# Patient Record
Sex: FEMALE | Race: WHITE | NOT HISPANIC OR LATINO | Employment: PART TIME | URBAN - NONMETROPOLITAN AREA
[De-identification: names, ages, dates, MRNs, and addresses within clinical notes are randomized per-mention and may not be internally consistent; named-entity substitution may affect disease eponyms.]

---

## 2017-02-07 ENCOUNTER — APPOINTMENT (OUTPATIENT)
Dept: SPEECH THERAPY | Facility: HOSPITAL | Age: 37
End: 2017-02-07

## 2017-02-08 ENCOUNTER — HOSPITAL ENCOUNTER (OUTPATIENT)
Dept: SPEECH THERAPY | Facility: HOSPITAL | Age: 37
Setting detail: THERAPIES SERIES
Discharge: HOME | End: 2017-02-08

## 2017-02-08 DIAGNOSIS — R49.9 VOICE DISTURBANCE: ICD-10-CM

## 2017-02-08 DIAGNOSIS — R49.0 HOARSENESS OF VOICE: Primary | ICD-10-CM

## 2017-02-08 PROCEDURE — 92524 BEHAVRAL QUALIT ANALYS VOICE: CPT

## 2017-02-08 NOTE — PROGRESS NOTES
Outpatient Speech Language Pathology   Adult/Peds Voice Initial Evaluation  [facility]     Patient Name: Yahir Garsia  : 1980  MRN: [id number]  Today's Date: 2017         Visit Date: 2017   Patient Active Problem List   Diagnosis   • Leiomyoma        Past Medical History   Diagnosis Date   • Anxiety    • Chronic laryngitis    • Endometriosis    • Fibroids      Uterine   • Hypertension         Past Surgical History   Procedure Laterality Date   •  section primary     •  section with tubal     • Endometrial fulguration     • Diagnostic laparoscopy     • Endoscopy     • Dilatation and curettage N/A 10/4/2016     Procedure: DILATATION AND CURETTAGE WITH FROZEN SECTION;  Surgeon: Rajendra Rico MD;  Location: [facility];  Service:    • Total laparoscopic hysterectomy N/A 10/4/2016     Procedure: TOTAL LAPAROSCOPIC HYSTERECTOMY BILATERAL SALPINGO OOPHERECTOMY WITH DAVINCI SI ROBOT;  Surgeon: Rajendra Rico MD;  Location: [facility];  Service:          Visit Dx:    ICD-10-CM ICD-9-CM   1. Hoarseness of voice R49.0 784.42   2. Voice disturbance R49.9 784.40                 Voice - 17 0900     General Voice History    Reflux History Patient takes reflux medications  -    Alcohol Servings Pt reports rarely consuming alcohol  -    Daily Water Intake 5-6 bottles per day  -    Daily Caffeine Intake none reported  -    Tobacco History smokes 1 pack per 3 days  -    Environmental Factors None reported  -    Typical Voice Use Uses voice for ADL's;Vocation depends on voice use (comment)  -    Vocal Abuse/Misuse Smoking;Unmanaged reflux  -    Voice Assessment    S/Z Ratio and Interpretation 1.4 which is suggest of vocal fold pathology  -    Maximum Phonation Time and Interpretation 10.9 seconds  -    Voice Features Observed Hoarseness;Breathiness;Unstable Loudness;Fast Fatigability  -    Trach History    Reason for Trach no trach hx  -    Intubation History pt  intubated during sx  -WH    PVFM History    Medical History Related to Referral GERD  -WH    Other Reported Symptoms Chronic coughing  -WH    Measures and Scales for Voice    Measures and Scales for Voice Voice Handicap Index;CAPE-V  -WH    Voice Handicap Index    Functional Subtest Score 31  -WH    Physical Subtest Score 29  -WH    Emotional Subtest Score 30  -WH    Total Score 59  -WH    CAPE-V    Overall Severity Moderately deviant  -WH    Roughness Moderately deviant  -WH    Breathiness Moderately deviant  -WH    Strain Moderately deviant  -WH    Pitch Moderately deviant  -WH    Loudness Moderately deviant  -WH    Instability Moderately deviant  -WH    Resonance Moderately deviant  -WH    Findings/Education/Recommendations    Clinical Impression- Voice Moderate voice disorder  -WH    Activity Limits and Participation Restrictions Employment  -    Impact on Function- Voice Difficulty communicating;Difficulty communicating in an emergency;Restrictions in personal and social life;Unable to complete specified job requirements;Difficulty participating in avocational activities  -    Education Described results of evaluation;Patient/Family expressed understanding or results;Patient/Family participated in establishing goals and plan of treatment;Patient/Family demonstrated recommended strategies;Patient/family requires further education on strategies, risks  -    Barriers to Learning No barriers identified  -    Learning Motivation Strong  -    Learning Assessment Learning needs;Learning motivation;Learning preference;Learning readiness  -    Prognosis Good (comment)  -    Recommendations Treatment for voice disorder;Home program established for patient to follow;Voice therapy  -WH    Frequency 1-2wk  -WH    Duration 12wks  -    Length of Session 60 minutes  -    Screening indicates further evaluation needed in the area(s) of: Voice  -WH      User Key  (r) = Recorded By, (t) = Taken By, (c) = Cosigned  By    Initials Name Provider Type    RONALD Tipton Speech Therapist                              OP SLP Education       02/08/17 1000          Education    Barriers to Learning No barriers identified  -      Education Provided Patient expressed understanding of evaluation;Patient participated in establishing goals and treatment plan;Patient demonstrated recommended strategies;Patient requires further education on strategies, risks  -      Assessed Learning needs;Learning motivation;Learning preferences;Learning readiness  -      Learning Motivation Strong  -      Learning Method Explanation;Demonstration;Written materials  -      Teaching Response Verbalized understanding;Demonstrated understanding  -      Education Comments Educated regarding results and POC  -        User Key  (r) = Recorded By, (t) = Taken By, (c) = Cosigned By    Initials Name Effective Dates     Ju Tipton 05/25/16 -                 SLP OP Goals       02/08/17 1000          Goal Type Needed    Goal Type Needed Voice  -      Voice Goals    Voice LTG's Patient will maintain a program of good vocal hygiene in all settings by developing an awareness of behaviors and lifestyle  -      Patient will maintain a program of good vocal hygiene in all settings by developing an awareness of behaviors and lifestyle. 90%:;without cues  -WH      Status: Patient will maintain a program of good vocal hygiene in all settings by developing an awareness of behaviors and lifestyle. New  -WH      Patient will be able to use voice in vocational and avocational activities without functional limitations due to hoarseness. 90%:;without cues  -WH      Status: Patient will be able to use voice in vocational and avocational activities without functional limitations due to hoarseness. New  -WH      Voice STG's Patient will eliminate vocally abusive behaviors such as coughing or throat clearing  -      Patient will eliminate vocally abusive  "behaviors such as coughing or throat clearing with cues  -WH      Status: Patient will eliminate vocally abusive behaviors such as coughing or throat clearing New  -      Patient will institute the vocal hygiene technique of taking short vocal \"naps\" during the day when voice feels tired 90%:;without cues  -WH      Status: Patient will institute the vocal hygiene technique of taking short vocal \"naps\" during the day when voice feels tired New  -      SLP Time Calculation    SLP Goal Re-Cert Due Date 03/08/17  -        User Key  (r) = Recorded By, (t) = Taken By, (c) = Cosigned By    Initials Name Provider Type     Ju Tipton Speech Therapist                OP SLP Assessment/Plan - 02/08/17 1000     SLP Assessment    Functional Problems --  -      User Key  (r) = Recorded By, (t) = Taken By, (c) = Cosigned By    Initials Name Provider Type     Ju Tipton Speech Therapist                 Time Calculation:   SLP Start Time: 0845  SLP Stop Time: 0930  SLP Time Calculation (min): 45 min  SLP Non-Billable Time (min): 45 min    Therapy Charges for Today     Code Description Service Date Service Provider Modifiers Qty    [id number]  ST CARE PLAN 15 MIN 2/8/2017 Ju IBARRA 3    [id number] [facility] BEHAV QUALT VOICE AND RESONC 3 2/8/2017 Ju IBARRA 1                     Ju Tipton  2/8/2017     "

## 2017-02-13 ENCOUNTER — HOSPITAL ENCOUNTER (OUTPATIENT)
Dept: SPEECH THERAPY | Facility: HOSPITAL | Age: 37
Setting detail: THERAPIES SERIES
Discharge: HOME | End: 2017-02-13

## 2017-02-13 DIAGNOSIS — R49.9 VOICE DISTURBANCE: Primary | ICD-10-CM

## 2017-02-13 PROCEDURE — 92507 TX SP LANG VOICE COMM INDIV: CPT

## 2017-02-13 NOTE — PROGRESS NOTES
Outpatient Speech Language Pathology   Adult/Peds Voice Treatment Note   [facility]     Patient Name: Yahir Garsia  : 1980  MRN: [id number]  Today's Date: 2017           Visit Date: 2017      Patient Active Problem List   Diagnosis   • Leiomyoma       Visit Dx:    ICD-10-CM ICD-9-CM   1. Voice disturbance R49.9 784.40             Voice - 17 0900     General Voice History    Reflux History Patient takes reflux medications  -    Alcohol Servings Pt reports rarely consuming alcohol  -    Daily Water Intake 5-6 bottles per day  -    Daily Caffeine Intake none reported  -    Tobacco History smokes 1 pack per 3 days  -    Environmental Factors None reported  -    Typical Voice Use Uses voice for ADL's;Vocation depends on voice use (comment)  -    Vocal Abuse/Misuse Smoking;Unmanaged reflux  -    Voice Assessment    S/Z Ratio and Interpretation 1.4 which is suggest of vocal fold pathology  -    Maximum Phonation Time and Interpretation 10.9 seconds  -    Voice Features Observed Hoarseness;Breathiness;Unstable Loudness;Fast Fatigability  -    Trach History    Reason for Trach no trach hx  -WH    Intubation History pt intubated during sx  -WH    PVFM History    Medical History Related to Referral GERD  -WH    Other Reported Symptoms Chronic coughing  -    Measures and Scales for Voice    Measures and Scales for Voice Voice Handicap Index;CAPE-V  -WH    Voice Handicap Index    Functional Subtest Score 31  -WH    Physical Subtest Score 29  -WH    Emotional Subtest Score 30  -WH    Total Score 59  -WH    CAPE-V    Overall Severity Moderately deviant  -WH    Roughness Moderately deviant  -WH    Breathiness Moderately deviant  -WH    Strain Moderately deviant  -WH    Pitch Moderately deviant  -WH    Loudness Moderately deviant  -WH    Instability Moderately deviant  -WH    Resonance Moderately deviant  -WH    Findings/Education/Recommendations    Clinical Impression- Voice  Moderate voice disorder  -WH    Activity Limits and Participation Restrictions Employment  -    Impact on Function- Voice Difficulty communicating;Difficulty communicating in an emergency;Restrictions in personal and social life;Unable to complete specified job requirements;Difficulty participating in avocational activities  -    Education Described results of evaluation;Patient/Family expressed understanding or results;Patient/Family participated in establishing goals and plan of treatment;Patient/Family demonstrated recommended strategies;Patient/family requires further education on strategies, risks  -    Barriers to Learning No barriers identified  -    Learning Motivation Strong  -    Learning Assessment Learning needs;Learning motivation;Learning preference;Learning readiness  -    Prognosis Good (comment)  -    Recommendations Treatment for voice disorder;Home program established for patient to follow;Voice therapy  -WH    Frequency 1-2wk  -WH    Duration 12wks  -    Length of Session 60 minutes  -    Screening indicates further evaluation needed in the area(s) of: Voice  -WH      User Key  (r) = Recorded By, (t) = Taken By, (c) = Cosigned By    Initials Name Provider Type    RONALD Tipton Speech Therapist                              OP SLP Assessment/Plan - 02/08/17 1000     SLP Assessment    Functional Problems --  -WH      User Key  (r) = Recorded By, (t) = Taken By, (c) = Cosigned By    Initials Name Provider Type    RONALD Tipton Speech Therapist                SLP OP Goals       02/13/17 1500 02/08/17 1000       Goal Type Needed    Goal Type Needed  Voice  -WH     Voice Goals    Voice LTG's  Patient will maintain a program of good vocal hygiene in all settings by developing an awareness of behaviors and lifestyle  -WH     Patient will maintain a program of good vocal hygiene in all settings by developing an awareness of behaviors and lifestyle. 90%:;without cues  -WH  "90%:;without cues  -WH     Status: Patient will maintain a program of good vocal hygiene in all settings by developing an awareness of behaviors and lifestyle. Progressing as expected  -WH New  -WH     Patient will be able to use voice in vocational and avocational activities without functional limitations due to hoarseness. 90%:;without cues  -WH 90%:;without cues  -WH     Status: Patient will be able to use voice in vocational and avocational activities without functional limitations due to hoarseness. Progressing as expected  -WH New  -WH     Voice STG's  Patient will eliminate vocally abusive behaviors such as coughing or throat clearing  -WH     Patient will eliminate vocally abusive behaviors such as coughing or throat clearing with cues  -WH with cues  -WH     Status: Patient will eliminate vocally abusive behaviors such as coughing or throat clearing Progressing as expected  -WH New  -WH     Patient will institute the vocal hygiene technique of taking short vocal \"naps\" during the day when voice feels tired 90%:;without cues  -WH 90%:;without cues  -WH     Status: Patient will institute the vocal hygiene technique of taking short vocal \"naps\" during the day when voice feels tired Progressing as expected  -WH New  -WH     SLP Time Calculation    SLP Goal Re-Cert Due Date  03/08/17  -       User Key  (r) = Recorded By, (t) = Taken By, (c) = Cosigned By    Initials Name Provider Type    RONALD Tipton Speech Therapist                OP SLP Education       02/13/17 1600          Education    Education Comments Educated pt regarding home program to complete  -        User Key  (r) = Recorded By, (t) = Taken By, (c) = Cosigned By    Initials Name Effective Dates    RONALD Tipton 05/25/16 -                 Time Calculation:   SLP Start Time: 1500  SLP Stop Time: 1600  SLP Time Calculation (min): 60 min  SLP Non-Billable Time (min): 30 min    Therapy Charges for Today     Code Description Service Date " Service Provider Modifiers Qty    [id number] HC ST CARE PLAN 15 MIN 2/13/2017 Ju IBARRA 2    [id number]  ST TREATMENT SPEECH 4 2/13/2017 Ju IBARRA 1                     Ju Tipton  2/13/2017

## 2017-02-14 ENCOUNTER — TRANSCRIBE ORDERS (OUTPATIENT)
Dept: PHYSICAL THERAPY | Facility: HOSPITAL | Age: 37
End: 2017-02-14

## 2017-02-14 DIAGNOSIS — R49.8 NEUROLOGIC VOICE DISORDER: Primary | ICD-10-CM

## 2017-02-20 ENCOUNTER — HOSPITAL ENCOUNTER (OUTPATIENT)
Dept: SPEECH THERAPY | Facility: HOSPITAL | Age: 37
Setting detail: THERAPIES SERIES
Discharge: HOME | End: 2017-02-20

## 2017-02-20 DIAGNOSIS — R49.9 VOICE DISTURBANCE: Primary | ICD-10-CM

## 2017-02-20 DIAGNOSIS — R49.0 HOARSENESS OF VOICE: ICD-10-CM

## 2017-02-20 PROCEDURE — 92507 TX SP LANG VOICE COMM INDIV: CPT

## 2017-02-20 NOTE — PROGRESS NOTES
Outpatient Speech Language Pathology   Adult/Peds Voice Treatment Note   [facility]     Patient Name: Yahir Garsia  : 1980  MRN: [id number]  Today's Date: 2017           Visit Date: 2017      Patient Active Problem List   Diagnosis   • Leiomyoma       Visit Dx:    ICD-10-CM ICD-9-CM   1. Voice disturbance R49.9 784.40   2. Hoarseness of voice R49.0 784.42                                   SLP OP Goals       17 1100 17 1500 17 1000    Goal Type Needed    Goal Type Needed   Voice  -WH    Voice Goals    Voice LTG's   Patient will maintain a program of good vocal hygiene in all settings by developing an awareness of behaviors and lifestyle  -WH    Patient will maintain a program of good vocal hygiene in all settings by developing an awareness of behaviors and lifestyle. 90%:;without cues  -WH 90%:;without cues  -WH 90%:;without cues  -WH    Status: Patient will maintain a program of good vocal hygiene in all settings by developing an awareness of behaviors and lifestyle. Progressing as expected  -WH Progressing as expected  -WH New  -WH    Comments: Patient will maintain a program of good vocal hygiene in all settings by developing an awareness of behaviors and lifestyle. Pt maintains hygiene program w/ 70% acc and min cues  -WH      Patient will be able to use voice in vocational and avocational activities without functional limitations due to hoarseness. 90%:;without cues  -WH 90%:;without cues  -WH 90%:;without cues  -WH    Status: Patient will be able to use voice in vocational and avocational activities without functional limitations due to hoarseness. Progressing as expected  -WH Progressing as expected  -WH New  -WH    Voice STG's   Patient will eliminate vocally abusive behaviors such as coughing or throat clearing  -WH    Patient will eliminate vocally abusive behaviors such as coughing or throat clearing with cues  -WH with cues  -WH with cues  -WH    Status: Patient will  "eliminate vocally abusive behaviors such as coughing or throat clearing Progressing as expected  -WH Progressing as expected  -WH New  -WH    Patient will institute the vocal hygiene technique of taking short vocal \"naps\" during the day when voice feels tired 90%:;without cues  -WH 90%:;without cues  -WH 90%:;without cues  -WH    Status: Patient will institute the vocal hygiene technique of taking short vocal \"naps\" during the day when voice feels tired Progressing as expected  -WH Progressing as expected  -WH New  -WH    SLP Time Calculation    SLP Goal Re-Cert Due Date   03/08/17  -      User Key  (r) = Recorded By, (t) = Taken By, (c) = Cosigned By    Initials Name Provider Type     Ju Tipton Speech Therapist                OP SLP Education       02/20/17 1300          Education    Education Comments Educated regarding progress and home program to utilize  -WH        User Key  (r) = Recorded By, (t) = Taken By, (c) = Cosigned By    Initials Name Effective Dates     Ju Tipton 05/25/16 -                 Time Calculation:   SLP Start Time: 1100  SLP Stop Time: 1200  SLP Time Calculation (min): 60 min  SLP Non-Billable Time (min): 30 min    Therapy Charges for Today     Code Description Service Date Service Provider Modifiers Qty    [id number]  ST CARE PLAN 15 MIN 2/20/2017 Ju IBARRA 2    [id number]  ST TREATMENT SPEECH 4 2/20/2017 Ju Tipton GN 1                     Ju Tipton  2/20/2017     "

## 2017-03-01 ENCOUNTER — HOSPITAL ENCOUNTER (OUTPATIENT)
Dept: SPEECH THERAPY | Facility: HOSPITAL | Age: 37
Setting detail: THERAPIES SERIES
Discharge: HOME | End: 2017-03-01

## 2017-03-01 DIAGNOSIS — R49.9 VOICE DISTURBANCE: Primary | ICD-10-CM

## 2017-03-01 DIAGNOSIS — R49.0 HOARSENESS OF VOICE: ICD-10-CM

## 2017-03-01 PROCEDURE — 92507 TX SP LANG VOICE COMM INDIV: CPT

## 2017-03-01 NOTE — PROGRESS NOTES
"Outpatient Speech Language Pathology   Adult/Peds Voice Treatment Note   [facility]     Patient Name: Yahir Garsia  : 1980  MRN: [id number]  Today's Date: 3/1/2017           Visit Date: 2017      Patient Active Problem List   Diagnosis   • Leiomyoma       Visit Dx:    ICD-10-CM ICD-9-CM   1. Voice disturbance R49.9 784.40   2. Hoarseness of voice R49.0 784.42                                   SLP OP Goals       17 1300       Voice Goals    Patient will maintain a program of good vocal hygiene in all settings by developing an awareness of behaviors and lifestyle. 90%:;without cues  -WH     Status: Patient will maintain a program of good vocal hygiene in all settings by developing an awareness of behaviors and lifestyle. Progressing as expected  -WH     Comments: Patient will maintain a program of good vocal hygiene in all settings by developing an awareness of behaviors and lifestyle. Pt maintains hygiene program w/ 70% acc and min cues  -WH     Patient will be able to use voice in vocational and avocational activities without functional limitations due to hoarseness. 90%:;without cues  -WH     Status: Patient will be able to use voice in vocational and avocational activities without functional limitations due to hoarseness. Progressing as expected  -WH     Patient will eliminate vocally abusive behaviors such as coughing or throat clearing with cues  -WH     Status: Patient will eliminate vocally abusive behaviors such as coughing or throat clearing Progressing as expected  -WH     Patient will institute the vocal hygiene technique of taking short vocal \"naps\" during the day when voice feels tired 90%:;without cues  -WH     Status: Patient will institute the vocal hygiene technique of taking short vocal \"naps\" during the day when voice feels tired Progressing as expected  -WH       User Key  (r) = Recorded By, (t) = Taken By, (c) = Cosigned By    Initials Name Provider Type    RONALD Tipton" Speech Therapist                OP SLP Education       03/01/17 1300    Education    Education Comments Educated pt regarding progress and continuing vocal rest and hydration program  -      User Key  (r) = Recorded By, (t) = Taken By, (c) = Cosigned By    Initials Name Effective Dates     Ju Tipton 05/25/16 -                 Time Calculation:   SLP Start Time: 1300  SLP Stop Time: 1330  SLP Time Calculation (min): 30 min  SLP Non-Billable Time (min): 30 min    Therapy Charges for Today     Code Description Service Date Service Provider Modifiers Qty    [id number]  ST CARE PLAN 15 MIN 3/1/2017 Ju Tipton  2    [id number] [facility] TREATMENT SPEECH 2 3/1/2017 Ju Tipton  1                     Ju Tipton  3/1/2017

## 2017-03-03 ENCOUNTER — HOSPITAL ENCOUNTER (OUTPATIENT)
Dept: GENERAL RADIOLOGY | Facility: HOSPITAL | Age: 37
Discharge: HOME | End: 2017-03-03
Attending: PSYCHIATRY & NEUROLOGY | Admitting: PSYCHIATRY & NEUROLOGY

## 2017-03-03 ENCOUNTER — TRANSCRIBE ORDERS (OUTPATIENT)
Dept: GENERAL RADIOLOGY | Facility: HOSPITAL | Age: 37
End: 2017-03-03

## 2017-03-03 DIAGNOSIS — M54.2 NECK PAIN: ICD-10-CM

## 2017-03-03 DIAGNOSIS — M54.2 NECK PAIN: Primary | ICD-10-CM

## 2017-03-03 PROCEDURE — 72050 X-RAY EXAM NECK SPINE 4/5VWS: CPT | Performed by: RADIOLOGY

## 2017-03-03 PROCEDURE — 72050 X-RAY EXAM NECK SPINE 4/5VWS: CPT

## 2017-03-06 ENCOUNTER — HOSPITAL ENCOUNTER (OUTPATIENT)
Dept: SPEECH THERAPY | Facility: HOSPITAL | Age: 37
Setting detail: THERAPIES SERIES
Discharge: HOME | End: 2017-03-06

## 2017-03-06 DIAGNOSIS — R49.9 VOICE DISTURBANCE: Primary | ICD-10-CM

## 2017-03-06 PROCEDURE — 92507 TX SP LANG VOICE COMM INDIV: CPT

## 2017-03-13 ENCOUNTER — HOSPITAL ENCOUNTER (OUTPATIENT)
Dept: SPEECH THERAPY | Facility: HOSPITAL | Age: 37
Setting detail: THERAPIES SERIES
Discharge: HOME | End: 2017-03-13

## 2017-03-13 DIAGNOSIS — R49.9 VOICE DISTURBANCE: Primary | ICD-10-CM

## 2017-03-13 PROCEDURE — 92507 TX SP LANG VOICE COMM INDIV: CPT

## 2017-03-13 NOTE — PROGRESS NOTES
"Outpatient Speech Language Pathology   Adult/Peds Voice Treatment Note   [place]     Patient Name: Yahir Garsia  : 1980  MRN: [id number]  Today's Date: 3/13/2017           Visit Date: 2017      Patient Active Problem List   Diagnosis   • Leiomyoma       Visit Dx:    ICD-10-CM ICD-9-CM   1. Voice disturbance R49.9 784.40                                   SLP OP Goals       17 1100       Voice Goals    Patient will maintain a program of good vocal hygiene in all settings by developing an awareness of behaviors and lifestyle. 90%:;without cues  -WH     Status: Patient will maintain a program of good vocal hygiene in all settings by developing an awareness of behaviors and lifestyle. Progressing as expected  -WH     Comments: Patient will maintain a program of good vocal hygiene in all settings by developing an awareness of behaviors and lifestyle. Pt maintains hygiene program w/ 70% acc and min cues  -WH     Patient will be able to use voice in vocational and avocational activities without functional limitations due to hoarseness. 90%:;without cues  -WH     Status: Patient will be able to use voice in vocational and avocational activities without functional limitations due to hoarseness. Progressing as expected  -WH     Comments: Patient will be able to use voice in vocational and avocational activities without functional limitations due to hoarseness. Pt w/ decreased ability to perform vocalization tasks today due to hoarsness  -WH     Patient will eliminate vocally abusive behaviors such as coughing or throat clearing with cues  -WH     Status: Patient will eliminate vocally abusive behaviors such as coughing or throat clearing Progressing as expected  -WH     Patient will institute the vocal hygiene technique of taking short vocal \"naps\" during the day when voice feels tired 90%:;without cues  -WH     Status: Patient will institute the vocal hygiene technique of taking short vocal \"naps\" " during the day when voice feels tired Progressing as expected  -       User Key  (r) = Recorded By, (t) = Taken By, (c) = Cosigned By    Initials Name Provider Type    RONALD Tipton Speech Therapist                OP SLP Education       03/13/17 1200    Education    Education Comments Educated pt regarding continuing vocal hygiene program and vocal rest  -      User Key  (r) = Recorded By, (t) = Taken By, (c) = Cosigned By    Initials Name Effective Dates    RONALD Tipton 05/25/16 -                 Time Calculation:   SLP Start Time: 1100  SLP Stop Time: 1200  SLP Time Calculation (min): 60 min  SLP Non-Billable Time (min): 30 min    Therapy Charges for Today     Code Description Service Date Service Provider Modifiers Qty    [id number]  ST CARE PLAN 15 MIN 3/13/2017 Ju IBARRA 2    [id number]  ST TREATMENT SPEECH 4 3/13/2017 Ju IBARRA 1                     uJ Tipton  3/13/2017

## 2017-03-20 ENCOUNTER — HOSPITAL ENCOUNTER (OUTPATIENT)
Dept: SPEECH THERAPY | Facility: HOSPITAL | Age: 37
Setting detail: THERAPIES SERIES
Discharge: HOME | End: 2017-03-20

## 2017-03-20 DIAGNOSIS — R49.9 VOICE DISTURBANCE: Primary | ICD-10-CM

## 2017-03-20 DIAGNOSIS — R49.0 HOARSENESS OF VOICE: ICD-10-CM

## 2017-03-20 PROCEDURE — 92507 TX SP LANG VOICE COMM INDIV: CPT

## 2017-03-20 NOTE — PROGRESS NOTES
"Outpatient Speech Language Pathology   Adult/Peds Voice Treatment Note   [place]     Patient Name: Yahir Garsia  : 1980  MRN: [id number]  Today's Date: 3/20/2017           Visit Date: 2017      Patient Active Problem List   Diagnosis   • Leiomyoma       Visit Dx:    ICD-10-CM ICD-9-CM   1. Voice disturbance R49.9 784.40   2. Hoarseness of voice R49.0 784.42                                   SLP OP Goals       17 1100       Voice Goals    Patient will maintain a program of good vocal hygiene in all settings by developing an awareness of behaviors and lifestyle. 90%:;without cues  -WH     Status: Patient will maintain a program of good vocal hygiene in all settings by developing an awareness of behaviors and lifestyle. Progressing as expected  -WH     Comments: Patient will maintain a program of good vocal hygiene in all settings by developing an awareness of behaviors and lifestyle. Pt maintains hygiene program w/ 80% acc and min cues  -WH     Patient will be able to use voice in vocational and avocational activities without functional limitations due to hoarseness. 90%:;without cues  -WH     Status: Patient will be able to use voice in vocational and avocational activities without functional limitations due to hoarseness. Progressing as expected  -WH     Comments: Patient will be able to use voice in vocational and avocational activities without functional limitations due to hoarseness. Pt w/ decreased ability to perform vocalization tasks today due to hoarsness  -WH     Patient will eliminate vocally abusive behaviors such as coughing or throat clearing with cues  -WH     Status: Patient will eliminate vocally abusive behaviors such as coughing or throat clearing Progressing as expected  -WH     Patient will institute the vocal hygiene technique of taking short vocal \"naps\" during the day when voice feels tired 90%:;without cues  -WH     Status: Patient will institute the vocal hygiene " "technique of taking short vocal \"naps\" during the day when voice feels tired Progressing as expected  -       User Key  (r) = Recorded By, (t) = Taken By, (c) = Cosigned By    Initials Name Provider Type    RONALD Tipton Speech Therapist                OP SLP Education       03/20/17 1300    Education    Education Comments Educated pt regarding continuing vocal hygiene and rest  -      User Key  (r) = Recorded By, (t) = Taken By, (c) = Cosigned By    Initials Name Effective Dates    RONALD Tipton 05/25/16 -                 Time Calculation:   SLP Start Time: 1100  SLP Stop Time: 1200  SLP Time Calculation (min): 60 min  SLP Non-Billable Time (min): 30 min    Therapy Charges for Today     Code Description Service Date Service Provider Modifiers Qty    [id number]  ST CARE PLAN 15 MIN 3/20/2017 Ju IBARRA 2    [id number] [facility] TREATMENT SPEECH 4 3/20/2017 Ju IBARRA 1                     Ju Tipton  3/20/2017     "

## 2017-04-10 ENCOUNTER — TRANSCRIBE ORDERS (OUTPATIENT)
Dept: ADMINISTRATIVE | Facility: HOSPITAL | Age: 37
End: 2017-04-10

## 2017-04-10 DIAGNOSIS — J37.0 CHRONIC LARYNGITIS: Primary | ICD-10-CM

## 2017-04-11 ENCOUNTER — HOSPITAL ENCOUNTER (OUTPATIENT)
Dept: MRI IMAGING | Facility: HOSPITAL | Age: 37
Discharge: HOME | End: 2017-04-11
Attending: PSYCHIATRY & NEUROLOGY

## 2017-04-11 DIAGNOSIS — J37.0 CHRONIC LARYNGITIS: ICD-10-CM

## 2017-04-25 ENCOUNTER — OFFICE VISIT (OUTPATIENT)
Dept: SURGERY | Facility: CLINIC | Age: 37
End: 2017-04-25

## 2017-04-25 VITALS
RESPIRATION RATE: 17 BRPM | DIASTOLIC BLOOD PRESSURE: 97 MMHG | BODY MASS INDEX: 44.93 KG/M2 | SYSTOLIC BLOOD PRESSURE: 161 MMHG | HEART RATE: 111 BPM | WEIGHT: 238 LBS | TEMPERATURE: 98.4 F | HEIGHT: 61 IN

## 2017-04-25 DIAGNOSIS — K21.9 GASTROESOPHAGEAL REFLUX DISEASE, ESOPHAGITIS PRESENCE NOT SPECIFIED: Primary | ICD-10-CM

## 2017-04-25 DIAGNOSIS — R49.0 HOARSENESS, CHRONIC: ICD-10-CM

## 2017-04-25 PROBLEM — Z71.9 ENCOUNTER FOR CONSULTATION: Status: ACTIVE | Noted: 2017-04-25

## 2017-04-25 PROCEDURE — 99204 OFFICE O/P NEW MOD 45 MIN: CPT | Performed by: SURGERY

## 2017-04-25 RX ORDER — RANITIDINE 300 MG/1
CAPSULE ORAL
COMMUNITY
Start: 2017-04-17 | End: 2018-01-25

## 2017-04-25 RX ORDER — ESTRADIOL 2 MG/1
TABLET ORAL
COMMUNITY
Start: 2017-04-17 | End: 2018-01-25

## 2017-04-25 NOTE — PROGRESS NOTES
2017    Patient Information  Yahir Garsia  [address] [address]  1980  [phone] (home)     Chief Complaint   Patient presents with   • Consult     Referred for Laryngopharyngeal Reflux         HPI  Patient is a 36-year-old white female referred by Dr. Lora for laryngeal pharyngeal reflux.  This patient is having severe hoarseness which is been going on for 3 years.  It got much worse the last 7 months.  She is seen 3 different ENT doctors.  Dr. Lora performed laryngoscopy and saw evidence of laryngeal pharyngeal reflux.  Patient has some tightness in her chest but denies heartburn.  She is morbidly obese        Review of Systems:    General: weight gain 20lbs  Integumentary: negative  Eyes: glasses  ENT: earache, recurrent sore throat and hoarseness  Respiratory: negative  Gastrointestinal: diarrhea  Cardiovascular: negative  Neurological: headaches  Psychiatric: anxiety  Hematologic/Lymphatic: easy bruising  Genitourinary: negative  Musculoskeletal: negative  Endocrine: hot flashes  Breasts: negative      Patient Active Problem List   Diagnosis   • Leiomyoma   • Encounter for consultation         Past Medical History:   Diagnosis Date   • Anxiety    • Chronic laryngitis    • Endometriosis    • Fibroids     Uterine   • Hypertension    • Leiomyoma    • Migraine    • Sleep apnea          Past Surgical History:   Procedure Laterality Date   •  SECTION PRIMARY     •  SECTION WITH TUBAL     • DIAGNOSTIC LAPAROSCOPY     • DILATATION AND CURETTAGE N/A 10/4/2016    Procedure: DILATATION AND CURETTAGE WITH FROZEN SECTION;  Surgeon: Rajendra Rico MD;  Location: [facility];  Service:    • ENDOMETRIAL FULGURATION     • ENDOSCOPY     • TOTAL LAPAROSCOPIC HYSTERECTOMY N/A 10/4/2016    Procedure: TOTAL LAPAROSCOPIC HYSTERECTOMY BILATERAL SALPINGO OOPHERECTOMY WITH DAVINCI SI ROBOT;  Surgeon: Rajendra Rico MD;  Location: [facility];  Service:          Family History    Problem Relation Age of Onset   • Family history unknown: Yes         Social History   Substance Use Topics   • Smoking status: Current Some Day Smoker     Packs/day: 0.50     Years: 10.00     Types: Cigarettes   • Smokeless tobacco: Not on file   • Alcohol use Yes      Comment: occasional       Current Outpatient Prescriptions   Medication Sig Dispense Refill   • estradiol (ESTRACE) 2 MG tablet      • lisinopril (PRINIVIL,ZESTRIL) 20 MG tablet Take 20 mg by mouth Daily.     • ranitidine (ZANTAC) 300 MG capsule        No current facility-administered medications for this visit.          Allergies  Review of patient's allergies indicates no known allergies.      Physical Exam   Constitutional: She is oriented to person, place, and time. She appears well-developed and well-nourished. No distress.   Morbidly obese   HENT:   Head: Normocephalic.   Right Ear: External ear normal.   Left Ear: External ear normal.   Nose: Nose normal.   Mouth/Throat: Oropharynx is clear and moist.   Eyes: Conjunctivae and EOM are normal. Right eye exhibits no discharge. Left eye exhibits no discharge.   Neck: Normal range of motion. No JVD present. No tracheal deviation present. No thyromegaly present.   Cardiovascular: Normal rate, regular rhythm, normal heart sounds and intact distal pulses.  Exam reveals no gallop and no friction rub.    No murmur heard.  Pulmonary/Chest: Effort normal and breath sounds normal. No stridor. No respiratory distress. She has no wheezes. She has no rales. She exhibits no tenderness.   Abdominal: Soft. Bowel sounds are normal. She exhibits no distension and no mass. There is no tenderness. There is no rebound and no guarding. No hernia.   Genitourinary: Rectal exam shows guaiac negative stool.   Musculoskeletal: Normal range of motion. She exhibits no edema, tenderness or deformity.   Lymphadenopathy:     She has no cervical adenopathy.   Neurological: She is alert and oriented to person, place, and time.  "She has normal reflexes. She displays normal reflexes. No cranial nerve deficit. She exhibits normal muscle tone. Coordination normal.   Skin: Skin is warm and dry. No rash noted. She is not diaphoretic. No erythema. No pallor.   Psychiatric: She has a normal mood and affect. Her behavior is normal. Judgment and thought content normal.   Nursing note and vitals reviewed.        /97  Pulse 111  Temp 98.4 °F (36.9 °C)  Resp 17  Ht 61\" (154.9 cm)  Wt 238 lb (108 kg)  BMI 44.97 kg/m2      ASSESSMENT  Laryngeal pharyngeal reflux and probable gastroesophageal reflux.        PLAN    EGD and pH study.        Morris Solares MD    "

## 2017-05-05 RX ORDER — IBUPROFEN 800 MG/1
800 TABLET ORAL DAILY PRN
COMMUNITY
Start: 2017-03-26 | End: 2018-07-26

## 2017-05-08 ENCOUNTER — ANESTHESIA EVENT (OUTPATIENT)
Dept: PERIOP | Facility: HOSPITAL | Age: 37
End: 2017-05-08

## 2017-05-08 ENCOUNTER — HOSPITAL ENCOUNTER (OUTPATIENT)
Facility: HOSPITAL | Age: 37
Setting detail: HOSPITAL OUTPATIENT SURGERY
Discharge: HOME | End: 2017-05-08
Attending: SURGERY | Admitting: SURGERY

## 2017-05-08 ENCOUNTER — ANESTHESIA (OUTPATIENT)
Dept: PERIOP | Facility: HOSPITAL | Age: 37
End: 2017-05-08

## 2017-05-08 VITALS
BODY MASS INDEX: 44.93 KG/M2 | OXYGEN SATURATION: 98 % | SYSTOLIC BLOOD PRESSURE: 144 MMHG | DIASTOLIC BLOOD PRESSURE: 96 MMHG | WEIGHT: 238 LBS | TEMPERATURE: 98.7 F | RESPIRATION RATE: 20 BRPM | HEART RATE: 76 BPM | HEIGHT: 61 IN

## 2017-05-08 DIAGNOSIS — R49.0 HOARSENESS, CHRONIC: ICD-10-CM

## 2017-05-08 PROCEDURE — 91035 G-ESOPH REFLX TST W/ELECTROD: CPT | Performed by: SURGERY

## 2017-05-08 PROCEDURE — 25010000002 MIDAZOLAM PER 1 MG: Performed by: NURSE ANESTHETIST, CERTIFIED REGISTERED

## 2017-05-08 PROCEDURE — 43239 EGD BIOPSY SINGLE/MULTIPLE: CPT | Performed by: SURGERY

## 2017-05-08 PROCEDURE — 25010000002 PROPOFOL 1000 MG/ML EMULSION: Performed by: NURSE ANESTHETIST, CERTIFIED REGISTERED

## 2017-05-08 PROCEDURE — 25010000002 FENTANYL CITRATE (PF) 100 MCG/2ML SOLUTION: Performed by: NURSE ANESTHETIST, CERTIFIED REGISTERED

## 2017-05-08 PROCEDURE — 25010000002 PROPOFOL 10 MG/ML EMULSION: Performed by: NURSE ANESTHETIST, CERTIFIED REGISTERED

## 2017-05-08 RX ORDER — OXYCODONE HYDROCHLORIDE AND ACETAMINOPHEN 5; 325 MG/1; MG/1
1 TABLET ORAL ONCE AS NEEDED
Status: DISCONTINUED | OUTPATIENT
Start: 2017-05-08 | End: 2017-05-08 | Stop reason: HOSPADM

## 2017-05-08 RX ORDER — MEPERIDINE HYDROCHLORIDE 25 MG/ML
12.5 INJECTION INTRAMUSCULAR; INTRAVENOUS; SUBCUTANEOUS
Status: DISCONTINUED | OUTPATIENT
Start: 2017-05-08 | End: 2017-05-08 | Stop reason: HOSPADM

## 2017-05-08 RX ORDER — MIDAZOLAM HYDROCHLORIDE 1 MG/ML
INJECTION INTRAMUSCULAR; INTRAVENOUS AS NEEDED
Status: DISCONTINUED | OUTPATIENT
Start: 2017-05-08 | End: 2017-05-08 | Stop reason: SURG

## 2017-05-08 RX ORDER — ONDANSETRON 2 MG/ML
4 INJECTION INTRAMUSCULAR; INTRAVENOUS ONCE AS NEEDED
Status: DISCONTINUED | OUTPATIENT
Start: 2017-05-08 | End: 2017-05-08 | Stop reason: HOSPADM

## 2017-05-08 RX ORDER — PROPOFOL 10 MG/ML
VIAL (ML) INTRAVENOUS AS NEEDED
Status: DISCONTINUED | OUTPATIENT
Start: 2017-05-08 | End: 2017-05-08 | Stop reason: SURG

## 2017-05-08 RX ORDER — FENTANYL CITRATE 50 UG/ML
50 INJECTION, SOLUTION INTRAMUSCULAR; INTRAVENOUS
Status: DISCONTINUED | OUTPATIENT
Start: 2017-05-08 | End: 2017-05-08 | Stop reason: HOSPADM

## 2017-05-08 RX ORDER — SODIUM CHLORIDE 0.9 % (FLUSH) 0.9 %
1-10 SYRINGE (ML) INJECTION AS NEEDED
Status: DISCONTINUED | OUTPATIENT
Start: 2017-05-08 | End: 2017-05-08 | Stop reason: HOSPADM

## 2017-05-08 RX ORDER — LIDOCAINE HYDROCHLORIDE 20 MG/ML
INJECTION, SOLUTION INFILTRATION; PERINEURAL AS NEEDED
Status: DISCONTINUED | OUTPATIENT
Start: 2017-05-08 | End: 2017-05-08 | Stop reason: SURG

## 2017-05-08 RX ORDER — FENTANYL CITRATE 50 UG/ML
INJECTION, SOLUTION INTRAMUSCULAR; INTRAVENOUS AS NEEDED
Status: DISCONTINUED | OUTPATIENT
Start: 2017-05-08 | End: 2017-05-08 | Stop reason: SURG

## 2017-05-08 RX ORDER — SODIUM CHLORIDE, SODIUM LACTATE, POTASSIUM CHLORIDE, CALCIUM CHLORIDE 600; 310; 30; 20 MG/100ML; MG/100ML; MG/100ML; MG/100ML
125 INJECTION, SOLUTION INTRAVENOUS CONTINUOUS
Status: DISCONTINUED | OUTPATIENT
Start: 2017-05-08 | End: 2017-05-08 | Stop reason: HOSPADM

## 2017-05-08 RX ORDER — IPRATROPIUM BROMIDE AND ALBUTEROL SULFATE 2.5; .5 MG/3ML; MG/3ML
3 SOLUTION RESPIRATORY (INHALATION) ONCE AS NEEDED
Status: DISCONTINUED | OUTPATIENT
Start: 2017-05-08 | End: 2017-05-08 | Stop reason: HOSPADM

## 2017-05-08 RX ADMIN — MIDAZOLAM HYDROCHLORIDE 2 MG: 1 INJECTION, SOLUTION INTRAMUSCULAR; INTRAVENOUS at 08:26

## 2017-05-08 RX ADMIN — LIDOCAINE HYDROCHLORIDE 40 MG: 20 INJECTION, SOLUTION INFILTRATION; PERINEURAL at 08:26

## 2017-05-08 RX ADMIN — PROPOFOL 150 MCG/KG/MIN: 10 INJECTION, EMULSION INTRAVENOUS at 08:26

## 2017-05-08 RX ADMIN — FENTANYL CITRATE 100 MCG: 50 INJECTION INTRAMUSCULAR; INTRAVENOUS at 08:26

## 2017-05-08 RX ADMIN — PROPOFOL 30 MG: 10 INJECTION, EMULSION INTRAVENOUS at 08:26

## 2017-05-08 RX ADMIN — SODIUM CHLORIDE, POTASSIUM CHLORIDE, SODIUM LACTATE AND CALCIUM CHLORIDE: 600; 310; 30; 20 INJECTION, SOLUTION INTRAVENOUS at 08:25

## 2017-05-09 LAB
LAB AP CASE REPORT: NORMAL
Lab: NORMAL
PATH REPORT.FINAL DX SPEC: NORMAL

## 2017-05-19 ENCOUNTER — OFFICE VISIT (OUTPATIENT)
Dept: SURGERY | Facility: CLINIC | Age: 37
End: 2017-05-19

## 2017-05-19 VITALS
SYSTOLIC BLOOD PRESSURE: 160 MMHG | BODY MASS INDEX: 44.93 KG/M2 | TEMPERATURE: 98.4 F | HEART RATE: 100 BPM | WEIGHT: 238 LBS | HEIGHT: 61 IN | RESPIRATION RATE: 17 BRPM | DIASTOLIC BLOOD PRESSURE: 97 MMHG

## 2017-05-19 DIAGNOSIS — R49.0 HOARSENESS, CHRONIC: Primary | ICD-10-CM

## 2017-05-19 DIAGNOSIS — K29.00 ACUTE SUPERFICIAL GASTRITIS WITHOUT HEMORRHAGE: ICD-10-CM

## 2017-05-19 DIAGNOSIS — K21.9 GASTROESOPHAGEAL REFLUX DISEASE, ESOPHAGITIS PRESENCE NOT SPECIFIED: ICD-10-CM

## 2017-05-19 DIAGNOSIS — E66.09 OBESITY DUE TO EXCESS CALORIES, UNSPECIFIED OBESITY SEVERITY: ICD-10-CM

## 2017-05-19 DIAGNOSIS — K44.9 HIATAL HERNIA: ICD-10-CM

## 2017-05-19 PROBLEM — Z09 FOLLOW UP: Status: ACTIVE | Noted: 2017-05-19

## 2017-05-19 PROCEDURE — 99214 OFFICE O/P EST MOD 30 MIN: CPT | Performed by: SURGERY

## 2017-05-25 ENCOUNTER — TELEPHONE (OUTPATIENT)
Dept: SURGERY | Facility: CLINIC | Age: 37
End: 2017-05-25

## 2017-06-08 ENCOUNTER — DOCUMENTATION (OUTPATIENT)
Dept: BARIATRICS/WEIGHT MGMT | Facility: CLINIC | Age: 37
End: 2017-06-08

## 2017-06-08 DIAGNOSIS — R10.13 DYSPEPSIA: ICD-10-CM

## 2017-06-08 DIAGNOSIS — R06.00 DYSPNEA, UNSPECIFIED TYPE: Primary | ICD-10-CM

## 2017-06-08 DIAGNOSIS — R53.83 FATIGUE, UNSPECIFIED TYPE: ICD-10-CM

## 2017-06-08 RX ORDER — BUSPIRONE HYDROCHLORIDE 10 MG/1
10 TABLET ORAL 2 TIMES DAILY
COMMUNITY
End: 2017-08-29

## 2017-06-27 ENCOUNTER — OFFICE VISIT (OUTPATIENT)
Dept: SURGERY | Facility: CLINIC | Age: 37
End: 2017-06-27

## 2017-06-27 VITALS
HEART RATE: 98 BPM | BODY MASS INDEX: 44.93 KG/M2 | SYSTOLIC BLOOD PRESSURE: 158 MMHG | DIASTOLIC BLOOD PRESSURE: 96 MMHG | WEIGHT: 238 LBS | TEMPERATURE: 98.2 F | RESPIRATION RATE: 17 BRPM | HEIGHT: 61 IN

## 2017-06-27 DIAGNOSIS — K29.00 ACUTE SUPERFICIAL GASTRITIS WITHOUT HEMORRHAGE: ICD-10-CM

## 2017-06-27 DIAGNOSIS — K44.9 HIATAL HERNIA: ICD-10-CM

## 2017-06-27 DIAGNOSIS — K21.9 GASTROESOPHAGEAL REFLUX DISEASE, ESOPHAGITIS PRESENCE NOT SPECIFIED: ICD-10-CM

## 2017-06-27 DIAGNOSIS — R49.0 HOARSENESS, CHRONIC: Primary | ICD-10-CM

## 2017-06-27 DIAGNOSIS — E66.09 OBESITY DUE TO EXCESS CALORIES, UNSPECIFIED OBESITY SEVERITY: ICD-10-CM

## 2017-06-27 PROCEDURE — 99214 OFFICE O/P EST MOD 30 MIN: CPT | Performed by: SURGERY

## 2017-06-27 RX ORDER — LISINOPRIL AND HYDROCHLOROTHIAZIDE 20; 12.5 MG/1; MG/1
1 TABLET ORAL DAILY
Status: ON HOLD | COMMUNITY
Start: 2017-06-20 | End: 2018-12-14

## 2017-06-27 RX ORDER — SIMVASTATIN 20 MG
TABLET ORAL
COMMUNITY
Start: 2017-06-22 | End: 2018-01-25

## 2017-06-27 NOTE — PROGRESS NOTES
2017    Patient Information  Yahir Garsia  [address]  [place] [address]  1980  [phone] (home)     Chief Complaint   Patient presents with   • Follow-up     FU LPR         HPI  36-year-old white female who has severe reflux symptomatology and hoarseness.  [place]ter scores in the 20s.  Last time I saw her I arranged for her to be seen by a bariatric surgeon.  She said his office called and it was 6 months before she was able see him.  Her hoarseness is so symptomatic she cannot get a job.  And has asked me to go ahead and proceed with Nissen fundoplication.   Review of Systems:    General: weight gain 20lbs  Integumentary: negative  Eyes: glasses  ENT: earache, recurrent sore throat and hoarseness  Respiratory: negative  Gastrointestinal: diarrhea  Cardiovascular: negative  Neurological: headaches  Psychiatric: anxiety  Hematologic/Lymphatic: easy bruising  Genitourinary: negative  Musculoskeletal: negative  Endocrine: hot flashes  Breasts: negative    Patient Active Problem List   Diagnosis   • Leiomyoma   • Encounter for consultation   • Follow up         Past Medical History:   Diagnosis Date   • Anxiety    • Chronic laryngitis    • Endometriosis    • Fibroids     Uterine   • Hypertension    • Leiomyoma    • Migraine    • Sleep apnea          Past Surgical History:   Procedure Laterality Date   • BRAVO PROCEDURE N/A 2017    Procedure: ESOPHAGOGASTRODUODENOSCOPY AND NAILS;  Surgeon: Morirs Solares MD;  Location: [facility];  Service:    •  SECTION PRIMARY     •  SECTION WITH TUBAL     • DIAGNOSTIC LAPAROSCOPY     • DILATATION AND CURETTAGE N/A 10/4/2016    Procedure: DILATATION AND CURETTAGE WITH FROZEN SECTION;  Surgeon: Rajendra Rico MD;  Location: [facility];  Service:    • ENDOMETRIAL FULGURATION     • ENDOSCOPY     • TOTAL LAPAROSCOPIC HYSTERECTOMY N/A 10/4/2016    Procedure: TOTAL LAPAROSCOPIC HYSTERECTOMY BILATERAL SALPINGO OOPHERECTOMY WITH DAVINCI SI ROBOT;   Surgeon: Rajendra Rico MD;  Location: [facility];  Service:    • TUBAL ABDOMINAL LIGATION  2006         Family History   Problem Relation Age of Onset   • No Known Problems Mother    • No Known Problems Father    • Obesity Sister    • Hypertension Sister          Social History   Substance Use Topics   • Smoking status: Current Some Day Smoker     Packs/day: 0.50     Years: 10.00     Types: Cigarettes   • Smokeless tobacco: Never Used   • Alcohol use Yes      Comment: occasional       Current Outpatient Prescriptions   Medication Sig Dispense Refill   • busPIRone (BUSPAR) 10 MG tablet Take 10 mg by mouth 2 (Two) Times a Day.     • estradiol (ESTRACE) 2 MG tablet      • ibuprofen (ADVIL,MOTRIN) 800 MG tablet 800 mg As Needed.     • lisinopril (PRINIVIL,ZESTRIL) 20 MG tablet Take 20 mg by mouth Daily.     • ranitidine (ZANTAC) 300 MG capsule        No current facility-administered medications for this visit.          Allergies  Review of patient's allergies indicates no known allergies.      Physical Exam   Constitutional: She is oriented to person, place, and time. She appears well-developed and well-nourished. No distress.   HENT:   Head: Normocephalic.   Right Ear: External ear normal.   Left Ear: External ear normal.   Nose: Nose normal.   Mouth/Throat: Oropharynx is clear and moist.   Eyes: Conjunctivae and EOM are normal. Right eye exhibits no discharge. Left eye exhibits no discharge.   Neck: Normal range of motion. No JVD present. No tracheal deviation present. No thyromegaly present.   Cardiovascular: Normal rate, regular rhythm, normal heart sounds and intact distal pulses.  Exam reveals no gallop and no friction rub.    No murmur heard.  Pulmonary/Chest: Effort normal and breath sounds normal. No stridor. No respiratory distress. She has no wheezes. She has no rales. She exhibits no tenderness.   Abdominal: Soft. Bowel sounds are normal. She exhibits no distension and no mass. There is no tenderness.  "There is no rebound and no guarding. No hernia.   Genitourinary: Rectal exam shows guaiac negative stool.   Musculoskeletal: Normal range of motion. She exhibits no edema, tenderness or deformity.   Lymphadenopathy:     She has no cervical adenopathy.   Neurological: She is alert and oriented to person, place, and time. She has normal reflexes. She displays normal reflexes. No cranial nerve deficit. She exhibits normal muscle tone. Coordination normal.   Skin: Skin is warm and dry. No rash noted. She is not diaphoretic. No erythema. No pallor.   Psychiatric: She has a normal mood and affect. Her behavior is normal. Judgment and thought content normal.     No change     /96  Pulse 98  Temp 98.2 °F (36.8 °C)  Resp 17  Ht 61\" (154.9 cm)  Wt 238 lb (108 kg)  LMP 09/30/2016 (Exact Date)  BMI 44.97 kg/m2      ASSSevere reflux disease        PLAEMS, barium swallow, loose 38 pounds and then we will do surgery        Morris Solares MD    "

## 2017-06-28 ENCOUNTER — TELEPHONE (OUTPATIENT)
Dept: SURGERY | Facility: CLINIC | Age: 37
End: 2017-06-28

## 2017-06-30 ENCOUNTER — HOSPITAL ENCOUNTER (OUTPATIENT)
Dept: PREOP | Facility: HOSPITAL | Age: 37
Setting detail: HOSPITAL OUTPATIENT SURGERY
Discharge: HOME | End: 2017-06-30
Attending: SURGERY | Admitting: SURGERY

## 2017-06-30 VITALS
SYSTOLIC BLOOD PRESSURE: 146 MMHG | TEMPERATURE: 98.1 F | RESPIRATION RATE: 20 BRPM | OXYGEN SATURATION: 98 % | DIASTOLIC BLOOD PRESSURE: 93 MMHG | WEIGHT: 233 LBS | BODY MASS INDEX: 43.99 KG/M2 | HEART RATE: 98 BPM | HEIGHT: 61 IN

## 2017-06-30 DIAGNOSIS — K44.9 HIATAL HERNIA: ICD-10-CM

## 2017-06-30 DIAGNOSIS — K21.9 GASTROESOPHAGEAL REFLUX DISEASE, ESOPHAGITIS PRESENCE NOT SPECIFIED: ICD-10-CM

## 2017-06-30 DIAGNOSIS — E66.09 OBESITY DUE TO EXCESS CALORIES, UNSPECIFIED OBESITY SEVERITY: ICD-10-CM

## 2017-06-30 DIAGNOSIS — R49.0 HOARSENESS, CHRONIC: ICD-10-CM

## 2017-06-30 DIAGNOSIS — K29.00 ACUTE SUPERFICIAL GASTRITIS WITHOUT HEMORRHAGE: ICD-10-CM

## 2017-06-30 PROCEDURE — 91010 ESOPHAGUS MOTILITY STUDY: CPT

## 2017-07-10 ENCOUNTER — HOSPITAL ENCOUNTER (OUTPATIENT)
Dept: GENERAL RADIOLOGY | Facility: HOSPITAL | Age: 37
Discharge: HOME | End: 2017-07-10
Attending: SURGERY | Admitting: SURGERY

## 2017-07-10 PROCEDURE — 74220 X-RAY XM ESOPHAGUS 1CNTRST: CPT

## 2017-07-10 PROCEDURE — 74220 X-RAY XM ESOPHAGUS 1CNTRST: CPT | Performed by: RADIOLOGY

## 2017-07-18 ENCOUNTER — OFFICE VISIT (OUTPATIENT)
Dept: SURGERY | Facility: CLINIC | Age: 37
End: 2017-07-18

## 2017-07-18 VITALS
HEART RATE: 94 BPM | SYSTOLIC BLOOD PRESSURE: 147 MMHG | TEMPERATURE: 98.3 F | RESPIRATION RATE: 18 BRPM | HEIGHT: 61 IN | BODY MASS INDEX: 44.18 KG/M2 | DIASTOLIC BLOOD PRESSURE: 110 MMHG | WEIGHT: 234 LBS

## 2017-07-18 DIAGNOSIS — K21.9 GASTROESOPHAGEAL REFLUX DISEASE, ESOPHAGITIS PRESENCE NOT SPECIFIED: ICD-10-CM

## 2017-07-18 DIAGNOSIS — R49.0 HOARSENESS, CHRONIC: Primary | ICD-10-CM

## 2017-07-18 DIAGNOSIS — K29.00 ACUTE SUPERFICIAL GASTRITIS WITHOUT HEMORRHAGE: ICD-10-CM

## 2017-07-18 DIAGNOSIS — K44.9 HIATAL HERNIA: ICD-10-CM

## 2017-07-18 DIAGNOSIS — E66.09 OBESITY DUE TO EXCESS CALORIES, UNSPECIFIED OBESITY SEVERITY: ICD-10-CM

## 2017-07-18 PROCEDURE — 99213 OFFICE O/P EST LOW 20 MIN: CPT | Performed by: SURGERY

## 2017-07-18 NOTE — PROGRESS NOTES
2017    Patient Information  Yahir Garsia  [address] [facility] [address] [facility] [place] [address]  1980  [phone] (home)     Chief Complaint   Patient presents with   • Follow-up     FU EMS/Barium Swallow         HPI  Patient is a 36-year-old white male with reflux and severe hoarseness.  She had a barium swallow which confirmed reflux.  She was not able to be a mass.      ROS reviewed and confirmed.  Review of Systems:    General: weight gain 20lbs  Integumentary: negative  Eyes: glasses  ENT: earache, recurrent sore throat and hoarseness  Respiratory: negative  Gastrointestinal: diarrhea  Cardiovascular: negative  Neurological: headaches  Psychiatric: anxiety  Hematologic/Lymphatic: easy bruising  Genitourinary: negative  Musculoskeletal: negative  Endocrine: hot flashes  Breasts: negative      Patient Active Problem List   Diagnosis   • Leiomyoma   • Encounter for consultation   • Follow up         Past Medical History:   Diagnosis Date   • Anxiety    • Chronic laryngitis    • Endometriosis    • Fibroids     Uterine   • Hypertension    • Leiomyoma    • Migraine    • Sleep apnea          Past Surgical History:   Procedure Laterality Date   • BRAVO PROCEDURE N/A 2017    Procedure: ESOPHAGOGASTRODUODENOSCOPY AND NAILS;  Surgeon: Morris Solares MD;  Location: [facility];  Service:    •  SECTION PRIMARY     •  SECTION WITH TUBAL     • DIAGNOSTIC LAPAROSCOPY     • DILATATION AND CURETTAGE N/A 10/4/2016    Procedure: DILATATION AND CURETTAGE WITH FROZEN SECTION;  Surgeon: Rajendra Rico MD;  Location: [facility];  Service:    • ENDOMETRIAL FULGURATION     • ENDOSCOPY     • TOTAL LAPAROSCOPIC HYSTERECTOMY N/A 10/4/2016    Procedure: TOTAL LAPAROSCOPIC HYSTERECTOMY BILATERAL SALPINGO OOPHERECTOMY WITH DAVINCI SI ROBOT;  Surgeon: Rajendra Rico MD;  Location: [facility];  Service:    • TUBAL ABDOMINAL LIGATION           Family History   Problem Relation Age of Onset   • No Known Problems Mother    •  "No Known Problems Father    • Obesity Sister    • Hypertension Sister          Social History   Substance Use Topics   • Smoking status: Current Some Day Smoker     Packs/day: 0.50     Years: 10.00     Types: Cigarettes   • Smokeless tobacco: Never Used   • Alcohol use Yes      Comment: occasional       Current Outpatient Prescriptions   Medication Sig Dispense Refill   • busPIRone (BUSPAR) 10 MG tablet Take 10 mg by mouth 2 (Two) Times a Day.     • estradiol (ESTRACE) 2 MG tablet      • ibuprofen (ADVIL,MOTRIN) 800 MG tablet 800 mg As Needed.     • lisinopril (PRINIVIL,ZESTRIL) 20 MG tablet Take 20 mg by mouth Daily.     • lisinopril-hydrochlorothiazide (PRINZIDE,ZESTORETIC) 20-12.5 MG per tablet      • metFORMIN (GLUCOPHAGE) 500 MG tablet      • ranitidine (ZANTAC) 300 MG capsule      • simvastatin (ZOCOR) 20 MG tablet        No current facility-administered medications for this visit.          Allergies  Review of patient's allergies indicates no known allergies.      Physical Exam  Change    BP (!) 147/110  Pulse 94  Temp 98.3 °F (36.8 °C)  Resp 18  Ht 61\" (154.9 cm)  Wt 234 lb (106 kg)  LMP 09/30/2016 (Exact Date)  BMI 44.21 kg/m2      ASSESSMENT  Hoarseness secondary to her severe reflux with her obesity.        PLAN    I have instructed her that when she gets down to 200 pounds we can schedule her for surgery.  She weighs 238 this time.  She is going to return with her weight is down to 200.        Morris Solares MD    "

## 2017-08-28 DIAGNOSIS — R10.13 DYSPEPSIA: ICD-10-CM

## 2017-08-28 DIAGNOSIS — R06.00 DYSPNEA, UNSPECIFIED TYPE: ICD-10-CM

## 2017-08-28 DIAGNOSIS — R53.83 FATIGUE, UNSPECIFIED TYPE: ICD-10-CM

## 2017-08-29 ENCOUNTER — DOCUMENTATION (OUTPATIENT)
Dept: BARIATRICS/WEIGHT MGMT | Facility: HOSPITAL | Age: 37
End: 2017-08-29

## 2017-08-29 ENCOUNTER — OFFICE VISIT (OUTPATIENT)
Dept: BARIATRICS/WEIGHT MGMT | Facility: CLINIC | Age: 37
End: 2017-08-29

## 2017-08-29 VITALS
HEART RATE: 85 BPM | SYSTOLIC BLOOD PRESSURE: 118 MMHG | TEMPERATURE: 97 F | WEIGHT: 221.5 LBS | DIASTOLIC BLOOD PRESSURE: 80 MMHG | OXYGEN SATURATION: 99 % | RESPIRATION RATE: 22 BRPM | BODY MASS INDEX: 41.82 KG/M2 | HEIGHT: 61 IN

## 2017-08-29 DIAGNOSIS — E66.01 OBESITY, CLASS III, BMI 40-49.9 (MORBID OBESITY) (HCC): Primary | ICD-10-CM

## 2017-08-29 DIAGNOSIS — G47.33 OBSTRUCTIVE SLEEP APNEA SYNDROME: ICD-10-CM

## 2017-08-29 DIAGNOSIS — R53.83 FATIGUE, UNSPECIFIED TYPE: ICD-10-CM

## 2017-08-29 DIAGNOSIS — K21.9 GASTROESOPHAGEAL REFLUX DISEASE, ESOPHAGITIS PRESENCE NOT SPECIFIED: ICD-10-CM

## 2017-08-29 DIAGNOSIS — E78.5 HYPERLIPIDEMIA, UNSPECIFIED HYPERLIPIDEMIA TYPE: ICD-10-CM

## 2017-08-29 DIAGNOSIS — I10 ESSENTIAL HYPERTENSION: ICD-10-CM

## 2017-08-29 PROCEDURE — 99215 OFFICE O/P EST HI 40 MIN: CPT | Performed by: SURGERY

## 2017-08-29 PROCEDURE — 99406 BEHAV CHNG SMOKING 3-10 MIN: CPT | Performed by: SURGERY

## 2017-08-29 RX ORDER — OMEPRAZOLE 40 MG/1
40 CAPSULE, DELAYED RELEASE ORAL 2 TIMES DAILY
Qty: 60 CAPSULE | Refills: 11 | Status: SHIPPED | OUTPATIENT
Start: 2017-08-29 | End: 2018-01-25

## 2017-08-29 NOTE — PROGRESS NOTES
[facility] BARIATRIC SURGERY  [address]  Formerly [facility] [address]  [phone]      Patient  Name:  Yahir Garsia.  :  1980      Date of Visit: 2017      Chief Complaint:  weight gain; unable to maintain weight loss    History of Present Illness:  Yahir Garsia is a 37 y.o. female who presents today for evaluation, education and consultation regarding weight loss surgery. The patient is interested in sleeve gastrectomy     Yahir has been overweight for at least 10 years, has been 35 pounds or more overweight for at least 10 years, has been 100 pounds or more overweight for unsure or more years and started dieting at age unsure.  Yahir describes her eating habits as snacker. She has cut back on snacking the past month and has done less snacking.      Previous diet attempts include: Slim Fast; None; None.  The most weight Yahir lost was 20 pounds on diet and exercise but was only able to maintain that weight loss for unsure.  Her maximum lifetime weight is 250 pounds.    Immediately I noticed a hoarse voice.  She states that this is due to severe reflux.  She was recommended to have a Nissen and was sent to Dr. Solares in [place].  He thought BMI was too high for Nissen, and told her to either have bariatric surgery or lose weight and consider Nissen when BMI <35.  She lost her voice 1 year ago for the whole year.  Previous to that, she had intermittent voice loss for 4 years.  Previous UGI in [place] showed severe reflux.  Bravo testing was also done, + for reflux per patient.  EGD by Dr. Solares showed small hiatal hernia per patient.  Did not have manometry due to intolerance.  Is on Zantac 300 mg daily.  She had a prescription for nexium from her PCP but insurance wouldn't cover.  She has tried other acid pills in the past that did not work.  The zantac does not help.  Also has some financial barrier to getting Prilosec OTC.  This problem has been so bad that she  has been unable to work.      She is a little conflicted about whether she should lose down to 200 pounds and go for the Nissen or if she wants a sleeve, likely HHR.      Past Medical History:   Diagnosis Date   • Anxiety    • Chronic laryngitis    • Fatigue    • GERD (gastroesophageal reflux disease)     full history in [facility].  SEVERE, on H2 blocker only due ot insurance not covering PPI.  Loses employment opportunities due to hoarseness from reflux.    • Hyperlipidemia    • Hypertension    • Migraine    • Prediabetes    • Sleep apnea     does not use CPAP due to intolerance     Past Surgical History:   Procedure Laterality Date   • BRAVO PROCEDURE N/A 2017    Procedure: ESOPHAGOGASTRODUODENOSCOPY AND NAILS;  Surgeon: Morris Solares MD;  Location: [facility];  Service:    •  SECTION PRIMARY      done under general anesthesia b/c couldn't get epidural   •  SECTION WITH TUBAL     • DIAGNOSTIC LAPAROSCOPY      looking for endometriosis   • DILATATION AND CURETTAGE N/A 10/4/2016    Procedure: DILATATION AND CURETTAGE WITH FROZEN SECTION;  Surgeon: Rajendra Rico MD;  Location: [facility] OR;  for abnormal uterine bleeding   • ENDOMETRIAL FULGURATION     • ENDOSCOPY  2017    in Dr. Beck Gongora   • TOTAL LAPAROSCOPIC HYSTERECTOMY N/A 10/4/2016    Procedure: TOTAL LAPAROSCOPIC HYSTERECTOMY BILATERAL SALPINGO OOPHERECTOMY WITH DAVINCI SI ROBOT;  Surgeon: Rajendra Rico MD;  Location: [facility] OR;  endometriosis/fibroids   • TUBAL ABDOMINAL LIGATION         No Known Allergies      Current Outpatient Prescriptions:   •  estradiol (ESTRACE) 2 MG tablet, , Disp: , Rfl:   •  ibuprofen (ADVIL,MOTRIN) 800 MG tablet, 800 mg As Needed., Disp: , Rfl:   •  lisinopril-hydrochlorothiazide (PRINZIDE,ZESTORETIC) 20-12.5 MG per tablet, , Disp: , Rfl:   •  ranitidine (ZANTAC) 300 MG capsule, , Disp: , Rfl:   •  simvastatin (ZOCOR) 20 MG tablet, , Disp: , Rfl:     Social History     Social History   • Marital status:       Spouse name: Heath Garsia   • Number of children: 2   • Years of education: high School [employer]      Occupational History   • Unemployed       Social History Main Topics   • Smoking status: Current Some Day Smoker     Packs/day: 0.50     Years: 10.00     Types: Cigarettes   • Smokeless tobacco: Never Used      Comment: I not around secondhand smoke in house or car unless visits certain family members   • Alcohol use Yes      Comment: occasional   • Drug use: No   • Sexual activity: Defer      Comment:       Other Topics Concern   • Not on file     Social History Narrative    Lives with  and children.  Unemployed due to not being able to find work due to hoarse voice.  Denied disability due to hoarseness not that severe.       Family History   Problem Relation Age of Onset   • No Known Problems Mother    • No Known Problems Father    • Obesity Sister    • Hypertension Sister          Review of Systems:  Constitutional:  The patient reports fatigue, weight gain and denies fevers and chills.  Cardiovascular:  The patient reports HTN, HLD, edema and denies CP, MI, heart disease and DVT.  Respiratory:  The patient reports apnea and denies asthma and PE.  Gastrointestinal:  The patient reports heartburn and denies pancreatitis, liver disease and IBS.  Genitourinary:  The patient denies renal insufficiency.    Musculoskeletal:  The patient reports none and denies joint pain, fibromyalgia, arthritis and autoimmune disease.  Neurological:  The patient reports none and denies seizure and stroke.  Psychiatric:  The patient reports anxiety and denies depression and bipolar disorder.  Endocrine:  The patient reports none and denies diabetes, thyroid disease and gout.  Hematologic:  The patient reports easy bruising (attributes to restless legs, moving around at night) and denies anemia and bleeding disorder.  Skin:  The patient denies MRSA.    Physical Exam:  Vital Signs:  Weight: 221 lb 8 oz (100 kg)    Body mass index is 41.85 kg/(m^2).  Temp: 97 °F (36.1 °C)   Heart Rate: 85   BP: 118/80     Physical Exam   Constitutional: She is oriented to person, place, and time. She appears well-developed and well-nourished.   HENT:   Head: Normocephalic and atraumatic.   Mouth/Throat: No oropharyngeal exudate.   Red throat   Eyes: EOM are normal. Pupils are equal, round, and reactive to light. No scleral icterus.   Neck: Normal range of motion. Neck supple. No tracheal deviation present. No thyromegaly present.   Cardiovascular: Normal rate, regular rhythm and normal heart sounds.    Pulmonary/Chest: Effort normal and breath sounds normal. No respiratory distress. She has no wheezes.   Abdominal: Soft. She exhibits no distension and no mass. There is no tenderness. No hernia.       Musculoskeletal: Normal range of motion. She exhibits no edema or deformity.   Neurological: She is alert and oriented to person, place, and time. No cranial nerve deficit.   Skin: Skin is warm. No rash noted. She is not diaphoretic. No erythema.   Psychiatric: She has a normal mood and affect. Her behavior is normal. Judgment and thought content normal.          Patient Active Problem List   Diagnosis   • Leiomyoma   • Encounter for consultation   • Follow up       Assessment:    Yahir Garsia is a 37 y.o. year old female with medically complicated obesity pursuing sleeve gastrectomy.    Weight loss surgery is deemed medically necessary given the following obesity related comorbidities including sleep apnea, hypertension, dyslipidemia, GERD, edema and tobacco use with current Weight: 221 lb 8 oz (100 kg) and Body mass index is 41.85 kg/(m^2)..    Plan:  The consultation plan and program requirements were reviewed with the patient.  The patient has been advised that a letter of medical support must be obtained from her primary care physician or referring provider. A psychological evaluation will be arranged.  A nutritional evaluation will be  performed.  The patient was advised to start a high protein and low carbohydrate diet.  Necessary lifestyle modifications were discussed.  Instructions on how to access ALISON was given to the patient.  ALISON is an internet based educational video that explains the surgical procedure chosen and answers basic questions regarding that procedure.     Preoperative testing will include: CBC, CMP, Fasting Lipids, TSH, H.Pylori, Pulmonary Function Testing, Urine Anabasine, CXR and EKG.  EGD and UGI already done, showed small HH and reflux.  I'm going to attempt to put her on a PPI; we will see if her insurance will cover this time.      Additional preop clearances required prior to surgery: Cardiac. --will refer to Rolan.    Patient understands that bariatric surgery is not cosmetic surgery but rather a tool to help make a lifelong commitment to lifestyle changes including diet, exercise, behavior modifications, and healthy habits.    I also discussed the options for surgery with her.  Her main complaint is reflux.  Her surgical options are Nissen (after 20 more pounds of weight loss per Dr. Solares), RYGB (with understanding that this surgery is not routinely performed at this practice unless there is a contraindication to sleeve), or sleeve gastrectomy with hiatal hernia repair.  She is carefully considering her options, but for now is planning to pursue sleeve.      I spent 60 minutes with the patient and 45 minutes was spent counseling.      I spent >3 minutes discussion smoking cessation and/or avoidance of second-hand smoke.        Belgica Huston MD

## 2017-08-30 NOTE — PROGRESS NOTES
Weight Loss Surgery  Presurgical Nutrition Assessment     Yahir Garsia  2017  [id number]  [id number]  1980  female    Surgery desired: Sleeve Gastrectomy  Weight: 221 lb 8 oz (100 kg)   Body mass index is 41.85 kg/(m^2).    Past Medical History:   Diagnosis Date   • Anxiety    • Chronic laryngitis    • Fatigue    • GERD (gastroesophageal reflux disease)     full history in hospitals.  SEVERE, on H2 blocker only due ot insurance not covering PPI.  Loses employment opportunities due to hoarseness from reflux.    • Hyperlipidemia    • Hypertension    • Migraine    • Prediabetes    • Sleep apnea     does not use CPAP due to intolerance     Past Surgical History:   Procedure Laterality Date   • BRAVO PROCEDURE N/A 2017    Procedure: ESOPHAGOGASTRODUODENOSCOPY AND NAILS;  Surgeon: Morris Solares MD;  Location: [facility];  Service:    •  SECTION PRIMARY      done under general anesthesia b/c couldn't get epidural   •  SECTION WITH TUBAL     • DIAGNOSTIC LAPAROSCOPY      looking for endometriosis   • DILATATION AND CURETTAGE N/A 10/4/2016    Procedure: DILATATION AND CURETTAGE WITH FROZEN SECTION;  Surgeon: Rajendra Rico MD;  Location: [facility] OR;  for abnormal uterine bleeding   • ENDOMETRIAL FULGURATION     • ENDOSCOPY  2017    in Dr. Beck Gongora   • TOTAL LAPAROSCOPIC HYSTERECTOMY N/A 10/4/2016    Procedure: TOTAL LAPAROSCOPIC HYSTERECTOMY BILATERAL SALPINGO OOPHERECTOMY WITH DAVINCI SI ROBOT;  Surgeon: Rajendra Rico MD;  Location: [facility];  endometriosis/fibroids   • TUBAL ABDOMINAL LIGATION       No Known Allergies    Current Outpatient Prescriptions:   •  estradiol (ESTRACE) 2 MG tablet, , Disp: , Rfl:   •  ibuprofen (ADVIL,MOTRIN) 800 MG tablet, 800 mg As Needed., Disp: , Rfl:   •  lisinopril-hydrochlorothiazide (PRINZIDE,ZESTORETIC) 20-12.5 MG per tablet, , Disp: , Rfl:   •  omeprazole (PRILOSEC) 40 MG capsule, Take 1 capsule by mouth 2 (Two) Times a Day., Disp: 60  capsule, Rfl: 11  •  ranitidine (ZANTAC) 300 MG capsule, , Disp: , Rfl:   •  simvastatin (ZOCOR) 20 MG tablet, , Disp: , Rfl:       Nutrition Assessment    Estimated energy needs: 1800    Estimated calories for weight loss: 1400    IBW (Pounds):  140      Excess body weight (Pounds): 81       Nutrition Recall  24 Hour recall: (B) (L) (D) -  Reviewed and discussed with patient       Exercise  three times a week      Education    Provided handouts including dietary guidelines for Sleeve Gastrectomy    Recommend that team proceed with surgery and follow per protocol.      Nutrition Goals   Dietary Guidelines per manual  Protein goal:  grams per day     Exercise Goals  Continue current exercise routine   Add 15-30 minutes of activity per day as tolerated        Fanny Vasquez RD  08/29/2017  3:55 PM

## 2017-08-31 ENCOUNTER — OFFICE VISIT (OUTPATIENT)
Dept: CARDIOLOGY | Facility: CLINIC | Age: 37
End: 2017-08-31

## 2017-08-31 VITALS
BODY MASS INDEX: 42.14 KG/M2 | SYSTOLIC BLOOD PRESSURE: 108 MMHG | DIASTOLIC BLOOD PRESSURE: 78 MMHG | OXYGEN SATURATION: 97 % | HEART RATE: 79 BPM | HEIGHT: 61 IN | WEIGHT: 223.2 LBS

## 2017-08-31 DIAGNOSIS — Z72.0 TOBACCO USE: ICD-10-CM

## 2017-08-31 DIAGNOSIS — R06.02 SOB (SHORTNESS OF BREATH): ICD-10-CM

## 2017-08-31 DIAGNOSIS — E66.01 OBESITY, CLASS III, BMI 40-49.9 (MORBID OBESITY) (HCC): ICD-10-CM

## 2017-08-31 DIAGNOSIS — E78.2 MIXED HYPERLIPIDEMIA: ICD-10-CM

## 2017-08-31 DIAGNOSIS — Z01.818 PREOPERATIVE CLEARANCE: Primary | ICD-10-CM

## 2017-08-31 DIAGNOSIS — I10 ESSENTIAL HYPERTENSION: ICD-10-CM

## 2017-08-31 DIAGNOSIS — R94.31 ABNORMAL EKG: ICD-10-CM

## 2017-08-31 PROBLEM — R49.0 HOARSENESS: Status: ACTIVE | Noted: 2017-08-31

## 2017-08-31 LAB
ALBUMIN SERPL-MCNC: 4.5 G/DL (ref 3.2–4.8)
ALBUMIN/GLOB SERPL: 1.7 G/DL (ref 1.5–2.5)
ALP SERPL-CCNC: 100 U/L (ref 25–100)
ALT SERPL-CCNC: 24 U/L (ref 7–40)
AST SERPL-CCNC: 19 U/L (ref 0–33)
BILIRUB SERPL-MCNC: 0.4 MG/DL (ref 0.3–1.2)
BUN SERPL-MCNC: 11 MG/DL (ref 9–23)
BUN/CREAT SERPL: 13.8 (ref 7–25)
CALCIUM SERPL-MCNC: 9.6 MG/DL (ref 8.7–10.4)
CHLORIDE SERPL-SCNC: 108 MMOL/L (ref 99–109)
CHOLEST SERPL-MCNC: 198 MG/DL (ref 0–200)
CO2 SERPL-SCNC: 27 MMOL/L (ref 20–31)
CREAT SERPL-MCNC: 0.8 MG/DL (ref 0.6–1.3)
ERYTHROCYTE [DISTWIDTH] IN BLOOD BY AUTOMATED COUNT: 14.9 % (ref 11.3–14.5)
GLOBULIN SER CALC-MCNC: 2.6 GM/DL
GLUCOSE SERPL-MCNC: 89 MG/DL (ref 70–100)
H PYLORI IGA SER-ACNC: <9 UNITS (ref 0–8.9)
H PYLORI IGG SER IA-ACNC: <0.9 U/ML (ref 0–0.8)
H PYLORI IGM SER-ACNC: <9 UNITS (ref 0–8.9)
HCT VFR BLD AUTO: 43.3 % (ref 34.5–44)
HDLC SERPL-MCNC: 45 MG/DL (ref 40–60)
HGB BLD-MCNC: 13.8 G/DL (ref 11.5–15.5)
LDLC SERPL CALC-MCNC: 126 MG/DL (ref 0–100)
MCH RBC QN AUTO: 30.2 PG (ref 27–31)
MCHC RBC AUTO-ENTMCNC: 31.9 G/DL (ref 32–36)
MCV RBC AUTO: 94.7 FL (ref 80–99)
PLATELET # BLD AUTO: 268 10*3/MM3 (ref 150–450)
POTASSIUM SERPL-SCNC: 4.1 MMOL/L (ref 3.5–5.5)
PROT SERPL-MCNC: 7.1 G/DL (ref 5.7–8.2)
RBC # BLD AUTO: 4.57 10*6/MM3 (ref 3.89–5.14)
SODIUM SERPL-SCNC: 142 MMOL/L (ref 132–146)
TRIGL SERPL-MCNC: 135 MG/DL (ref 0–150)
TSH SERPL DL<=0.005 MIU/L-ACNC: 2.15 MIU/ML (ref 0.35–5.35)
VLDLC SERPL CALC-MCNC: 27 MG/DL
WBC # BLD AUTO: 7.39 10*3/MM3 (ref 3.5–10.8)

## 2017-08-31 PROCEDURE — 99204 OFFICE O/P NEW MOD 45 MIN: CPT | Performed by: INTERNAL MEDICINE

## 2017-08-31 PROCEDURE — 93000 ELECTROCARDIOGRAM COMPLETE: CPT | Performed by: INTERNAL MEDICINE

## 2017-09-08 ENCOUNTER — HOSPITAL ENCOUNTER (OUTPATIENT)
Dept: CARDIOLOGY | Facility: HOSPITAL | Age: 37
Discharge: HOME | End: 2017-09-08
Attending: INTERNAL MEDICINE | Admitting: INTERNAL MEDICINE

## 2017-09-08 ENCOUNTER — HOSPITAL ENCOUNTER (OUTPATIENT)
Dept: CARDIOLOGY | Facility: HOSPITAL | Age: 37
Discharge: HOME | End: 2017-09-08
Attending: INTERNAL MEDICINE

## 2017-09-08 DIAGNOSIS — R94.31 ABNORMAL EKG: ICD-10-CM

## 2017-09-08 DIAGNOSIS — R06.02 SOB (SHORTNESS OF BREATH): ICD-10-CM

## 2017-09-08 DIAGNOSIS — Z01.818 PREOPERATIVE CLEARANCE: ICD-10-CM

## 2017-09-08 LAB
BH CV ECHO MEAS - % IVS THICK: 56.9 %
BH CV ECHO MEAS - % LVPW THICK: 66.5 %
BH CV ECHO MEAS - ACS: 1.8 CM
BH CV ECHO MEAS - AO ROOT AREA (BSA CORRECTED): 1.4
BH CV ECHO MEAS - AO ROOT AREA: 5.9 CM^2
BH CV ECHO MEAS - AO ROOT DIAM: 2.7 CM
BH CV ECHO MEAS - BSA(HAYCOCK): 2.1 M^2
BH CV ECHO MEAS - BSA: 2 M^2
BH CV ECHO MEAS - BZI_BMI: 42.1 KILOGRAMS/M^2
BH CV ECHO MEAS - BZI_METRIC_HEIGHT: 154.9 CM
BH CV ECHO MEAS - BZI_METRIC_WEIGHT: 101.2 KG
BH CV ECHO MEAS - CONTRAST EF 4CH: 63.3 ML/M^2
BH CV ECHO MEAS - EDV(CUBED): 82.7 ML
BH CV ECHO MEAS - EDV(MOD-SP4): 49 ML
BH CV ECHO MEAS - EDV(TEICH): 85.7 ML
BH CV ECHO MEAS - EF(CUBED): 69.9 %
BH CV ECHO MEAS - EF(MOD-SP4): 63.3 %
BH CV ECHO MEAS - EF(TEICH): 61.8 %
BH CV ECHO MEAS - ESV(CUBED): 24.9 ML
BH CV ECHO MEAS - ESV(MOD-SP4): 18 ML
BH CV ECHO MEAS - ESV(TEICH): 32.8 ML
BH CV ECHO MEAS - FS: 33 %
BH CV ECHO MEAS - IVS/LVPW: 1.1
BH CV ECHO MEAS - IVSD: 0.98 CM
BH CV ECHO MEAS - IVSS: 1.5 CM
BH CV ECHO MEAS - LA DIMENSION: 3.2 CM
BH CV ECHO MEAS - LA/AO: 1.1
BH CV ECHO MEAS - LV DIASTOLIC VOL/BSA (35-75): 24.8 ML/M^2
BH CV ECHO MEAS - LV MASS(C)D: 136.2 GRAMS
BH CV ECHO MEAS - LV MASS(C)DI: 68.9 GRAMS/M^2
BH CV ECHO MEAS - LV MASS(C)S: 159.6 GRAMS
BH CV ECHO MEAS - LV MASS(C)SI: 80.6 GRAMS/M^2
BH CV ECHO MEAS - LV SYSTOLIC VOL/BSA (12-30): 9.1 ML/M^2
BH CV ECHO MEAS - LVIDD: 4.4 CM
BH CV ECHO MEAS - LVIDS: 2.9 CM
BH CV ECHO MEAS - LVLD AP4: 6.8 CM
BH CV ECHO MEAS - LVLS AP4: 6.4 CM
BH CV ECHO MEAS - LVOT AREA (M): 2.3 CM^2
BH CV ECHO MEAS - LVOT AREA: 2.3 CM^2
BH CV ECHO MEAS - LVOT DIAM: 1.7 CM
BH CV ECHO MEAS - LVPWD: 0.93 CM
BH CV ECHO MEAS - LVPWS: 1.5 CM
BH CV ECHO MEAS - MV A MAX VEL: 71.6 CM/SEC
BH CV ECHO MEAS - MV E MAX VEL: 93.3 CM/SEC
BH CV ECHO MEAS - MV E/A: 1.3
BH CV ECHO MEAS - RVDD: 1.9 CM
BH CV ECHO MEAS - SI(CUBED): 29.2 ML/M^2
BH CV ECHO MEAS - SI(MOD-SP4): 15.7 ML/M^2
BH CV ECHO MEAS - SI(TEICH): 26.8 ML/M^2
BH CV ECHO MEAS - SV(CUBED): 57.8 ML
BH CV ECHO MEAS - SV(MOD-SP4): 31 ML
BH CV ECHO MEAS - SV(TEICH): 52.9 ML
BH CV STRESS BP STAGE 1: NORMAL
BH CV STRESS DURATION MIN STAGE 1: 3
BH CV STRESS DURATION SEC STAGE 1: 0
BH CV STRESS GRADE STAGE 1: 10
BH CV STRESS HR STAGE 1: 164
BH CV STRESS METS STAGE 1: 5
BH CV STRESS PROTOCOL 1: NORMAL
BH CV STRESS RECOVERY BP: NORMAL MMHG
BH CV STRESS RECOVERY HR: 77 BPM
BH CV STRESS SPEED STAGE 1: 1.7
BH CV STRESS STAGE 1: 1
MAXIMAL PREDICTED HEART RATE: 183 BPM
PERCENT MAX PREDICTED HR: 65.03 %
STRESS BASELINE BP: NORMAL MMHG
STRESS BASELINE HR: 105 BPM
STRESS PERCENT HR: 77 %
STRESS POST ESTIMATED WORKLOAD: 4.6 METS
STRESS POST EXERCISE DUR MIN: 3 MIN
STRESS POST EXERCISE DUR SEC: 0 SEC
STRESS POST PEAK BP: NORMAL MMHG
STRESS POST PEAK HR: 119 BPM
STRESS TARGET HR: 156 BPM

## 2017-09-08 PROCEDURE — 93306 TTE W/DOPPLER COMPLETE: CPT | Performed by: INTERNAL MEDICINE

## 2017-09-08 PROCEDURE — 93018 CV STRESS TEST I&R ONLY: CPT | Performed by: INTERNAL MEDICINE

## 2017-09-08 PROCEDURE — 93017 CV STRESS TEST TRACING ONLY: CPT

## 2017-09-08 PROCEDURE — 93306 TTE W/DOPPLER COMPLETE: CPT

## 2017-09-13 ENCOUNTER — OFFICE VISIT (OUTPATIENT)
Dept: CARDIOLOGY | Facility: CLINIC | Age: 37
End: 2017-09-13

## 2017-09-13 VITALS
HEIGHT: 61 IN | WEIGHT: 221.4 LBS | SYSTOLIC BLOOD PRESSURE: 138 MMHG | DIASTOLIC BLOOD PRESSURE: 80 MMHG | BODY MASS INDEX: 41.8 KG/M2 | HEART RATE: 86 BPM | OXYGEN SATURATION: 98 %

## 2017-09-13 DIAGNOSIS — E78.2 MIXED HYPERLIPIDEMIA: ICD-10-CM

## 2017-09-13 DIAGNOSIS — E66.01 OBESITY, CLASS III, BMI 40-49.9 (MORBID OBESITY) (HCC): ICD-10-CM

## 2017-09-13 DIAGNOSIS — I10 ESSENTIAL HYPERTENSION: ICD-10-CM

## 2017-09-13 DIAGNOSIS — Z01.818 PREOPERATIVE CLEARANCE: Primary | ICD-10-CM

## 2017-09-13 PROCEDURE — 99213 OFFICE O/P EST LOW 20 MIN: CPT | Performed by: INTERNAL MEDICINE

## 2017-09-13 NOTE — PROGRESS NOTES
subjective     Chief Complaint   Patient presents with   • Follow-up     TEST RESULT   • Hypertension   • Hyperlipidemia     History of Present Illness  Patient is 37 years old white female who was seen by me for preop clearance patient is planning to have bariatric surgery.  Patient underwent echocardiogram and stress test.  Stress test was negative for significant exercise-induced myocardial ischemia and echocardiogram did not show any significant valvular heart disease.  LV ejection fraction was normal.    Patient is here for follow-up and to discuss the procedure reports.  She is doing very well and denies any cardiac symptoms.    Patient has hypertension which is very well controlled  She also has hyperlipidemia which is controlled with medications.      Past Surgical History:   Procedure Laterality Date   • BRAVO PROCEDURE N/A 2017    Procedure: ESOPHAGOGASTRODUODENOSCOPY AND NAILS;  Surgeon: Morris Solares MD;  Location: [facility];  Service:    • CARDIOVASCULAR STRESS TEST  2017   •  SECTION PRIMARY      done under general anesthesia b/c couldn't get epidural   •  SECTION WITH TUBAL     • DIAGNOSTIC LAPAROSCOPY      looking for endometriosis   • DILATATION AND CURETTAGE N/A 10/4/2016    Procedure: DILATATION AND CURETTAGE WITH FROZEN SECTION;  Surgeon: Rajendra Rico MD;  Location: [facility] OR;  for abnormal uterine bleeding   • ECHO - CONVERTED  2017   • ENDOMETRIAL FULGURATION     • ENDOSCOPY  2017    in Dr. Beck Gongora   • TOTAL LAPAROSCOPIC HYSTERECTOMY N/A 10/4/2016    Procedure: TOTAL LAPAROSCOPIC HYSTERECTOMY BILATERAL SALPINGO OOPHERECTOMY WITH DAVINCI SI ROBOT;  Surgeon: Rajendra Rico MD;  Location: [facility] OR;  endometriosis/fibroids   • TUBAL ABDOMINAL LIGATION       Family History   Problem Relation Age of Onset   • No Known Problems Mother    • No Known Problems Father    • Obesity Sister    • Hypertension Sister      Past Medical History:   Diagnosis  Date   • Anxiety    • Chronic laryngitis    • Fatigue    • GERD (gastroesophageal reflux disease)     full history in [place].  SEVERE, on H2 blocker only due ot insurance not covering PPI.  Loses employment opportunities due to hoarseness from reflux.    • Hyperlipidemia    • Hypertension    • Migraine    • Prediabetes    • Sleep apnea     does not use CPAP due to intolerance     Patient Active Problem List   Diagnosis   • Leiomyoma   • Encounter for consultation   • Tobacco use   • Hoarseness   • Preoperative clearance   • Obesity, Class III, BMI 40-49.9 (morbid obesity)   • Essential hypertension   • Mixed hyperlipidemia   • Abnormal EKG   • SOB (shortness of breath)       Social History   Substance Use Topics   • Smoking status: Current Some Day Smoker     Packs/day: 0.50     Years: 10.00     Types: Cigarettes   • Smokeless tobacco: Never Used      Comment: I not around secondhand smoke in house or car unless visits certain family members   • Alcohol use Yes      Comment: occasional       No Known Allergies    Current Outpatient Prescriptions on File Prior to Visit   Medication Sig   • estradiol (ESTRACE) 2 MG tablet    • ibuprofen (ADVIL,MOTRIN) 800 MG tablet 800 mg As Needed.   • lisinopril-hydrochlorothiazide (PRINZIDE,ZESTORETIC) 20-12.5 MG per tablet    • omeprazole (PRILOSEC) 40 MG capsule Take 1 capsule by mouth 2 (Two) Times a Day.   • ranitidine (ZANTAC) 300 MG capsule    • simvastatin (ZOCOR) 20 MG tablet      No current facility-administered medications on file prior to visit.          The following portions of the patient's history were reviewed and updated as appropriate: allergies, current medications, past family history, past medical history, past social history, past surgical history and problem list.    Review of Systems   Constitution: Negative.   HENT: Negative.  Negative for congestion and headaches.    Eyes: Negative.    Cardiovascular: Negative.  Negative for chest pain, cyanosis, dyspnea on  "exertion, irregular heartbeat, leg swelling, near-syncope, orthopnea, palpitations, paroxysmal nocturnal dyspnea and syncope.   Respiratory: Negative.  Negative for shortness of breath.    Hematologic/Lymphatic: Negative.    Musculoskeletal: Negative.    Gastrointestinal: Negative.    Neurological: Negative.           Objective:     /80 (BP Location: Left arm, Patient Position: Sitting)  Pulse 86  Ht 61\" (154.9 cm)  Wt 221 lb 6.4 oz (100 kg)  LMP 09/30/2016 (Exact Date)  SpO2 98%  BMI 41.83 kg/m2  Physical Exam   Constitutional: She appears well-developed and well-nourished. No distress.   HENT:   Head: Normocephalic and atraumatic.   Mouth/Throat: Oropharynx is clear and moist. No oropharyngeal exudate.   Eyes: Conjunctivae and EOM are normal. Pupils are equal, round, and reactive to light. No scleral icterus.   Neck: Normal range of motion. Neck supple. No JVD present. No tracheal deviation present. No thyromegaly present.   Cardiovascular: Normal rate, regular rhythm, normal heart sounds and intact distal pulses.  PMI is not displaced.  Exam reveals no gallop, no friction rub and no decreased pulses.    No murmur heard.  Pulses:       Carotid pulses are 3+ on the right side, and 3+ on the left side.       Radial pulses are 3+ on the right side, and 3+ on the left side.   Pulmonary/Chest: Effort normal and breath sounds normal. No respiratory distress. She has no wheezes. She has no rales. She exhibits no tenderness.   Abdominal: Soft. Bowel sounds are normal. She exhibits no distension, no abdominal bruit and no mass. There is no splenomegaly or hepatomegaly. There is no tenderness. There is no rebound and no guarding.   Musculoskeletal: Normal range of motion. She exhibits no edema, tenderness or deformity.   Lymphadenopathy:     She has no cervical adenopathy.   Neurological: She is alert. She has normal reflexes. No cranial nerve deficit. She exhibits normal muscle tone. Coordination normal. "   Skin: Skin is warm and dry. No rash noted. She is not diaphoretic. No erythema.   Psychiatric: She has a normal mood and affect. Her behavior is normal. Judgment and thought content normal.         Lab Review  Lab Results   Component Value Date     08/29/2017    K 4.1 08/29/2017     08/29/2017    BUN 11 08/29/2017    CREATININE 0.80 08/29/2017    GLUCOSE 87 10/03/2016    GLU 89 08/29/2017    CALCIUM 9.6 08/29/2017    ALT 24 08/29/2017    ALKPHOS 100 08/29/2017    LABIL2 1.7 08/29/2017     No results found for: CKTOTAL  Lab Results   Component Value Date    WBC 7.39 08/29/2017    HGB 13.8 08/29/2017    HCT 43.3 08/29/2017     08/29/2017     No results found for: INR  No results found for: MG  Lab Results   Component Value Date    TSH 2.150 08/29/2017     No results found for: BNP  Lab Results   Component Value Date    CHLPL 198 08/29/2017    TRIG 135 08/29/2017    HDL 45 08/29/2017    VLDL 27 08/29/2017         Procedures  Interpretation Summary stress test 9/8/17  · Normal baseline ECG  · No ECG evidence of exercise-induced myocardial ischemia   Interpretation Summary Echo 9/8/17  · No significant valvular heart disease  · Normal left ventricular cavity size and wall thickness noted.  · Estimated EF appears to be in the range of 61 - 65%  · There is no evidence of pericardial effusion            I personally viewed and interpreted the patient's LAB data         Assessment:     1. Preoperative clearance    2. Essential hypertension    3. Mixed hyperlipidemia    4. Obesity, Class III, BMI 40-49.9 (morbid obesity)          Plan:      Patient is cleared for surgery from cardiac standpoint.  She does not have any significant cardiac disease at this time.  LV ejection fraction is normal and there is no evidence of exercise-induced myocardial ischemia.  She does not present high risk from cardiac standpoint.  Her biggest risk is obesity.        Return if symptoms worsen or fail to improve.

## 2017-12-28 ENCOUNTER — OFFICE VISIT (OUTPATIENT)
Dept: SURGERY | Facility: CLINIC | Age: 37
End: 2017-12-28

## 2017-12-28 VITALS
HEIGHT: 61 IN | RESPIRATION RATE: 17 BRPM | WEIGHT: 198 LBS | HEART RATE: 87 BPM | DIASTOLIC BLOOD PRESSURE: 69 MMHG | BODY MASS INDEX: 37.38 KG/M2 | TEMPERATURE: 98 F | SYSTOLIC BLOOD PRESSURE: 119 MMHG

## 2017-12-28 DIAGNOSIS — R49.0 HOARSENESS, CHRONIC: Primary | ICD-10-CM

## 2017-12-28 DIAGNOSIS — K44.9 HIATAL HERNIA: ICD-10-CM

## 2017-12-28 DIAGNOSIS — K21.9 GASTROESOPHAGEAL REFLUX DISEASE, ESOPHAGITIS PRESENCE NOT SPECIFIED: ICD-10-CM

## 2017-12-28 DIAGNOSIS — E66.09 OBESITY DUE TO EXCESS CALORIES, UNSPECIFIED OBESITY SEVERITY: ICD-10-CM

## 2017-12-28 DIAGNOSIS — K29.00 ACUTE SUPERFICIAL GASTRITIS WITHOUT HEMORRHAGE: ICD-10-CM

## 2017-12-28 PROCEDURE — 99214 OFFICE O/P EST MOD 30 MIN: CPT | Performed by: SURGERY

## 2018-01-25 ENCOUNTER — OFFICE VISIT (OUTPATIENT)
Dept: SURGERY | Facility: CLINIC | Age: 38
End: 2018-01-25

## 2018-01-25 ENCOUNTER — APPOINTMENT (OUTPATIENT)
Dept: PREADMISSION TESTING | Facility: HOSPITAL | Age: 38
End: 2018-01-25

## 2018-01-25 VITALS
BODY MASS INDEX: 36.25 KG/M2 | TEMPERATURE: 98.2 F | HEART RATE: 93 BPM | HEIGHT: 61 IN | RESPIRATION RATE: 16 BRPM | WEIGHT: 192 LBS | SYSTOLIC BLOOD PRESSURE: 134 MMHG | DIASTOLIC BLOOD PRESSURE: 80 MMHG

## 2018-01-25 DIAGNOSIS — K21.00 GASTROESOPHAGEAL REFLUX DISEASE WITH ESOPHAGITIS: Primary | ICD-10-CM

## 2018-01-25 LAB
ALBUMIN SERPL-MCNC: 4.4 G/DL (ref 3.5–5)
ALBUMIN/GLOB SERPL: 1.7 G/DL (ref 1.5–2.5)
ALP SERPL-CCNC: 81 U/L (ref 35–104)
ALT SERPL W P-5'-P-CCNC: 27 U/L (ref 10–36)
ANION GAP SERPL CALCULATED.3IONS-SCNC: 4.7 MMOL/L (ref 3.6–11.2)
AST SERPL-CCNC: 22 U/L (ref 10–30)
BASOPHILS # BLD AUTO: 0.03 10*3/MM3 (ref 0–0.3)
BASOPHILS NFR BLD AUTO: 0.3 % (ref 0–2)
BILIRUB SERPL-MCNC: 0.7 MG/DL (ref 0.2–1.8)
BUN BLD-MCNC: 10 MG/DL (ref 7–21)
BUN/CREAT SERPL: 11.5 (ref 7–25)
CALCIUM SPEC-SCNC: 9.3 MG/DL (ref 7.7–10)
CHLORIDE SERPL-SCNC: 108 MMOL/L (ref 99–112)
CO2 SERPL-SCNC: 28.3 MMOL/L (ref 24.3–31.9)
CREAT BLD-MCNC: 0.87 MG/DL (ref 0.43–1.29)
DEPRECATED RDW RBC AUTO: 45.8 FL (ref 37–54)
EOSINOPHIL # BLD AUTO: 0.11 10*3/MM3 (ref 0–0.7)
EOSINOPHIL NFR BLD AUTO: 1.2 % (ref 0–5)
ERYTHROCYTE [DISTWIDTH] IN BLOOD BY AUTOMATED COUNT: 13.9 % (ref 11.5–14.5)
GFR SERPL CREATININE-BSD FRML MDRD: 73 ML/MIN/1.73
GLOBULIN UR ELPH-MCNC: 2.6 GM/DL
GLUCOSE BLD-MCNC: 134 MG/DL (ref 70–110)
HCT VFR BLD AUTO: 41.2 % (ref 37–47)
HGB BLD-MCNC: 13.3 G/DL (ref 12–16)
IMM GRANULOCYTES # BLD: 0.01 10*3/MM3 (ref 0–0.03)
IMM GRANULOCYTES NFR BLD: 0.1 % (ref 0–0.5)
LYMPHOCYTES # BLD AUTO: 2.52 10*3/MM3 (ref 1–3)
LYMPHOCYTES NFR BLD AUTO: 27.5 % (ref 21–51)
MCH RBC QN AUTO: 30.6 PG (ref 27–33)
MCHC RBC AUTO-ENTMCNC: 32.3 G/DL (ref 33–37)
MCV RBC AUTO: 94.7 FL (ref 80–94)
MONOCYTES # BLD AUTO: 0.39 10*3/MM3 (ref 0.1–0.9)
MONOCYTES NFR BLD AUTO: 4.3 % (ref 0–10)
NEUTROPHILS # BLD AUTO: 6.09 10*3/MM3 (ref 1.4–6.5)
NEUTROPHILS NFR BLD AUTO: 66.6 % (ref 30–70)
OSMOLALITY SERPL CALC.SUM OF ELEC: 282.3 MOSM/KG (ref 273–305)
PLATELET # BLD AUTO: 288 10*3/MM3 (ref 130–400)
PMV BLD AUTO: 10.5 FL (ref 6–10)
POTASSIUM BLD-SCNC: 3.6 MMOL/L (ref 3.5–5.3)
PROT SERPL-MCNC: 7 G/DL (ref 6–8)
RBC # BLD AUTO: 4.35 10*6/MM3 (ref 4.2–5.4)
SODIUM BLD-SCNC: 141 MMOL/L (ref 135–153)
WBC NRBC COR # BLD: 9.15 10*3/MM3 (ref 4.5–12.5)

## 2018-01-25 PROCEDURE — 99214 OFFICE O/P EST MOD 30 MIN: CPT | Performed by: SURGERY

## 2018-01-25 PROCEDURE — 85025 COMPLETE CBC W/AUTO DIFF WBC: CPT | Performed by: SURGERY

## 2018-01-25 PROCEDURE — 80053 COMPREHEN METABOLIC PANEL: CPT | Performed by: SURGERY

## 2018-01-25 NOTE — PROGRESS NOTES
1/25/2018    Patient Information  Yahir Garsia  [address] [place] [address]  1980  [phone] (home)     Chief Complaint   Patient presents with   • Follow-up     FU for Severe Reflux/Weight Loss       HPI  Patient is a 37-year-old white female with severe reflux disease.  She has chronic hoarseness.  She she has undergone all objective measurements confirming severe reflux..    Past history, social history, family history, review of systems, medications, and allergies have all been reviewed and confirmed    Review of Systems:  The Review of Systems has been reviewed and confirmed.    General: weight gain 20lbs  Integumentary: negative  Eyes: glasses  ENT: earache, recurrent sore throat and hoarseness  Respiratory: negative  Gastrointestinal: diarrhea  Cardiovascular: negative  Neurological: headaches  Psychiatric: anxiety  Hematologic/Lymphatic: easy bruising  Genitourinary: negative  Musculoskeletal: negative  Endocrine: hot flashes  Breasts: negative      Patient Active Problem List   Diagnosis   • Leiomyoma   • Encounter for consultation   • Tobacco use   • Hoarseness   • Preoperative clearance   • Obesity, Class III, BMI 40-49.9 (morbid obesity)   • Essential hypertension   • Mixed hyperlipidemia   • Abnormal EKG   • SOB (shortness of breath)         Past Medical History:   Diagnosis Date   • Anxiety    • Chronic laryngitis    • Fatigue    • GERD (gastroesophageal reflux disease)     full history in [facility].  SEVERE, on H2 blocker only due ot insurance not covering PPI.  Loses employment opportunities due to hoarseness from reflux.    • Hyperlipidemia    • Hypertension    • Migraine    • Prediabetes    • Sleep apnea     does not use CPAP due to intolerance         Past Surgical History:   Procedure Laterality Date   • BRAVO PROCEDURE N/A 5/8/2017    Procedure: ESOPHAGOGASTRODUODENOSCOPY AND NAILS;  Surgeon: Morris Solares MD;  Location: [facility];  Service:    • CARDIOVASCULAR STRESS TEST  09/08/2017  "  •  SECTION PRIMARY      done under general anesthesia b/c couldn't get epidural   •  SECTION WITH TUBAL     • DIAGNOSTIC LAPAROSCOPY      looking for endometriosis   • DILATATION AND CURETTAGE N/A 10/4/2016    Procedure: DILATATION AND CURETTAGE WITH FROZEN SECTION;  Surgeon: Rajendra Rico MD;  Location:  [facility] OR;  for abnormal uterine bleeding   • ECHO - CONVERTED  2017   • ENDOMETRIAL FULGURATION     • ENDOSCOPY  2017    in Dr. Beck Gongora   • TOTAL LAPAROSCOPIC HYSTERECTOMY N/A 10/4/2016    Procedure: TOTAL LAPAROSCOPIC HYSTERECTOMY BILATERAL SALPINGO OOPHERECTOMY WITH DAVINCI SI ROBOT;  Surgeon: Rajendra Rico MD;  Location:  [facility] OR;  endometriosis/fibroids   • TUBAL ABDOMINAL LIGATION           Family History   Problem Relation Age of Onset   • No Known Problems Mother    • No Known Problems Father    • Obesity Sister    • Hypertension Sister          Social History   Substance Use Topics   • Smoking status: Current Some Day Smoker     Packs/day: 0.50     Years: 10.00     Types: Cigarettes   • Smokeless tobacco: Never Used      Comment: I not around secondhand smoke in house or car unless visits certain family members   • Alcohol use Yes      Comment: occasional       Current Outpatient Prescriptions   Medication Sig Dispense Refill   • estradiol (ESTRACE) 2 MG tablet      • ibuprofen (ADVIL,MOTRIN) 800 MG tablet 800 mg As Needed.     • lisinopril-hydrochlorothiazide (PRINZIDE,ZESTORETIC) 20-12.5 MG per tablet      • omeprazole (PRILOSEC) 40 MG capsule Take 1 capsule by mouth 2 (Two) Times a Day. 60 capsule 11   • ranitidine (ZANTAC) 300 MG capsule      • simvastatin (ZOCOR) 20 MG tablet        No current facility-administered medications for this visit.          Allergies  Review of patient's allergies indicates no known allergies.    /80  Pulse 93  Temp 98.2 °F (36.8 °C)  Resp 16  Ht 154.9 cm (60.98\")  Wt 87.1 kg (192 lb)  LMP 2016 (Exact Date)  BMI " 36.3 kg/m2     Physical Exam   Constitutional: She is oriented to person, place, and time. She appears well-developed and well-nourished. No distress.   HENT:   Head: Normocephalic.   Right Ear: External ear normal.   Left Ear: External ear normal.   Nose: Nose normal.   Mouth/Throat: Oropharynx is clear and moist.   Eyes: Conjunctivae and EOM are normal. Right eye exhibits no discharge. Left eye exhibits no discharge.   Neck: Normal range of motion. No JVD present. No tracheal deviation present. No thyromegaly present.   Cardiovascular: Normal rate, regular rhythm, normal heart sounds and intact distal pulses.  Exam reveals no gallop and no friction rub.    No murmur heard.  Pulmonary/Chest: Effort normal and breath sounds normal. No stridor. No respiratory distress. She has no wheezes. She has no rales. She exhibits no tenderness.   Abdominal: Soft. Bowel sounds are normal. She exhibits no distension and no mass. There is no tenderness. There is no rebound and no guarding. No hernia.   Genitourinary: Rectal exam shows guaiac negative stool.   Musculoskeletal: Normal range of motion. She exhibits no edema, tenderness or deformity.   Lymphadenopathy:     She has no cervical adenopathy.   Neurological: She is alert and oriented to person, place, and time. She has normal reflexes. She displays normal reflexes. No cranial nerve deficit. She exhibits normal muscle tone. Coordination normal.   Skin: Skin is warm and dry. No rash noted. She is not diaphoretic. No erythema. No pallor.   Psychiatric: She has a normal mood and affect. Her behavior is normal. Judgment and thought content normal.   Nursing note and vitals reviewed.                ASSESSMENT  Severe gastroesophageal reflux disease        PLAN    Laparoscopic Nissen fundoplication and repair of hiatal hernia    Risks, benefits, and possible complications have been discussed in great detail with patient and .   has previously undergone the procedure  by myself.  They understand and agreed to that plan.        Morris Solares MD

## 2018-01-25 NOTE — DISCHARGE INSTRUCTIONS
TAKE the following medications the morning of surgery:  All heart or blood pressure medications    Please discontinue all blood thinners and anticoagulants (except aspirin) prior to surgery as per your surgeon and cardiologist instructions.  Aspirin may be continued up to the day prior to surgery.    HOLD all diabetic medications the morning of surgery as order by physician.    Please follow instructions on use of prep cloths provided by nurse. Return instruction sheet with stickers attached to pre-op nurse on day of surgery.    Arrival time for surgery on 1/29/2018 is 0630 am.    General Instructions:  • Do NOT eat or drink after midnight 1/28/2018 which includes water, mints, or gum.  • You may brush your teeth. Dental appliances that are removable must be taken out day of surgery.  • Do NOT smoke, chew tobacco, or drink alcohol within 24 hours prior to surgery.  • Bring medications in original bottles, any inhalers and if applicable your C-PAP/BI-PAP machine  • Bring any papers given to you in the doctor’s office  • Wear clean, comfortable clothes and socks  • Do NOT wear contact lenses or make-up or dark nail polish.  Bring a case for your glasses if applicable.  • Bring crutches or walker if applicable  • Leave all other valuables and jewelry at home  • If you were given a blood bank armband, continue to wear it until discharged.    Preventing a Surgical Site Infection:  • Shower on the morning of surgery using a fresh bar of anti-bacterial soap (such as Dial) and clean washcloth.  Dry with a clean towel and dress in clean clothing.  • For 2 to 3 days before surgery, avoid shaving with a razor near where you will have surgery because the razor can irritate skin and make it easier to develop an infection.  Ask your surgeon if you will be receiving antibiotics prior to surgery.  • Make sure you, your family, and all healthcare providers clean their hands with soap and water or an alcohol-based hand   before caring for you or your wound.  • If at all possible, quit smoking as many days before surgery as you can.    Day of Surgery:  Upon arrival, a pre-op nurse and anesthesiologist will review your health history, obtain vital signs, and answer questions you may have.  The only belongings needed at this time will be your home medications and if applicable you C-PAP/BI-PAP machine.  If you are staying overnight, your family can leave the rest of your belongings in the car and bring them to your room later.  A pre-op nurse will start an IV and you may receive medication in preparation for surgery.  Due to patient privacy and limited space, only one member of your family will be able to accompany you in the pre-op area.  While you are in surgery your family should notify the waiting room  if they leave the waiting room area and provide a contact number.  Please be aware that surgery does come with discomfort.  We want to make every effort to control your discomfort so please discuss any uncontrolled symptoms with your nurse.  Your doctor will most likely have prescribed pain medications.  If you are going home after surgery you will receive individualized written care instructions before being discharged.  A responsible adult must drive you to and from the hospital on the day of surgery and stay with you for 24 hours.  If you are staying overnight following surgery, you will be transported to your hospital room following the recovery period.

## 2018-01-29 ENCOUNTER — ANESTHESIA (OUTPATIENT)
Dept: PERIOP | Facility: HOSPITAL | Age: 38
End: 2018-01-29

## 2018-01-29 ENCOUNTER — APPOINTMENT (OUTPATIENT)
Dept: GENERAL RADIOLOGY | Facility: HOSPITAL | Age: 38
End: 2018-01-29
Attending: SURGERY

## 2018-01-29 ENCOUNTER — HOSPITAL ENCOUNTER (INPATIENT)
Facility: HOSPITAL | Age: 38
LOS: 1 days | Discharge: HOME | End: 2018-01-30
Attending: SURGERY | Admitting: SURGERY

## 2018-01-29 ENCOUNTER — ANESTHESIA EVENT (OUTPATIENT)
Dept: PERIOP | Facility: HOSPITAL | Age: 38
End: 2018-01-29

## 2018-01-29 DIAGNOSIS — K21.00 GASTROESOPHAGEAL REFLUX DISEASE WITH ESOPHAGITIS: ICD-10-CM

## 2018-01-29 PROBLEM — K21.9 GASTROESOPHAGEAL REFLUX DISEASE: Status: ACTIVE | Noted: 2018-01-29

## 2018-01-29 PROCEDURE — 25010000002 ONDANSETRON PER 1 MG: Performed by: SURGERY

## 2018-01-29 PROCEDURE — 25010000002 MORPHINE SULFATE (PF) 2 MG/ML SOLUTION: Performed by: NURSE ANESTHETIST, CERTIFIED REGISTERED

## 2018-01-29 PROCEDURE — 25010000002 PROPOFOL 10 MG/ML EMULSION: Performed by: NURSE ANESTHETIST, CERTIFIED REGISTERED

## 2018-01-29 PROCEDURE — 25010000002 ONDANSETRON PER 1 MG: Performed by: NURSE ANESTHETIST, CERTIFIED REGISTERED

## 2018-01-29 PROCEDURE — 25010000002 FENTANYL CITRATE (PF) 100 MCG/2ML SOLUTION: Performed by: NURSE ANESTHETIST, CERTIFIED REGISTERED

## 2018-01-29 PROCEDURE — 25010000002 MORPHINE PER 10 MG: Performed by: SURGERY

## 2018-01-29 PROCEDURE — 0DV44ZZ RESTRICTION OF ESOPHAGOGASTRIC JUNCTION, PERCUTANEOUS ENDOSCOPIC APPROACH: ICD-10-PCS | Performed by: SURGERY

## 2018-01-29 PROCEDURE — 25010000002 MIDAZOLAM PER 1 MG: Performed by: NURSE ANESTHETIST, CERTIFIED REGISTERED

## 2018-01-29 PROCEDURE — 0BQT4ZZ REPAIR DIAPHRAGM, PERCUTANEOUS ENDOSCOPIC APPROACH: ICD-10-PCS | Performed by: SURGERY

## 2018-01-29 PROCEDURE — 25010000002 DEXAMETHASONE PER 1 MG: Performed by: NURSE ANESTHETIST, CERTIFIED REGISTERED

## 2018-01-29 PROCEDURE — 43281 LAP PARAESOPHAG HERN REPAIR: CPT | Performed by: SURGERY

## 2018-01-29 PROCEDURE — 25010000002 NEOSTIGMINE 10 MG/10ML SOLUTION: Performed by: NURSE ANESTHETIST, CERTIFIED REGISTERED

## 2018-01-29 PROCEDURE — 25810000003 SODIUM CHLORIDE 0.9 % WITH KCL 20 MEQ 20-0.9 MEQ/L-% SOLUTION: Performed by: SURGERY

## 2018-01-29 RX ORDER — LIDOCAINE HYDROCHLORIDE 20 MG/ML
INJECTION, SOLUTION EPIDURAL; INFILTRATION; INTRACAUDAL; PERINEURAL AS NEEDED
Status: DISCONTINUED | OUTPATIENT
Start: 2018-01-29 | End: 2018-01-29 | Stop reason: SURG

## 2018-01-29 RX ORDER — ONDANSETRON 4 MG/1
4 TABLET, FILM COATED ORAL EVERY 6 HOURS PRN
Status: DISCONTINUED | OUTPATIENT
Start: 2018-01-29 | End: 2018-01-30 | Stop reason: HOSPADM

## 2018-01-29 RX ORDER — PROPOFOL 10 MG/ML
VIAL (ML) INTRAVENOUS AS NEEDED
Status: DISCONTINUED | OUTPATIENT
Start: 2018-01-29 | End: 2018-01-29 | Stop reason: SURG

## 2018-01-29 RX ORDER — PROMETHAZINE HYDROCHLORIDE 25 MG/1
25 TABLET ORAL ONCE AS NEEDED
Status: DISCONTINUED | OUTPATIENT
Start: 2018-01-29 | End: 2018-01-29 | Stop reason: HOSPADM

## 2018-01-29 RX ORDER — SODIUM CHLORIDE 9 MG/ML
INJECTION, SOLUTION INTRAVENOUS AS NEEDED
Status: DISCONTINUED | OUTPATIENT
Start: 2018-01-29 | End: 2018-01-29 | Stop reason: HOSPADM

## 2018-01-29 RX ORDER — DEXAMETHASONE SODIUM PHOSPHATE 10 MG/ML
INJECTION INTRAMUSCULAR; INTRAVENOUS AS NEEDED
Status: DISCONTINUED | OUTPATIENT
Start: 2018-01-29 | End: 2018-01-29 | Stop reason: SURG

## 2018-01-29 RX ORDER — NALOXONE HCL 0.4 MG/ML
0.4 VIAL (ML) INJECTION
Status: DISCONTINUED | OUTPATIENT
Start: 2018-01-29 | End: 2018-01-30 | Stop reason: HOSPADM

## 2018-01-29 RX ORDER — ONDANSETRON 2 MG/ML
4 INJECTION INTRAMUSCULAR; INTRAVENOUS ONCE AS NEEDED
Status: DISCONTINUED | OUTPATIENT
Start: 2018-01-29 | End: 2018-01-29 | Stop reason: HOSPADM

## 2018-01-29 RX ORDER — SODIUM CHLORIDE, SODIUM LACTATE, POTASSIUM CHLORIDE, CALCIUM CHLORIDE 600; 310; 30; 20 MG/100ML; MG/100ML; MG/100ML; MG/100ML
125 INJECTION, SOLUTION INTRAVENOUS CONTINUOUS
Status: DISCONTINUED | OUTPATIENT
Start: 2018-01-29 | End: 2018-01-30 | Stop reason: HOSPADM

## 2018-01-29 RX ORDER — MORPHINE SULFATE 2 MG/ML
2 INJECTION, SOLUTION INTRAMUSCULAR; INTRAVENOUS
Status: DISCONTINUED | OUTPATIENT
Start: 2018-01-29 | End: 2018-01-29 | Stop reason: HOSPADM

## 2018-01-29 RX ORDER — VECURONIUM BROMIDE 1 MG/ML
INJECTION, POWDER, LYOPHILIZED, FOR SOLUTION INTRAVENOUS AS NEEDED
Status: DISCONTINUED | OUTPATIENT
Start: 2018-01-29 | End: 2018-01-29 | Stop reason: SURG

## 2018-01-29 RX ORDER — MIDAZOLAM HYDROCHLORIDE 1 MG/ML
INJECTION INTRAMUSCULAR; INTRAVENOUS AS NEEDED
Status: DISCONTINUED | OUTPATIENT
Start: 2018-01-29 | End: 2018-01-29 | Stop reason: SURG

## 2018-01-29 RX ORDER — IPRATROPIUM BROMIDE AND ALBUTEROL SULFATE 2.5; .5 MG/3ML; MG/3ML
3 SOLUTION RESPIRATORY (INHALATION) ONCE AS NEEDED
Status: DISCONTINUED | OUTPATIENT
Start: 2018-01-29 | End: 2018-01-29 | Stop reason: HOSPADM

## 2018-01-29 RX ORDER — OXYCODONE HYDROCHLORIDE AND ACETAMINOPHEN 5; 325 MG/1; MG/1
1 TABLET ORAL ONCE AS NEEDED
Status: DISCONTINUED | OUTPATIENT
Start: 2018-01-29 | End: 2018-01-29 | Stop reason: HOSPADM

## 2018-01-29 RX ORDER — PHENTERMINE HYDROCHLORIDE 37.5 MG/1
37.5 TABLET ORAL
COMMUNITY
End: 2018-07-26

## 2018-01-29 RX ORDER — BUPIVACAINE HYDROCHLORIDE 5 MG/ML
INJECTION, SOLUTION EPIDURAL; INTRACAUDAL AS NEEDED
Status: DISCONTINUED | OUTPATIENT
Start: 2018-01-29 | End: 2018-01-29 | Stop reason: HOSPADM

## 2018-01-29 RX ORDER — OXYCODONE AND ACETAMINOPHEN 10; 325 MG/1; MG/1
1 TABLET ORAL EVERY 4 HOURS PRN
Status: DISCONTINUED | OUTPATIENT
Start: 2018-01-29 | End: 2018-01-30 | Stop reason: HOSPADM

## 2018-01-29 RX ORDER — ONDANSETRON 2 MG/ML
4 INJECTION INTRAMUSCULAR; INTRAVENOUS EVERY 6 HOURS PRN
Status: DISCONTINUED | OUTPATIENT
Start: 2018-01-29 | End: 2018-01-30 | Stop reason: HOSPADM

## 2018-01-29 RX ORDER — IBUPROFEN 200 MG
200 TABLET ORAL DAILY PRN
Status: DISCONTINUED | OUTPATIENT
Start: 2018-01-29 | End: 2018-01-30 | Stop reason: HOSPADM

## 2018-01-29 RX ORDER — ONDANSETRON 2 MG/ML
INJECTION INTRAMUSCULAR; INTRAVENOUS AS NEEDED
Status: DISCONTINUED | OUTPATIENT
Start: 2018-01-29 | End: 2018-01-29 | Stop reason: SURG

## 2018-01-29 RX ORDER — LISINOPRIL AND HYDROCHLOROTHIAZIDE 20; 12.5 MG/1; MG/1
1 TABLET ORAL DAILY
Status: CANCELLED | OUTPATIENT
Start: 2018-01-29

## 2018-01-29 RX ORDER — CHLORHEXIDINE GLUCONATE 500 MG/1
1 CLOTH TOPICAL 2 TIMES DAILY
Status: DISCONTINUED | OUTPATIENT
Start: 2018-01-29 | End: 2018-01-29

## 2018-01-29 RX ORDER — ONDANSETRON 4 MG/1
4 TABLET, ORALLY DISINTEGRATING ORAL EVERY 6 HOURS PRN
Status: DISCONTINUED | OUTPATIENT
Start: 2018-01-29 | End: 2018-01-30 | Stop reason: HOSPADM

## 2018-01-29 RX ORDER — MAGNESIUM HYDROXIDE 1200 MG/15ML
LIQUID ORAL AS NEEDED
Status: DISCONTINUED | OUTPATIENT
Start: 2018-01-29 | End: 2018-01-29 | Stop reason: HOSPADM

## 2018-01-29 RX ORDER — SODIUM CHLORIDE 0.9 % (FLUSH) 0.9 %
1-10 SYRINGE (ML) INJECTION AS NEEDED
Status: DISCONTINUED | OUTPATIENT
Start: 2018-01-29 | End: 2018-01-29 | Stop reason: HOSPADM

## 2018-01-29 RX ORDER — NEOSTIGMINE METHYLSULFATE 1 MG/ML
INJECTION, SOLUTION INTRAVENOUS AS NEEDED
Status: DISCONTINUED | OUTPATIENT
Start: 2018-01-29 | End: 2018-01-29 | Stop reason: SURG

## 2018-01-29 RX ORDER — FENTANYL CITRATE 50 UG/ML
INJECTION, SOLUTION INTRAMUSCULAR; INTRAVENOUS AS NEEDED
Status: DISCONTINUED | OUTPATIENT
Start: 2018-01-29 | End: 2018-01-29 | Stop reason: SURG

## 2018-01-29 RX ORDER — PROMETHAZINE HYDROCHLORIDE 25 MG/ML
12.5 INJECTION, SOLUTION INTRAMUSCULAR; INTRAVENOUS ONCE AS NEEDED
Status: DISCONTINUED | OUTPATIENT
Start: 2018-01-29 | End: 2018-01-29 | Stop reason: HOSPADM

## 2018-01-29 RX ORDER — ACETAMINOPHEN, ASPIRIN AND CAFFEINE 250; 250; 65 MG/1; MG/1; MG/1
1 TABLET, FILM COATED ORAL DAILY PRN
COMMUNITY
End: 2018-08-08

## 2018-01-29 RX ORDER — MEPERIDINE HYDROCHLORIDE 25 MG/ML
12.5 INJECTION INTRAMUSCULAR; INTRAVENOUS; SUBCUTANEOUS
Status: DISCONTINUED | OUTPATIENT
Start: 2018-01-29 | End: 2018-01-29 | Stop reason: HOSPADM

## 2018-01-29 RX ORDER — MORPHINE SULFATE 10 MG/ML
6 INJECTION INTRAMUSCULAR; INTRAVENOUS; SUBCUTANEOUS
Status: DISCONTINUED | OUTPATIENT
Start: 2018-01-29 | End: 2018-01-30 | Stop reason: CLARIF

## 2018-01-29 RX ORDER — FAMOTIDINE 10 MG/ML
INJECTION, SOLUTION INTRAVENOUS AS NEEDED
Status: DISCONTINUED | OUTPATIENT
Start: 2018-01-29 | End: 2018-01-29 | Stop reason: SURG

## 2018-01-29 RX ORDER — SODIUM CHLORIDE AND POTASSIUM CHLORIDE 150; 900 MG/100ML; MG/100ML
100 INJECTION, SOLUTION INTRAVENOUS CONTINUOUS
Status: DISCONTINUED | OUTPATIENT
Start: 2018-01-29 | End: 2018-01-30 | Stop reason: HOSPADM

## 2018-01-29 RX ORDER — GLYCOPYRROLATE 0.2 MG/ML
INJECTION INTRAMUSCULAR; INTRAVENOUS AS NEEDED
Status: DISCONTINUED | OUTPATIENT
Start: 2018-01-29 | End: 2018-01-29 | Stop reason: SURG

## 2018-01-29 RX ORDER — CHLORHEXIDINE GLUCONATE 500 MG/1
1 CLOTH TOPICAL
COMMUNITY
End: 2018-07-26

## 2018-01-29 RX ORDER — PROMETHAZINE HYDROCHLORIDE 25 MG/1
25 SUPPOSITORY RECTAL ONCE AS NEEDED
Status: DISCONTINUED | OUTPATIENT
Start: 2018-01-29 | End: 2018-01-29 | Stop reason: HOSPADM

## 2018-01-29 RX ADMIN — FENTANYL CITRATE 100 MCG: 50 INJECTION INTRAMUSCULAR; INTRAVENOUS at 07:36

## 2018-01-29 RX ADMIN — CEFAZOLIN 1 G: 1 INJECTION, POWDER, FOR SOLUTION INTRAMUSCULAR; INTRAVENOUS; PARENTERAL at 15:53

## 2018-01-29 RX ADMIN — MIDAZOLAM HYDROCHLORIDE 2 MG: 1 INJECTION, SOLUTION INTRAMUSCULAR; INTRAVENOUS at 07:36

## 2018-01-29 RX ADMIN — FAMOTIDINE 20 MG: 10 INJECTION, SOLUTION INTRAVENOUS at 07:36

## 2018-01-29 RX ADMIN — MORPHINE SULFATE 6 MG: 10 INJECTION INTRAVENOUS at 12:26

## 2018-01-29 RX ADMIN — MORPHINE SULFATE 6 MG: 10 INJECTION INTRAVENOUS at 21:23

## 2018-01-29 RX ADMIN — NEOSTIGMINE METHYLSULFATE 3 MG: 1 INJECTION, SOLUTION INTRAVENOUS at 09:11

## 2018-01-29 RX ADMIN — FENTANYL CITRATE 50 MCG: 50 INJECTION INTRAMUSCULAR; INTRAVENOUS at 08:32

## 2018-01-29 RX ADMIN — CEFAZOLIN 1 G: 1 INJECTION, POWDER, FOR SOLUTION INTRAMUSCULAR; INTRAVENOUS; PARENTERAL at 07:36

## 2018-01-29 RX ADMIN — SODIUM CHLORIDE, POTASSIUM CHLORIDE, SODIUM LACTATE AND CALCIUM CHLORIDE 125 ML/HR: 600; 310; 30; 20 INJECTION, SOLUTION INTRAVENOUS at 07:01

## 2018-01-29 RX ADMIN — FENTANYL CITRATE 100 MCG: 50 INJECTION INTRAMUSCULAR; INTRAVENOUS at 09:08

## 2018-01-29 RX ADMIN — LIDOCAINE HYDROCHLORIDE 100 MG: 20 INJECTION, SOLUTION EPIDURAL; INFILTRATION; INTRACAUDAL; PERINEURAL at 07:42

## 2018-01-29 RX ADMIN — MORPHINE SULFATE 2 MG: 2 INJECTION, SOLUTION INTRAMUSCULAR; INTRAVENOUS at 09:40

## 2018-01-29 RX ADMIN — DEXAMETHASONE SODIUM PHOSPHATE 4 MG: 10 INJECTION INTRAMUSCULAR; INTRAVENOUS at 07:46

## 2018-01-29 RX ADMIN — ONDANSETRON 4 MG: 2 INJECTION, SOLUTION INTRAMUSCULAR; INTRAVENOUS at 17:41

## 2018-01-29 RX ADMIN — MORPHINE SULFATE 6 MG: 10 INJECTION INTRAVENOUS at 16:02

## 2018-01-29 RX ADMIN — ONDANSETRON 4 MG: 2 INJECTION, SOLUTION INTRAMUSCULAR; INTRAVENOUS at 07:36

## 2018-01-29 RX ADMIN — CEFAZOLIN 1 G: 1 INJECTION, POWDER, FOR SOLUTION INTRAMUSCULAR; INTRAVENOUS; PARENTERAL at 23:10

## 2018-01-29 RX ADMIN — PROPOFOL 150 MG: 10 INJECTION, EMULSION INTRAVENOUS at 07:42

## 2018-01-29 RX ADMIN — VECURONIUM BROMIDE 5 MG: 1 INJECTION, POWDER, LYOPHILIZED, FOR SOLUTION INTRAVENOUS at 07:42

## 2018-01-29 RX ADMIN — GLYCOPYRROLATE 0.4 MG: 0.2 INJECTION INTRAMUSCULAR; INTRAVENOUS at 08:08

## 2018-01-29 RX ADMIN — POTASSIUM CHLORIDE AND SODIUM CHLORIDE 100 ML/HR: 900; 150 INJECTION, SOLUTION INTRAVENOUS at 12:16

## 2018-01-29 NOTE — ANESTHESIA PROCEDURE NOTES
Airway  Urgency: elective    Date/Time: 1/29/2018 7:36 AM  Airway not difficult    General Information and Staff    Patient location during procedure: OR  Anesthesiologist: RAMYA NELSON  CRNA: NIYA GALLAGHER    Indications and Patient Condition  Indications for airway management: airway protection    Preoxygenated: yes  MILS not maintained throughout  Mask difficulty assessment: 0 - not attempted    Final Airway Details  Final airway type: endotracheal airway      Successful airway: ETT  Cuffed: yes   Successful intubation technique: direct laryngoscopy  Endotracheal tube insertion site: oral  Blade: Hart  Blade size: #2  ETT size: 7.5 mm  Cormack-Lehane Classification: grade I - full view of glottis  Placement verified by: chest auscultation and capnometry   Measured from: lips  ETT to lips (cm): 22  Number of attempts at approach: 1    Additional Comments  Atraumatic ETT placement, dentition unchanged.

## 2018-01-29 NOTE — ANESTHESIA POSTPROCEDURE EVALUATION
Patient: Yahir Garsia    Procedure Summary     Date Anesthesia Start Anesthesia Stop Room / Location    01/29/18 0736 0925  [facility] / [facility]       Procedure Diagnosis Surgeon Provider    NISSEN FUNDOPLICATION LAPAROSCOPIC  (N/A Abdomen); HIATAL HERNIA REPAIR LAPAROSCOPIC (N/A Abdomen) Gastroesophageal reflux disease with esophagitis  (Gastroesophageal reflux disease with esophagitis [K21.0]) MD Noah Urbano MD          Anesthesia Type: general  Last vitals  BP   113/74 (01/29/18 1130)   Temp   96.5 °F (35.8 °C) (01/29/18 1130)   Pulse   60 (01/29/18 1130)   Resp   18 (01/29/18 1130)     SpO2   100 % (01/29/18 1100)     Post Anesthesia Care and Evaluation    Patient location during evaluation: bedside  Patient participation: complete - patient participated  Level of consciousness: awake and alert  Pain score: 1  Pain management: adequate  Airway patency: patent  Anesthetic complications: No anesthetic complications  PONV Status: none  Cardiovascular status: acceptable  Respiratory status: acceptable  Hydration status: acceptable

## 2018-01-29 NOTE — ANESTHESIA PREPROCEDURE EVALUATION
Anesthesia Evaluation     Patient summary reviewed and Nursing notes reviewed   history of anesthetic complications: PONV  NPO Solid Status: > 8 hours  NPO Liquid Status: > 8 hours     Airway   Mallampati: II  TM distance: >3 FB  Neck ROM: full  no difficulty expected  Dental - normal exam     Comment: Small chip upper R incisor    Pulmonary - normal exam   (+) sleep apnea (mild),   Cardiovascular - normal exam    (+) hypertension, hyperlipidemia      Neuro/Psych  (+) psychiatric history Anxiety,     GI/Hepatic/Renal/Endo    (+) obesity,  GERD,     Musculoskeletal (-) negative ROS    Abdominal  - normal exam    Bowel sounds: normal.   Substance History - negative use     OB/GYN negative ob/gyn ROS         Other                                                Anesthesia Plan    ASA 2     general     intravenous induction   Anesthetic plan and risks discussed with patient.    Plan discussed with CRNA.

## 2018-01-30 VITALS
WEIGHT: 193 LBS | SYSTOLIC BLOOD PRESSURE: 140 MMHG | HEIGHT: 61 IN | RESPIRATION RATE: 20 BRPM | TEMPERATURE: 98.5 F | HEART RATE: 90 BPM | OXYGEN SATURATION: 96 % | DIASTOLIC BLOOD PRESSURE: 78 MMHG | BODY MASS INDEX: 36.44 KG/M2

## 2018-01-30 PROCEDURE — 94799 UNLISTED PULMONARY SVC/PX: CPT

## 2018-01-30 PROCEDURE — 99024 POSTOP FOLLOW-UP VISIT: CPT | Performed by: SURGERY

## 2018-01-30 PROCEDURE — 25810000003 SODIUM CHLORIDE 0.9 % WITH KCL 20 MEQ 20-0.9 MEQ/L-% SOLUTION: Performed by: SURGERY

## 2018-01-30 PROCEDURE — 25010000002 MORPHINE PER 10 MG

## 2018-01-30 PROCEDURE — 25010000002 MORPHINE SULFATE (PF) 8 MG/ML SOLUTION: Performed by: SURGERY

## 2018-01-30 PROCEDURE — 25010000002 ENOXAPARIN PER 10 MG: Performed by: SURGERY

## 2018-01-30 RX ORDER — MORPHINE SULFATE 4 MG/ML
INJECTION, SOLUTION INTRAMUSCULAR; INTRAVENOUS
Status: COMPLETED
Start: 2018-01-30 | End: 2018-01-30

## 2018-01-30 RX ORDER — MORPHINE SULFATE 8 MG/ML
6 INJECTION INTRAMUSCULAR; INTRAVENOUS; SUBCUTANEOUS
Status: DISCONTINUED | OUTPATIENT
Start: 2018-01-30 | End: 2018-01-30 | Stop reason: HOSPADM

## 2018-01-30 RX ORDER — OXYCODONE HYDROCHLORIDE AND ACETAMINOPHEN 5; 325 MG/1; MG/1
TABLET ORAL
Qty: 30 TABLET | Refills: 0 | Status: SHIPPED | OUTPATIENT
Start: 2018-01-30 | End: 2018-07-26

## 2018-01-30 RX ORDER — MORPHINE SULFATE 8 MG/ML
6 INJECTION INTRAMUSCULAR; INTRAVENOUS; SUBCUTANEOUS
Status: DISCONTINUED | OUTPATIENT
Start: 2018-01-30 | End: 2018-01-30

## 2018-01-30 RX ADMIN — MORPHINE SULFATE 6 MG: 8 INJECTION INTRAVENOUS at 13:23

## 2018-01-30 RX ADMIN — ENOXAPARIN SODIUM 40 MG: 40 INJECTION SUBCUTANEOUS at 08:35

## 2018-01-30 RX ADMIN — POTASSIUM CHLORIDE AND SODIUM CHLORIDE 100 ML/HR: 900; 150 INJECTION, SOLUTION INTRAVENOUS at 06:12

## 2018-01-30 RX ADMIN — MORPHINE SULFATE 6 MG: 4 INJECTION, SOLUTION INTRAMUSCULAR; INTRAVENOUS at 04:57

## 2018-02-06 ENCOUNTER — OFFICE VISIT (OUTPATIENT)
Dept: SURGERY | Facility: CLINIC | Age: 38
End: 2018-02-06

## 2018-02-06 VITALS
WEIGHT: 190 LBS | BODY MASS INDEX: 35.87 KG/M2 | TEMPERATURE: 98.4 F | HEART RATE: 90 BPM | SYSTOLIC BLOOD PRESSURE: 142 MMHG | HEIGHT: 61 IN | DIASTOLIC BLOOD PRESSURE: 92 MMHG

## 2018-02-06 DIAGNOSIS — K21.00 GASTROESOPHAGEAL REFLUX DISEASE WITH ESOPHAGITIS: Primary | ICD-10-CM

## 2018-02-06 DIAGNOSIS — R49.0 HOARSENESS, CHRONIC: ICD-10-CM

## 2018-02-06 PROCEDURE — 99024 POSTOP FOLLOW-UP VISIT: CPT | Performed by: SURGERY

## 2018-02-06 NOTE — PROGRESS NOTES
2/6/2018    Patient Information  Yahir Garsia  [address] [place] [address]  1980  [phone] (home)     Chief Complaint   Patient presents with   • Post-op     Lap Nissen       HPI  Patient is a 37-year-old white female who is one-week status post laparoscopic Nissen fundoplication.  She is doing well.  She still is sore.  Minimal dysphagia.  Taking a soft diet.  Her voice sounds much better which was a main problem she was having.    Review of Systems:  The Past medical history, family history, social history, medication list, allergies, and Review of Systems has been reviewed and confirmed.    General: weight gain 20lbs  Integumentary: negative  Eyes: glasses  ENT: earache, recurrent sore throat and hoarseness  Respiratory: negative  Gastrointestinal: diarrhea  Cardiovascular: negative  Neurological: headaches  Psychiatric: anxiety  Hematologic/Lymphatic: easy bruising  Genitourinary: negative  Musculoskeletal: negative  Endocrine: hot flashes  Breasts: negative    Patient Active Problem List   Diagnosis   • Leiomyoma   • Encounter for consultation   • Tobacco use   • Hoarseness   • Preoperative clearance   • Obesity, Class III, BMI 40-49.9 (morbid obesity)   • Essential hypertension   • Mixed hyperlipidemia   • Abnormal EKG   • SOB (shortness of breath)   • Gastroesophageal reflux disease with esophagitis   • Gastroesophageal reflux disease         Past Medical History:   Diagnosis Date   • Anxiety    • Chronic laryngitis    • Fatigue    • GERD (gastroesophageal reflux disease)     full history in [facility].  SEVERE, on H2 blocker only due ot insurance not covering PPI.  Loses employment opportunities due to hoarseness from reflux.    • Hyperlipidemia    • Hypertension    • Kidney stones    • Migraine    • PONV (postoperative nausea and vomiting)    • Prediabetes    • Sleep apnea     does not use CPAP due to intolerance   • Spinal headache          Past Surgical History:   Procedure Laterality Date   •  ABDOMINAL SURGERY     • BRAVO PROCEDURE N/A 2017    Procedure: ESOPHAGOGASTRODUODENOSCOPY AND NAILS;  Surgeon: Morris Solares MD;  Location: [facility] OR;  Service:    • CARDIOVASCULAR STRESS TEST  2017   •  SECTION PRIMARY      done under general anesthesia b/c couldn't get epidural   •  SECTION WITH TUBAL     • DIAGNOSTIC LAPAROSCOPY      looking for endometriosis   • DILATATION AND CURETTAGE N/A 10/4/2016    Procedure: DILATATION AND CURETTAGE WITH FROZEN SECTION;  Surgeon: Rajendra Rico MD;  Location: [facility] OR;  for abnormal uterine bleeding   • ECHO - CONVERTED  2017   • ENDOMETRIAL FULGURATION     • ENDOSCOPY      in Dr. Beck Gongora   • HIATAL HERNIA REPAIR N/A 2018    Procedure: HIATAL HERNIA REPAIR LAPAROSCOPIC;  Surgeon: Morris Solares MD;  Location: [facility] OR;  Service:    • NISSEN FUNDOPLICATION N/A 2018    Procedure: NISSEN FUNDOPLICATION LAPAROSCOPIC ;  Surgeon: Morris Solares MD;  Location: [facility] OR;  Service:    • TOTAL LAPAROSCOPIC HYSTERECTOMY N/A 10/4/2016    Procedure: TOTAL LAPAROSCOPIC HYSTERECTOMY BILATERAL SALPINGO OOPHERECTOMY WITH DAVINCI SI ROBOT;  Surgeon: Rajendra Rico MD;  Location: [facility] OR;  endometriosis/fibroids   • TUBAL ABDOMINAL LIGATION           Family History   Problem Relation Age of Onset   • No Known Problems Mother    • No Known Problems Father    • Obesity Sister    • Hypertension Sister          Social History   Substance Use Topics   • Smoking status: Current Some Day Smoker     Packs/day: 0.50     Years: 10.00     Types: Cigarettes   • Smokeless tobacco: Never Used      Comment: I not around secondhand smoke in house or car unless visits certain family members   • Alcohol use Yes      Comment: occasional       Current Outpatient Prescriptions   Medication Sig Dispense Refill   • aspirin-acetaminophen-caffeine (EXCEDRIN EXTRA STRENGTH) 250-250-65 MG per tablet Take 1 tablet by mouth Daily As Needed for  "Headache.     • Chlorhexidine Gluconate Cloth 2 % pads Apply 1 application topically.     • ibuprofen (ADVIL,MOTRIN) 800 MG tablet 800 mg Daily As Needed for Headache.     • lisinopril-hydrochlorothiazide (PRINZIDE,ZESTORETIC) 20-12.5 MG per tablet 1 tablet Daily.     • oxyCODONE-acetaminophen (PERCOCET) 5-325 MG per tablet Take 1 or 2 tablets by mouth every 4 hours when necessary for pain 30 tablet 0   • phentermine (ADIPEX-P) 37.5 MG tablet Take 37.5 mg by mouth Every Morning Before Breakfast.       No current facility-administered medications for this visit.          Allergies  Review of patient's allergies indicates no known allergies.    /92  Pulse 90  Temp 98.4 °F (36.9 °C)  Ht 154.9 cm (60.98\")  Wt 86.2 kg (190 lb)  LMP 09/30/2016 (Exact Date)  BMI 35.92 kg/m2     Physical Exam  37-year-old white female in no distress  Abdomen appropriately tender.  Wounds look good            ASSESSMENT  Status post Nissen fundoplication with excellent progress        PLAN    Return in 1 month.  Slowly advance diet and activity.          This document signed by Morris Solares MD February 6, 2018 9:16 AM   "

## 2018-03-15 ENCOUNTER — OFFICE VISIT (OUTPATIENT)
Dept: SURGERY | Facility: CLINIC | Age: 38
End: 2018-03-15

## 2018-03-15 VITALS
HEART RATE: 90 BPM | BODY MASS INDEX: 34.74 KG/M2 | WEIGHT: 184 LBS | HEIGHT: 61 IN | TEMPERATURE: 98.3 F | DIASTOLIC BLOOD PRESSURE: 76 MMHG | RESPIRATION RATE: 16 BRPM | SYSTOLIC BLOOD PRESSURE: 119 MMHG

## 2018-03-15 DIAGNOSIS — K44.9 HIATAL HERNIA: ICD-10-CM

## 2018-03-15 DIAGNOSIS — E66.09 OBESITY DUE TO EXCESS CALORIES, UNSPECIFIED OBESITY SEVERITY: ICD-10-CM

## 2018-03-15 DIAGNOSIS — Z72.0 TOBACCO USE: ICD-10-CM

## 2018-03-15 DIAGNOSIS — K21.00 GASTROESOPHAGEAL REFLUX DISEASE WITH ESOPHAGITIS: Primary | ICD-10-CM

## 2018-03-15 DIAGNOSIS — R49.0 HOARSENESS, CHRONIC: ICD-10-CM

## 2018-03-15 PROCEDURE — 99024 POSTOP FOLLOW-UP VISIT: CPT | Performed by: SURGERY

## 2018-03-15 NOTE — PROGRESS NOTES
3/15/2018    Patient Information  Yahir Garsia  [address] [place] [address]  1980  [phone] (home)     Chief Complaint   Patient presents with   • Follow-up     1 Month FU Lap Nissen/Constipation       HPI  Patient is a 37-year-old white female who is 6 weeks status post laparoscopic Nissen fundoplication.  She had severe hoarseness prior to the procedure.  She says her voice is back to normal.  No heartburn.  She does have occasional dysphagia.  Overall she is glad she had procedure.  Her main complaint is constipation    Review of Systems:  The Past medical history, family history, social history, medication list, allergies, and Review of Systems has been reviewed and confirmed.    General: weight gain 20lbs  Integumentary: negative  Eyes: glasses  ENT: earache, recurrent sore throat and hoarseness  Respiratory: negative  Gastrointestinal: diarrhea  Cardiovascular: negative  Neurological: headaches  Psychiatric: anxiety  Hematologic/Lymphatic: easy bruising  Genitourinary: negative  Musculoskeletal: negative  Endocrine: hot flashes  Breasts: negative      Patient Active Problem List   Diagnosis   • Leiomyoma   • Encounter for consultation   • Tobacco use   • Hoarseness   • Preoperative clearance   • Obesity, Class III, BMI 40-49.9 (morbid obesity)   • Essential hypertension   • Mixed hyperlipidemia   • Abnormal EKG   • SOB (shortness of breath)   • Gastroesophageal reflux disease with esophagitis   • Gastroesophageal reflux disease         Past Medical History:   Diagnosis Date   • Anxiety    • Chronic laryngitis    • Fatigue    • GERD (gastroesophageal reflux disease)     full history in [facility].  SEVERE, on H2 blocker only due ot insurance not covering PPI.  Loses employment opportunities due to hoarseness from reflux.    • Hyperlipidemia    • Hypertension    • Kidney stones    • Migraine    • PONV (postoperative nausea and vomiting)    • Prediabetes    • Sleep apnea     does not use CPAP due to  intolerance   • Spinal headache          Past Surgical History:   Procedure Laterality Date   • ABDOMINAL SURGERY     • BRAVO PROCEDURE N/A 2017    Procedure: ESOPHAGOGASTRODUODENOSCOPY AND NAILS;  Surgeon: Morris Solares MD;  Location: [facility] OR;  Service:    • CARDIOVASCULAR STRESS TEST  2017   •  SECTION PRIMARY      done under general anesthesia b/c couldn't get epidural   •  SECTION WITH TUBAL     • DIAGNOSTIC LAPAROSCOPY      looking for endometriosis   • DILATATION AND CURETTAGE N/A 10/4/2016    Procedure: DILATATION AND CURETTAGE WITH FROZEN SECTION;  Surgeon: Rajendra Rico MD;  Location: [facility] OR;  for abnormal uterine bleeding   • ECHO - CONVERTED  2017   • ENDOMETRIAL FULGURATION     • ENDOSCOPY      in Dr. Beck Gongora   • HIATAL HERNIA REPAIR N/A 2018    Procedure: HIATAL HERNIA REPAIR LAPAROSCOPIC;  Surgeon: Morris Solares MD;  Location: [facility] OR;  Service:    • NISSEN FUNDOPLICATION N/A 2018    Procedure: NISSEN FUNDOPLICATION LAPAROSCOPIC ;  Surgeon: Morris Solares MD;  Location: [facility] OR;  Service:    • TOTAL LAPAROSCOPIC HYSTERECTOMY N/A 10/4/2016    Procedure: TOTAL LAPAROSCOPIC HYSTERECTOMY BILATERAL SALPINGO OOPHERECTOMY WITH DAVINCI SI ROBOT;  Surgeon: Rajendra Rico MD;  Location: [facility] OR;  endometriosis/fibroids   • TUBAL ABDOMINAL LIGATION           Family History   Problem Relation Age of Onset   • No Known Problems Mother    • No Known Problems Father    • Obesity Sister    • Hypertension Sister          Social History   Substance Use Topics   • Smoking status: Current Some Day Smoker     Packs/day: 0.50     Years: 10.00     Types: Cigarettes   • Smokeless tobacco: Never Used      Comment: I not around secondhand smoke in house or car unless visits certain family members   • Alcohol use Yes      Comment: occasional       Current Outpatient Prescriptions   Medication Sig Dispense Refill   • aspirin-acetaminophen-caffeine  "(EXCEDRIN EXTRA STRENGTH) 250-250-65 MG per tablet Take 1 tablet by mouth Daily As Needed for Headache.     • Chlorhexidine Gluconate Cloth 2 % pads Apply 1 application topically.     • ibuprofen (ADVIL,MOTRIN) 800 MG tablet 800 mg Daily As Needed for Headache.     • lisinopril-hydrochlorothiazide (PRINZIDE,ZESTORETIC) 20-12.5 MG per tablet 1 tablet Daily.     • oxyCODONE-acetaminophen (PERCOCET) 5-325 MG per tablet Take 1 or 2 tablets by mouth every 4 hours when necessary for pain 30 tablet 0   • phentermine (ADIPEX-P) 37.5 MG tablet Take 37.5 mg by mouth Every Morning Before Breakfast.       No current facility-administered medications for this visit.          Allergies  Review of patient's allergies indicates no known allergies.    /76   Pulse 90   Temp 98.3 °F (36.8 °C)   Resp 16   Ht 154.9 cm (60.98\")   Wt 83.5 kg (184 lb)   LMP 09/30/2016 (Exact Date)   BMI 34.78 kg/m²      Physical Exam  Gen. 37 white female in no distress  Abdomen soft, wounds okay            ASSESSMENT  Status post Nissen fundoplication doing well with occasional constipation        PLAN    MiraLAX daily.  Return when necessary      patient given information on weight loss and smoking cessation.    This document signed by Morris Solares MD March 15, 2018 11:54 AM   "

## 2018-04-19 ENCOUNTER — TRANSCRIBE ORDERS (OUTPATIENT)
Dept: GENERAL RADIOLOGY | Facility: HOSPITAL | Age: 38
End: 2018-04-19

## 2018-04-19 ENCOUNTER — HOSPITAL ENCOUNTER (OUTPATIENT)
Dept: GENERAL RADIOLOGY | Facility: HOSPITAL | Age: 38
Discharge: HOME | End: 2018-04-19
Admitting: NURSE PRACTITIONER

## 2018-04-19 DIAGNOSIS — M79.641 RIGHT HAND PAIN: Primary | ICD-10-CM

## 2018-04-19 DIAGNOSIS — M25.531 RIGHT WRIST PAIN: ICD-10-CM

## 2018-04-19 DIAGNOSIS — M79.641 RIGHT HAND PAIN: ICD-10-CM

## 2018-04-19 PROCEDURE — 73110 X-RAY EXAM OF WRIST: CPT | Performed by: RADIOLOGY

## 2018-04-19 PROCEDURE — 73130 X-RAY EXAM OF HAND: CPT

## 2018-04-19 PROCEDURE — 73130 X-RAY EXAM OF HAND: CPT | Performed by: RADIOLOGY

## 2018-04-19 PROCEDURE — 73110 X-RAY EXAM OF WRIST: CPT

## 2018-07-14 ENCOUNTER — HOSPITAL ENCOUNTER (EMERGENCY)
Facility: HOSPITAL | Age: 38
Discharge: HOME | End: 2018-07-14
Attending: EMERGENCY MEDICINE | Admitting: EMERGENCY MEDICINE

## 2018-07-14 ENCOUNTER — APPOINTMENT (OUTPATIENT)
Dept: CT IMAGING | Facility: HOSPITAL | Age: 38
End: 2018-07-14

## 2018-07-14 VITALS
DIASTOLIC BLOOD PRESSURE: 77 MMHG | HEART RATE: 86 BPM | SYSTOLIC BLOOD PRESSURE: 111 MMHG | OXYGEN SATURATION: 99 % | WEIGHT: 182 LBS | HEIGHT: 61 IN | RESPIRATION RATE: 18 BRPM | BODY MASS INDEX: 34.36 KG/M2 | TEMPERATURE: 98.3 F

## 2018-07-14 DIAGNOSIS — S39.011A STRAIN OF ABDOMINAL MUSCLE, INITIAL ENCOUNTER: Primary | ICD-10-CM

## 2018-07-14 LAB
ALBUMIN SERPL-MCNC: 4.8 G/DL (ref 3.5–5)
ALBUMIN/GLOB SERPL: 1.7 G/DL (ref 1.5–2.5)
ALP SERPL-CCNC: 93 U/L (ref 35–104)
ALT SERPL W P-5'-P-CCNC: 19 U/L (ref 10–36)
ANION GAP SERPL CALCULATED.3IONS-SCNC: 5.7 MMOL/L (ref 3.6–11.2)
AST SERPL-CCNC: 20 U/L (ref 10–30)
BASOPHILS # BLD AUTO: 0.04 10*3/MM3 (ref 0–0.3)
BASOPHILS NFR BLD AUTO: 0.4 % (ref 0–2)
BILIRUB SERPL-MCNC: 0.9 MG/DL (ref 0.2–1.8)
BILIRUB UR QL STRIP: NEGATIVE
BUN BLD-MCNC: 12 MG/DL (ref 7–21)
BUN/CREAT SERPL: 12.4 (ref 7–25)
CALCIUM SPEC-SCNC: 9.4 MG/DL (ref 7.7–10)
CHLORIDE SERPL-SCNC: 110 MMOL/L (ref 99–112)
CLARITY UR: CLEAR
CO2 SERPL-SCNC: 27.3 MMOL/L (ref 24.3–31.9)
COLOR UR: YELLOW
CREAT BLD-MCNC: 0.97 MG/DL (ref 0.43–1.29)
CRP SERPL-MCNC: 1.08 MG/DL (ref 0–0.99)
DEPRECATED RDW RBC AUTO: 45.1 FL (ref 37–54)
EOSINOPHIL # BLD AUTO: 0.1 10*3/MM3 (ref 0–0.7)
EOSINOPHIL NFR BLD AUTO: 1.1 % (ref 0–5)
ERYTHROCYTE [DISTWIDTH] IN BLOOD BY AUTOMATED COUNT: 13.6 % (ref 11.5–14.5)
GFR SERPL CREATININE-BSD FRML MDRD: 65 ML/MIN/1.73
GLOBULIN UR ELPH-MCNC: 2.8 GM/DL
GLUCOSE BLD-MCNC: 101 MG/DL (ref 70–110)
GLUCOSE UR STRIP-MCNC: NEGATIVE MG/DL
HCT VFR BLD AUTO: 40.3 % (ref 37–47)
HGB BLD-MCNC: 13 G/DL (ref 12–16)
HGB UR QL STRIP.AUTO: NEGATIVE
IMM GRANULOCYTES # BLD: 0.01 10*3/MM3 (ref 0–0.03)
IMM GRANULOCYTES NFR BLD: 0.1 % (ref 0–0.5)
KETONES UR QL STRIP: NEGATIVE
LEUKOCYTE ESTERASE UR QL STRIP.AUTO: NEGATIVE
LIPASE SERPL-CCNC: 32 U/L (ref 13–60)
LYMPHOCYTES # BLD AUTO: 3.63 10*3/MM3 (ref 1–3)
LYMPHOCYTES NFR BLD AUTO: 38.2 % (ref 21–51)
MCH RBC QN AUTO: 30.6 PG (ref 27–33)
MCHC RBC AUTO-ENTMCNC: 32.3 G/DL (ref 33–37)
MCV RBC AUTO: 94.8 FL (ref 80–94)
MONOCYTES # BLD AUTO: 0.55 10*3/MM3 (ref 0.1–0.9)
MONOCYTES NFR BLD AUTO: 5.8 % (ref 0–10)
NEUTROPHILS # BLD AUTO: 5.18 10*3/MM3 (ref 1.4–6.5)
NEUTROPHILS NFR BLD AUTO: 54.4 % (ref 30–70)
NITRITE UR QL STRIP: NEGATIVE
OSMOLALITY SERPL CALC.SUM OF ELEC: 284.9 MOSM/KG (ref 273–305)
PH UR STRIP.AUTO: 6 [PH] (ref 5–8)
PLATELET # BLD AUTO: 328 10*3/MM3 (ref 130–400)
PMV BLD AUTO: 9.8 FL (ref 6–10)
POTASSIUM BLD-SCNC: 3.4 MMOL/L (ref 3.5–5.3)
PROT SERPL-MCNC: 7.6 G/DL (ref 6–8)
PROT UR QL STRIP: NEGATIVE
RBC # BLD AUTO: 4.25 10*6/MM3 (ref 4.2–5.4)
SODIUM BLD-SCNC: 143 MMOL/L (ref 135–153)
SP GR UR STRIP: 1.01 (ref 1–1.03)
UROBILINOGEN UR QL STRIP: NORMAL
WBC NRBC COR # BLD: 9.51 10*3/MM3 (ref 4.5–12.5)

## 2018-07-14 PROCEDURE — 86140 C-REACTIVE PROTEIN: CPT | Performed by: EMERGENCY MEDICINE

## 2018-07-14 PROCEDURE — 85025 COMPLETE CBC W/AUTO DIFF WBC: CPT | Performed by: EMERGENCY MEDICINE

## 2018-07-14 PROCEDURE — 81003 URINALYSIS AUTO W/O SCOPE: CPT | Performed by: PHYSICIAN ASSISTANT

## 2018-07-14 PROCEDURE — 74176 CT ABD & PELVIS W/O CONTRAST: CPT

## 2018-07-14 PROCEDURE — 96375 TX/PRO/DX INJ NEW DRUG ADDON: CPT

## 2018-07-14 PROCEDURE — 96361 HYDRATE IV INFUSION ADD-ON: CPT

## 2018-07-14 PROCEDURE — 25010000002 MORPHINE PER 10 MG: Performed by: EMERGENCY MEDICINE

## 2018-07-14 PROCEDURE — 99284 EMERGENCY DEPT VISIT MOD MDM: CPT

## 2018-07-14 PROCEDURE — 74176 CT ABD & PELVIS W/O CONTRAST: CPT | Performed by: RADIOLOGY

## 2018-07-14 PROCEDURE — 96374 THER/PROPH/DIAG INJ IV PUSH: CPT

## 2018-07-14 PROCEDURE — 25010000002 ORPHENADRINE CITRATE PER 60 MG: Performed by: PHYSICIAN ASSISTANT

## 2018-07-14 PROCEDURE — 80053 COMPREHEN METABOLIC PANEL: CPT | Performed by: EMERGENCY MEDICINE

## 2018-07-14 PROCEDURE — 25010000002 ONDANSETRON PER 1 MG: Performed by: EMERGENCY MEDICINE

## 2018-07-14 PROCEDURE — 83690 ASSAY OF LIPASE: CPT | Performed by: EMERGENCY MEDICINE

## 2018-07-14 RX ORDER — ORPHENADRINE CITRATE 30 MG/ML
60 INJECTION INTRAMUSCULAR; INTRAVENOUS ONCE
Status: COMPLETED | OUTPATIENT
Start: 2018-07-14 | End: 2018-07-14

## 2018-07-14 RX ORDER — SODIUM CHLORIDE 0.9 % (FLUSH) 0.9 %
10 SYRINGE (ML) INJECTION AS NEEDED
Status: DISCONTINUED | OUTPATIENT
Start: 2018-07-14 | End: 2018-07-15 | Stop reason: HOSPADM

## 2018-07-14 RX ORDER — ORPHENADRINE CITRATE 100 MG/1
100 TABLET, EXTENDED RELEASE ORAL 2 TIMES DAILY PRN
Qty: 20 TABLET | Refills: 0 | Status: SHIPPED | OUTPATIENT
Start: 2018-07-14 | End: 2018-07-26

## 2018-07-14 RX ORDER — ONDANSETRON 2 MG/ML
4 INJECTION INTRAMUSCULAR; INTRAVENOUS ONCE
Status: COMPLETED | OUTPATIENT
Start: 2018-07-14 | End: 2018-07-14

## 2018-07-14 RX ADMIN — ORPHENADRINE CITRATE 60 MG: 30 INJECTION INTRAMUSCULAR; INTRAVENOUS at 21:55

## 2018-07-14 RX ADMIN — ONDANSETRON 4 MG: 2 INJECTION, SOLUTION INTRAMUSCULAR; INTRAVENOUS at 19:56

## 2018-07-14 RX ADMIN — SODIUM CHLORIDE 1000 ML: 9 INJECTION, SOLUTION INTRAVENOUS at 19:56

## 2018-07-14 RX ADMIN — MORPHINE SULFATE 4 MG: 4 INJECTION, SOLUTION INTRAMUSCULAR; INTRAVENOUS at 19:58

## 2018-07-15 NOTE — ED PROVIDER NOTES
Subjective     History provided by:  Patient  Flank Pain   Pain location:  R flank  Pain quality: aching    Pain radiates to:  Does not radiate  Pain severity:  Moderate  Onset quality:  Sudden  Duration:  1 day  Timing:  Constant  Progression:  Worsening  Chronicity:  New  Associated symptoms: nausea    Associated symptoms: no chest pain, no dysuria and no fever        Review of Systems   Constitutional: Negative.  Negative for fever.   HENT: Negative.    Respiratory: Negative.    Cardiovascular: Negative.  Negative for chest pain.   Gastrointestinal: Positive for nausea. Negative for abdominal pain.   Endocrine: Negative.    Genitourinary: Positive for flank pain. Negative for dysuria.   Skin: Negative.    Neurological: Negative.    Psychiatric/Behavioral: Negative.    All other systems reviewed and are negative.      Past Medical History:   Diagnosis Date   • Anxiety    • Chronic laryngitis    • Fatigue    • GERD (gastroesophageal reflux disease)     full history in HPI.  SEVERE, on H2 blocker only due ot insurance not covering PPI.  Loses employment opportunities due to hoarseness from reflux.    • Hyperlipidemia    • Hypertension    • Kidney stones    • Migraine    • PONV (postoperative nausea and vomiting)    • Prediabetes    • Sleep apnea     does not use CPAP due to intolerance   • Spinal headache        No Known Allergies    Past Surgical History:   Procedure Laterality Date   • ABDOMINAL SURGERY     • BRAVO PROCEDURE N/A 2017    Procedure: ESOPHAGOGASTRODUODENOSCOPY AND NAILS;  Surgeon: Morris Solares MD;  Location: [facility];  Service:    • CARDIOVASCULAR STRESS TEST  2017   •  SECTION PRIMARY      done under general anesthesia b/c couldn't get epidural   •  SECTION WITH TUBAL     • DIAGNOSTIC LAPAROSCOPY      looking for endometriosis   • DILATATION AND CURETTAGE N/A 10/4/2016    Procedure: DILATATION AND CURETTAGE WITH FROZEN SECTION;  Surgeon: Rajendra Rico MD;  Location:   [facility];  for abnormal uterine bleeding   • ECHO - CONVERTED  09/08/2017   • ENDOMETRIAL FULGURATION     • ENDOSCOPY  2017    in Dr. Beck Gongora   • HIATAL HERNIA REPAIR N/A 1/29/2018    Procedure: HIATAL HERNIA REPAIR LAPAROSCOPIC;  Surgeon: Morris Solares MD;  Location:  [facility];  Service:    • NISSEN FUNDOPLICATION N/A 1/29/2018    Procedure: NISSEN FUNDOPLICATION LAPAROSCOPIC ;  Surgeon: Morris Solares MD;  Location: [facility] OR;  Service:    • TOTAL LAPAROSCOPIC HYSTERECTOMY N/A 10/4/2016    Procedure: TOTAL LAPAROSCOPIC HYSTERECTOMY BILATERAL SALPINGO OOPHERECTOMY WITH DAVINCI SI ROBOT;  Surgeon: Rajendra Rico MD;  Location:  [facility];  endometriosis/fibroids   • TUBAL ABDOMINAL LIGATION  2006       Family History   Problem Relation Age of Onset   • No Known Problems Mother    • No Known Problems Father    • Obesity Sister    • Hypertension Sister        Social History     Social History   • Marital status:      Spouse name: Heath Garsia   • Number of children: 2   • Years of education: high School [employer]      Occupational History   • Unemployed       Social History Main Topics   • Smoking status: Current Some Day Smoker     Packs/day: 0.50     Years: 10.00     Types: Cigarettes   • Smokeless tobacco: Never Used      Comment: I not around secondhand smoke in house or car unless visits certain family members   • Alcohol use Yes      Comment: occasional   • Drug use: No   • Sexual activity: Defer      Comment:       Other Topics Concern   • Not on file     Social History Narrative    Lives with  and children.  Unemployed due to not being able to find work due to hoarse voice.  Denied disability due to hoarseness not that severe.             Objective   Physical Exam   Constitutional: She is oriented to person, place, and time. She appears well-developed and well-nourished. No distress.   HENT:   Head: Normocephalic and atraumatic.   Right Ear: External ear normal.   Left Ear:  External ear normal.   Nose: Nose normal.   Eyes: Conjunctivae are normal.   Neck: Normal range of motion. Neck supple. No JVD present. No tracheal deviation present.   Cardiovascular: Normal rate, regular rhythm and normal heart sounds.    No murmur heard.  Pulmonary/Chest: Effort normal and breath sounds normal. No respiratory distress. She has no wheezes.   Abdominal: Soft. Bowel sounds are normal. There is tenderness (right flank).   Musculoskeletal: Normal range of motion. She exhibits no edema or deformity.   Neurological: She is alert and oriented to person, place, and time. No cranial nerve deficit.   Skin: Skin is warm and dry. No rash noted. She is not diaphoretic. No erythema. No pallor.   Psychiatric: She has a normal mood and affect. Her behavior is normal. Thought content normal.   Nursing note and vitals reviewed.      Procedures           ED Course                  MDM  Number of Diagnoses or Management Options  Strain of abdominal muscle, initial encounter: new and requires workup     Amount and/or Complexity of Data Reviewed  Clinical lab tests: reviewed  Tests in the radiology section of CPT®: reviewed    Risk of Complications, Morbidity, and/or Mortality  Presenting problems: moderate  Diagnostic procedures: moderate  Management options: low    Patient Progress  Patient progress: stable        Final diagnoses:   Strain of abdominal muscle, initial encounter            ADILENE Nolasco  07/15/18 0148

## 2018-07-26 ENCOUNTER — APPOINTMENT (OUTPATIENT)
Dept: GENERAL RADIOLOGY | Facility: HOSPITAL | Age: 38
End: 2018-07-26

## 2018-07-26 ENCOUNTER — HOSPITAL ENCOUNTER (EMERGENCY)
Facility: HOSPITAL | Age: 38
Discharge: HOME | End: 2018-07-26
Attending: EMERGENCY MEDICINE | Admitting: NURSE PRACTITIONER

## 2018-07-26 VITALS
SYSTOLIC BLOOD PRESSURE: 127 MMHG | DIASTOLIC BLOOD PRESSURE: 80 MMHG | TEMPERATURE: 98 F | WEIGHT: 179 LBS | HEART RATE: 104 BPM | BODY MASS INDEX: 33.79 KG/M2 | OXYGEN SATURATION: 98 % | HEIGHT: 61 IN | RESPIRATION RATE: 18 BRPM

## 2018-07-26 DIAGNOSIS — S60.221A CONTUSION OF RIGHT HAND, INITIAL ENCOUNTER: Primary | ICD-10-CM

## 2018-07-26 PROCEDURE — 73130 X-RAY EXAM OF HAND: CPT

## 2018-07-26 PROCEDURE — 99283 EMERGENCY DEPT VISIT LOW MDM: CPT

## 2018-07-26 PROCEDURE — 73130 X-RAY EXAM OF HAND: CPT | Performed by: RADIOLOGY

## 2018-07-26 RX ORDER — IBUPROFEN 800 MG/1
800 TABLET ORAL EVERY 6 HOURS PRN
Qty: 25 TABLET | Refills: 0 | Status: ON HOLD | OUTPATIENT
Start: 2018-07-26 | End: 2018-12-15

## 2018-07-26 RX ORDER — HYDROCODONE BITARTRATE AND ACETAMINOPHEN 10; 325 MG/1; MG/1
1 TABLET ORAL ONCE
Status: COMPLETED | OUTPATIENT
Start: 2018-07-26 | End: 2018-07-26

## 2018-07-26 RX ADMIN — HYDROCODONE BITARTRATE AND ACETAMINOPHEN 1 TABLET: 10; 325 TABLET ORAL at 19:23

## 2018-08-07 ENCOUNTER — OFFICE VISIT (OUTPATIENT)
Dept: ORTHOPEDIC SURGERY | Facility: CLINIC | Age: 38
End: 2018-08-07

## 2018-08-07 ENCOUNTER — HOSPITAL ENCOUNTER (OUTPATIENT)
Dept: GENERAL RADIOLOGY | Facility: HOSPITAL | Age: 38
Discharge: HOME | End: 2018-08-07
Attending: ORTHOPAEDIC SURGERY | Admitting: ORTHOPAEDIC SURGERY

## 2018-08-07 VITALS
BODY MASS INDEX: 33.61 KG/M2 | SYSTOLIC BLOOD PRESSURE: 124 MMHG | WEIGHT: 178 LBS | HEART RATE: 62 BPM | HEIGHT: 61 IN | DIASTOLIC BLOOD PRESSURE: 82 MMHG

## 2018-08-07 DIAGNOSIS — S63.656A SPRAIN OF METACARPOPHALANGEAL (MCP) JOINT OF RIGHT LITTLE FINGER, INITIAL ENCOUNTER: Primary | ICD-10-CM

## 2018-08-07 DIAGNOSIS — S63.654A SPRAIN OF METACARPOPHALANGEAL (MCP) JOINT OF RIGHT RING FINGER, INITIAL ENCOUNTER: ICD-10-CM

## 2018-08-07 PROCEDURE — 99203 OFFICE O/P NEW LOW 30 MIN: CPT | Performed by: ORTHOPAEDIC SURGERY

## 2018-08-07 PROCEDURE — 73130 X-RAY EXAM OF HAND: CPT

## 2018-08-07 PROCEDURE — 73130 X-RAY EXAM OF HAND: CPT | Performed by: RADIOLOGY

## 2018-08-07 RX ORDER — BUDESONIDE AND FORMOTEROL FUMARATE DIHYDRATE 160; 4.5 UG/1; UG/1
2 AEROSOL RESPIRATORY (INHALATION) 2 TIMES DAILY PRN
Status: ON HOLD | COMMUNITY
Start: 2017-01-23 | End: 2018-12-15

## 2018-08-07 NOTE — PROGRESS NOTES
New Patient Visit        Patient: Yahir Garsia  YOB: 1980  Date of encounter: 2018      History of Present Illness:   Yahir Garsia is a 38 y.o. Female who is referred here today by Kathy Rainey for evaluation of acute injury to the right hand sustained 2 weeks ago.  She states she became angry and punched a steering well.  She is complaining of pain and swelling along the fourth and fifth fingers.  She states it's worse with motion.  She states that the swelling has decreased some still has difficulty with motion and pain in the 2 knuckles.  She denies paresthesias    PMH:   Patient Active Problem List   Diagnosis   • Leiomyoma   • Encounter for consultation   • Tobacco use   • Hoarseness   • Preoperative clearance   • Obesity, Class III, BMI 40-49.9 (morbid obesity) (CMS/HCC)   • Essential hypertension   • Mixed hyperlipidemia   • Abnormal EKG   • SOB (shortness of breath)   • Gastroesophageal reflux disease with esophagitis   • Gastroesophageal reflux disease     Past Medical History:   Diagnosis Date   • Anxiety    • Chronic laryngitis    • Fatigue    • GERD (gastroesophageal reflux disease)     full history in HPI.  SEVERE, on H2 blocker only due ot insurance not covering PPI.  Loses employment opportunities due to hoarseness from reflux.    • Hyperlipidemia    • Hypertension    • Kidney stones    • Migraine    • PONV (postoperative nausea and vomiting)    • Prediabetes    • Sleep apnea     does not use CPAP due to intolerance   • Spinal headache        PSH:  Past Surgical History:   Procedure Laterality Date   • ABDOMINAL SURGERY     • BRAVO PROCEDURE N/A 2017    Procedure: ESOPHAGOGASTRODUODENOSCOPY AND NAILS;  Surgeon: Morris Solares MD;  Location: [facility];  Service:    • CARDIOVASCULAR STRESS TEST  2017   •  SECTION PRIMARY      done under general anesthesia b/c couldn't get epidural   •  SECTION WITH TUBAL     • DIAGNOSTIC LAPAROSCOPY      looking  for endometriosis   • DILATATION AND CURETTAGE N/A 10/4/2016    Procedure: DILATATION AND CURETTAGE WITH FROZEN SECTION;  Surgeon: Rajendra Rico MD;  Location:  [facility];  for abnormal uterine bleeding   • ECHO - CONVERTED  09/08/2017   • ENDOMETRIAL FULGURATION     • ENDOSCOPY  2017    in Dr. Beck Gongora   • HIATAL HERNIA REPAIR N/A 1/29/2018    Procedure: HIATAL HERNIA REPAIR LAPAROSCOPIC;  Surgeon: Morris Solares MD;  Location:  [facility];  Service:    • NISSEN FUNDOPLICATION N/A 1/29/2018    Procedure: NISSEN FUNDOPLICATION LAPAROSCOPIC ;  Surgeon: Morris Solares MD;  [facility];  Service:    • TOTAL LAPAROSCOPIC HYSTERECTOMY N/A 10/4/2016    Procedure: TOTAL LAPAROSCOPIC HYSTERECTOMY BILATERAL SALPINGO OOPHERECTOMY WITH DAVINCI SI ROBOT;  Surgeon: Rajendra Rico MD;  Location:  [facility];  endometriosis/fibroids   • TUBAL ABDOMINAL LIGATION  2006       Allergies:   No Known Allergies    Medications:     Current Outpatient Prescriptions:   •  budesonide-formoterol (SYMBICORT) 160-4.5 MCG/ACT inhaler, Inhale 2 puffs Every 12 (Twelve) Hours., Disp: , Rfl:   •  aspirin-acetaminophen-caffeine (EXCEDRIN EXTRA STRENGTH) 250-250-65 MG per tablet, Take 1 tablet by mouth Daily As Needed for Headache., Disp: , Rfl:   •  ibuprofen (ADVIL,MOTRIN) 800 MG tablet, Take 1 tablet by mouth Every 6 (Six) Hours As Needed for Mild Pain ., Disp: 25 tablet, Rfl: 0  •  lisinopril-hydrochlorothiazide (PRINZIDE,ZESTORETIC) 20-12.5 MG per tablet, 1 tablet Daily., Disp: , Rfl:     Social History:  Social History     Social History   • Marital status:      Spouse name: Heath Garsia   • Number of children: 2   • Years of education: high School [employer]      Occupational History   • Unemployed       Social History Main Topics   • Smoking status: Current Some Day Smoker     Packs/day: 0.50     Years: 10.00     Types: Cigarettes   • Smokeless tobacco: Never Used      Comment: I not around secondhand smoke in house or car  "unless visits certain family members   • Alcohol use Yes      Comment: occasional   • Drug use: No   • Sexual activity: Defer      Comment:       Other Topics Concern   • Not on file     Social History Narrative    Lives with  and children.  Unemployed due to not being able to find work due to hoarse voice.  Denied disability due to hoarseness not that severe.         Family History:     Family History   Problem Relation Age of Onset   • No Known Problems Mother    • No Known Problems Father    • Obesity Sister    • Hypertension Sister        Review of Systems:   Review of Systems   Constitutional: Negative.    HENT: Negative.    Eyes: Negative.    Respiratory: Negative.    Cardiovascular: Negative.    Gastrointestinal: Negative.    Endocrine: Negative.    Genitourinary: Negative.    Musculoskeletal: Positive for back pain.   Skin: Negative.    Neurological: Negative.    Hematological: Bruises/bleeds easily.   Psychiatric/Behavioral: Positive for dysphoric mood. The patient is nervous/anxious.        Physical Exam: 38 y.o. female  General Appearance:    Alert and oriented x 3, cooperative, in no acute distress                   Vitals:    08/07/18 1444   BP: 124/82   Pulse: 62   Weight: 80.7 kg (178 lb)   Height: 154.9 cm (61\")              Body mass index is 33.63 kg/m².        Musculoskeletal: Examination of the right hand reveals mild swelling with ecchymosis along the fourth and fifth MCP joints.  She does have limited motion with flexion of the MCP joint  secondary to pain.  There is no gross instability.  No gross deformity.  Her neurovascular status is intact.    Radiology:     3 views of the right hand were reviewed revealing no acute fractures or dislocations.    Assessment    ICD-10-CM ICD-9-CM   1. Sprain of metacarpophalangeal (MCP) joint of right little finger, initial encounter S63.656A 842.12   2. Sprain of metacarpophalangeal (MCP) joint of right ring finger, initial encounter S63.654A " 842.12       Plan:   A 38-year-old female with acute injury to the right hand sustained 2 weeks ago.  X-rays were again reviewed today revealing no acute fractures or dislocations.  She likely strained and contused the fourth and fifth MCP joints.  We would like to immobilize her with a boxer brace.  We have given her prescription to take to [facility] to get fitted for a boxer brace.  She is to wear this full time removing for bathing purposes.  We'll see her back for follow-up in 4 weeks.  X-rays will not be necessary at her follow-up.    Written by, Claudette HEAD, acting as a scribe for Dr. Domingo

## 2018-08-09 NOTE — PROGRESS NOTES
New Patient Visit        Patient: Yahir Garsia  YOB: 1980  Date of encounter: 2018      History of Present Illness:   Yahir Garsia is a 38 y.o. Female who is referred here today by Kathy Rainey for evaluation of acute injury to the right hand sustained 2 weeks ago.  She states she became angry and punched a steering well.  She is complaining of pain and swelling along the fourth and fifth fingers.  She states it's worse with motion.  She states that the swelling has decreased some still has difficulty with motion and pain in the 2 knuckles.  She denies paresthesias    PMH:   Patient Active Problem List   Diagnosis   • Leiomyoma   • Encounter for consultation   • Tobacco use   • Hoarseness   • Preoperative clearance   • Obesity, Class III, BMI 40-49.9 (morbid obesity) (CMS/HCC)   • Essential hypertension   • Mixed hyperlipidemia   • Abnormal EKG   • SOB (shortness of breath)   • Gastroesophageal reflux disease with esophagitis   • Gastroesophageal reflux disease     Past Medical History:   Diagnosis Date   • Anxiety    • Chronic laryngitis    • Fatigue    • GERD (gastroesophageal reflux disease)     full history in HPI.  SEVERE, on H2 blocker only due ot insurance not covering PPI.  Loses employment opportunities due to hoarseness from reflux.    • Hyperlipidemia    • Hypertension    • Kidney stones    • Migraine    • PONV (postoperative nausea and vomiting)    • Prediabetes    • Sleep apnea     does not use CPAP due to intolerance   • Spinal headache        PSH:  Past Surgical History:   Procedure Laterality Date   • ABDOMINAL SURGERY     • BRAVO PROCEDURE N/A 2017    Procedure: ESOPHAGOGASTRODUODENOSCOPY AND NAILS;  Surgeon: Morris Solares MD;  Location: [facility];  Service:    • CARDIOVASCULAR STRESS TEST  2017   •  SECTION PRIMARY      done under general anesthesia b/c couldn't get epidural   •  SECTION WITH TUBAL     • DIAGNOSTIC LAPAROSCOPY      looking  for endometriosis   • DILATATION AND CURETTAGE N/A 10/4/2016    Procedure: DILATATION AND CURETTAGE WITH FROZEN SECTION;  Surgeon: Rajendra Rico MD;  Location:  [facility];  for abnormal uterine bleeding   • ECHO - CONVERTED  09/08/2017   • ENDOMETRIAL FULGURATION     • ENDOSCOPY  2017    in Dr. Beck Gongora   • HIATAL HERNIA REPAIR N/A 1/29/2018    Procedure: HIATAL HERNIA REPAIR LAPAROSCOPIC;  Surgeon: Morris Solares MD;  Location:  [facility];  Service:    • NISSEN FUNDOPLICATION N/A 1/29/2018    Procedure: NISSEN FUNDOPLICATION LAPAROSCOPIC ;  Surgeon: Morris Solares MD;  Location:  [facility];  Service:    • TOTAL LAPAROSCOPIC HYSTERECTOMY N/A 10/4/2016    Procedure: TOTAL LAPAROSCOPIC HYSTERECTOMY BILATERAL SALPINGO OOPHERECTOMY WITH DAVINCI SI ROBOT;  Surgeon: Rajendra Rico MD;  Location:  [facility];  endometriosis/fibroids   • TUBAL ABDOMINAL LIGATION  2006       Allergies:   No Known Allergies    Medications:     Current Outpatient Prescriptions:   •  budesonide-formoterol (SYMBICORT) 160-4.5 MCG/ACT inhaler, Inhale 2 puffs Every 12 (Twelve) Hours., Disp: , Rfl:   •  ibuprofen (ADVIL,MOTRIN) 800 MG tablet, Take 1 tablet by mouth Every 6 (Six) Hours As Needed for Mild Pain ., Disp: 25 tablet, Rfl: 0  •  lisinopril-hydrochlorothiazide (PRINZIDE,ZESTORETIC) 20-12.5 MG per tablet, 1 tablet Daily., Disp: , Rfl:     Social History:  Social History     Social History   • Marital status:      Spouse name: Heath Garsia   • Number of children: 2   • Years of education: high School [employer]      Occupational History   • Unemployed       Social History Main Topics   • Smoking status: Current Some Day Smoker     Packs/day: 0.50     Years: 10.00     Types: Cigarettes   • Smokeless tobacco: Never Used      Comment: I not around secondhand smoke in house or car unless visits certain family members   • Alcohol use Yes      Comment: occasional   • Drug use: No   • Sexual activity: Defer      Comment:    "    Other Topics Concern   • Not on file     Social History Narrative    Lives with  and children.  Unemployed due to not being able to find work due to hoarse voice.  Denied disability due to hoarseness not that severe.         Family History:     Family History   Problem Relation Age of Onset   • No Known Problems Mother    • No Known Problems Father    • Obesity Sister    • Hypertension Sister        Review of Systems:   Review of Systems   Constitutional: Negative.    HENT: Negative.    Eyes: Negative.    Respiratory: Negative.    Cardiovascular: Negative.    Gastrointestinal: Negative.    Endocrine: Negative.    Genitourinary: Negative.    Musculoskeletal: Positive for back pain.   Skin: Negative.    Neurological: Negative.    Hematological: Bruises/bleeds easily.   Psychiatric/Behavioral: Positive for dysphoric mood. The patient is nervous/anxious.        Physical Exam: 38 y.o. female  General Appearance:    Alert and oriented x 3, cooperative, in no acute distress                   Vitals:    08/07/18 1444   BP: 124/82   Pulse: 62   Weight: 80.7 kg (178 lb)   Height: 154.9 cm (61\")              Body mass index is 33.63 kg/m².        Musculoskeletal: Examination of the right hand reveals mild swelling with ecchymosis along the fourth and fifth MCP joints.  She does have limited motion with flexion of the MCP joint  secondary to pain.  There is no gross instability.  No gross deformity.  Her neurovascular status is intact.    Radiology:     3 views of the right hand were reviewed revealing no acute fractures or dislocations.    Assessment    ICD-10-CM ICD-9-CM   1. Sprain of metacarpophalangeal (MCP) joint of right little finger, initial encounter S63.656A 842.12   2. Sprain of metacarpophalangeal (MCP) joint of right ring finger, initial encounter S63.654A 842.12       Plan:   A 38-year-old female with acute injury to the right hand sustained 2 weeks ago.  X-rays were again reviewed today revealing no " acute fractures or dislocations.  She likely strained and contused the fourth and fifth MCP joints.  We would like to immobilize her with a boxer brace.  We have given her prescription to take to [facility] to get fitted for a boxer brace.  She is to wear this full time removing for bathing purposes.  We'll see her back for follow-up in 4 weeks.  X-rays will not be necessary at her follow-up.    Written by, Claudette HEAD, acting as a scribe for Dr. Domingo

## 2018-09-04 ENCOUNTER — OFFICE VISIT (OUTPATIENT)
Dept: ORTHOPEDIC SURGERY | Facility: CLINIC | Age: 38
End: 2018-09-04

## 2018-09-04 ENCOUNTER — HOSPITAL ENCOUNTER (OUTPATIENT)
Dept: GENERAL RADIOLOGY | Facility: HOSPITAL | Age: 38
Discharge: HOME | End: 2018-09-04
Attending: ORTHOPAEDIC SURGERY | Admitting: ORTHOPAEDIC SURGERY

## 2018-09-04 VITALS — BODY MASS INDEX: 33.99 KG/M2 | WEIGHT: 180 LBS | HEIGHT: 61 IN

## 2018-09-04 DIAGNOSIS — M79.641 HAND PAIN, RIGHT: Primary | ICD-10-CM

## 2018-09-04 PROCEDURE — 73130 X-RAY EXAM OF HAND: CPT

## 2018-09-04 PROCEDURE — 73130 X-RAY EXAM OF HAND: CPT | Performed by: RADIOLOGY

## 2018-09-04 PROCEDURE — 99213 OFFICE O/P EST LOW 20 MIN: CPT | Performed by: ORTHOPAEDIC SURGERY

## 2018-09-04 NOTE — PROGRESS NOTES
Patient: Yahir Garsia    YOB: 1980    Chief Complaint   Patient presents with   • Right Hand - Follow-up         History of Present Illness: Patient presents for follow-up of her right hand.  States that is doing better but she still gets some pain and swelling especially along the fifth finger.  Occasionally she gets some numb feeling or tingly feeling through her hand the outside part.  She did have a repeat fall on this.    Past Medical History:   Diagnosis Date   • Anxiety    • Chronic laryngitis    • Fatigue    • GERD (gastroesophageal reflux disease)     full history in HPI.  SEVERE, on H2 blocker only due ot insurance not covering PPI.  Loses employment opportunities due to hoarseness from reflux.    • Hyperlipidemia    • Hypertension    • Kidney stones    • Migraine    • PONV (postoperative nausea and vomiting)    • Prediabetes    • Sleep apnea     does not use CPAP due to intolerance   • Spinal headache         Social History     Social History   • Marital status:      Spouse name: Heath Garsia   • Number of children: 2   • Years of education: high School [employer]      Occupational History   • Unemployed       Social History Main Topics   • Smoking status: Current Some Day Smoker     Packs/day: 0.50     Years: 10.00     Types: Cigarettes   • Smokeless tobacco: Never Used      Comment: I not around secondhand smoke in house or car unless visits certain family members   • Alcohol use Yes      Comment: occasional   • Drug use: No   • Sexual activity: Defer      Comment:       Other Topics Concern   • Not on file     Social History Narrative    Lives with  and children.  Unemployed due to not being able to find work due to hoarse voice.  Denied disability due to hoarseness not that severe.             Physical Exam: 38 y.o. female  General Appearance:    Alert and oriented x 3, cooperative, in no acute distress                   Vitals:    09/04/18 1417   Weight: 81.6 kg  "(180 lb)   Height: 154.9 cm (61\")          Exam today shows no obvious deformity.  She has full extension and full flexion fourth and fifth fingers.  She still has some discomfort over the dorsum of her fifth finger.  There is some minimal swelling noted.  Sensation is grossly intact good capillary refill is noted  Radiology:       X-rays repeated today show no obvious fracture or dislocation.  There is no evidence of any callus forming      Assessment/Plan: Fourth and fifth right metacarpal phalangeal joint sprains.  Clinically the patient is doing better.  Still has some symptoms.  I discussed the fact that there is nothing broken.  Everything seems to be functioning.  Hopefully things will continue to fade away as far as any numbness swelling or achiness in her hand.  She is exhibiting good range of motion.  I will discharge her from the office at this point unless something changes in the future.  We'll let her return to work on Monday            Discussion/Summary:                This chart was completed utilizing the dragon speech recognition software.  Grammatical errors, random word insertions, pronoun errors, and incomplete sentences or occasional consequences of the system due to software limitations, ambient noise, and hardware issues.  Any questions or concerns about the content, text, or information contained within the body of this dictation should be directly addressed to the physician for clarification        This document was signed by Fuentes Domingo M.D. September 4, 2018 3:33 PM  "

## 2018-09-30 ENCOUNTER — APPOINTMENT (OUTPATIENT)
Dept: CT IMAGING | Facility: HOSPITAL | Age: 38
End: 2018-09-30

## 2018-09-30 ENCOUNTER — HOSPITAL ENCOUNTER (EMERGENCY)
Facility: HOSPITAL | Age: 38
Discharge: HOME | End: 2018-10-01
Attending: INTERNAL MEDICINE | Admitting: INTERNAL MEDICINE

## 2018-09-30 DIAGNOSIS — N30.01 ACUTE CYSTITIS WITH HEMATURIA: ICD-10-CM

## 2018-09-30 DIAGNOSIS — N20.0 BILATERAL KIDNEY STONES: Primary | ICD-10-CM

## 2018-09-30 LAB
ALBUMIN SERPL-MCNC: 4.8 G/DL (ref 3.5–5)
ALBUMIN/GLOB SERPL: 1.7 G/DL (ref 1.5–2.5)
ALP SERPL-CCNC: 116 U/L (ref 35–104)
ALT SERPL W P-5'-P-CCNC: 17 U/L (ref 10–36)
ANION GAP SERPL CALCULATED.3IONS-SCNC: 6.2 MMOL/L (ref 3.6–11.2)
AST SERPL-CCNC: 17 U/L (ref 10–30)
BACTERIA UR QL AUTO: ABNORMAL /HPF
BASOPHILS # BLD AUTO: 0.05 10*3/MM3 (ref 0–0.3)
BASOPHILS NFR BLD AUTO: 0.4 % (ref 0–2)
BILIRUB SERPL-MCNC: 0.4 MG/DL (ref 0.2–1.8)
BILIRUB UR QL STRIP: NEGATIVE
BUN BLD-MCNC: 17 MG/DL (ref 7–21)
BUN/CREAT SERPL: 17 (ref 7–25)
CALCIUM SPEC-SCNC: 9.3 MG/DL (ref 7.7–10)
CHLORIDE SERPL-SCNC: 110 MMOL/L (ref 99–112)
CLARITY UR: CLEAR
CO2 SERPL-SCNC: 27.8 MMOL/L (ref 24.3–31.9)
COLOR UR: ABNORMAL
CREAT BLD-MCNC: 1 MG/DL (ref 0.43–1.29)
D-LACTATE SERPL-SCNC: 1.2 MMOL/L (ref 0.5–2)
DEPRECATED RDW RBC AUTO: 45.1 FL (ref 37–54)
EOSINOPHIL # BLD AUTO: 0.18 10*3/MM3 (ref 0–0.7)
EOSINOPHIL NFR BLD AUTO: 1.3 % (ref 0–5)
ERYTHROCYTE [DISTWIDTH] IN BLOOD BY AUTOMATED COUNT: 13.7 % (ref 11.5–14.5)
GFR SERPL CREATININE-BSD FRML MDRD: 62 ML/MIN/1.73
GLOBULIN UR ELPH-MCNC: 2.9 GM/DL
GLUCOSE BLD-MCNC: 74 MG/DL (ref 70–110)
GLUCOSE UR STRIP-MCNC: NEGATIVE MG/DL
HCT VFR BLD AUTO: 42.1 % (ref 37–47)
HGB BLD-MCNC: 13.3 G/DL (ref 12–16)
HGB UR QL STRIP.AUTO: ABNORMAL
HYALINE CASTS UR QL AUTO: ABNORMAL /LPF
IMM GRANULOCYTES # BLD: 0.03 10*3/MM3 (ref 0–0.03)
IMM GRANULOCYTES NFR BLD: 0.2 % (ref 0–0.5)
KETONES UR QL STRIP: NEGATIVE
LEUKOCYTE ESTERASE UR QL STRIP.AUTO: ABNORMAL
LYMPHOCYTES # BLD AUTO: 3.53 10*3/MM3 (ref 1–3)
LYMPHOCYTES NFR BLD AUTO: 25.7 % (ref 21–51)
MCH RBC QN AUTO: 30 PG (ref 27–33)
MCHC RBC AUTO-ENTMCNC: 31.6 G/DL (ref 33–37)
MCV RBC AUTO: 94.8 FL (ref 80–94)
MONOCYTES # BLD AUTO: 0.97 10*3/MM3 (ref 0.1–0.9)
MONOCYTES NFR BLD AUTO: 7.1 % (ref 0–10)
NEUTROPHILS # BLD AUTO: 8.99 10*3/MM3 (ref 1.4–6.5)
NEUTROPHILS NFR BLD AUTO: 65.3 % (ref 30–70)
NITRITE UR QL STRIP: NEGATIVE
OSMOLALITY SERPL CALC.SUM OF ELEC: 287 MOSM/KG (ref 273–305)
PH UR STRIP.AUTO: 5.5 [PH] (ref 5–8)
PLATELET # BLD AUTO: 318 10*3/MM3 (ref 130–400)
PMV BLD AUTO: 9.7 FL (ref 6–10)
POTASSIUM BLD-SCNC: 3.7 MMOL/L (ref 3.5–5.3)
PROT SERPL-MCNC: 7.7 G/DL (ref 6–8)
PROT UR QL STRIP: ABNORMAL
RBC # BLD AUTO: 4.44 10*6/MM3 (ref 4.2–5.4)
RBC # UR: ABNORMAL /HPF
REF LAB TEST METHOD: ABNORMAL
SODIUM BLD-SCNC: 144 MMOL/L (ref 135–153)
SP GR UR STRIP: 1.02 (ref 1–1.03)
SQUAMOUS #/AREA URNS HPF: ABNORMAL /HPF
UROBILINOGEN UR QL STRIP: ABNORMAL
WBC NRBC COR # BLD: 13.75 10*3/MM3 (ref 4.5–12.5)
WBC UR QL AUTO: ABNORMAL /HPF

## 2018-09-30 PROCEDURE — 96375 TX/PRO/DX INJ NEW DRUG ADDON: CPT

## 2018-09-30 PROCEDURE — 81001 URINALYSIS AUTO W/SCOPE: CPT | Performed by: INTERNAL MEDICINE

## 2018-09-30 PROCEDURE — 74176 CT ABD & PELVIS W/O CONTRAST: CPT | Performed by: RADIOLOGY

## 2018-09-30 PROCEDURE — 85025 COMPLETE CBC W/AUTO DIFF WBC: CPT | Performed by: PHYSICIAN ASSISTANT

## 2018-09-30 PROCEDURE — 36415 COLL VENOUS BLD VENIPUNCTURE: CPT

## 2018-09-30 PROCEDURE — 99284 EMERGENCY DEPT VISIT MOD MDM: CPT

## 2018-09-30 PROCEDURE — 83605 ASSAY OF LACTIC ACID: CPT | Performed by: PHYSICIAN ASSISTANT

## 2018-09-30 PROCEDURE — 96361 HYDRATE IV INFUSION ADD-ON: CPT

## 2018-09-30 PROCEDURE — 80053 COMPREHEN METABOLIC PANEL: CPT | Performed by: PHYSICIAN ASSISTANT

## 2018-09-30 PROCEDURE — 25010000002 MORPHINE PER 10 MG: Performed by: INTERNAL MEDICINE

## 2018-09-30 PROCEDURE — 74176 CT ABD & PELVIS W/O CONTRAST: CPT

## 2018-09-30 PROCEDURE — 87040 BLOOD CULTURE FOR BACTERIA: CPT | Performed by: PHYSICIAN ASSISTANT

## 2018-09-30 PROCEDURE — 25010000002 KETOROLAC TROMETHAMINE PER 15 MG: Performed by: PHYSICIAN ASSISTANT

## 2018-09-30 RX ORDER — PHENAZOPYRIDINE HYDROCHLORIDE 200 MG/1
200 TABLET, FILM COATED ORAL ONCE
Status: COMPLETED | OUTPATIENT
Start: 2018-09-30 | End: 2018-09-30

## 2018-09-30 RX ORDER — MORPHINE SULFATE 2 MG/ML
2 INJECTION, SOLUTION INTRAMUSCULAR; INTRAVENOUS ONCE
Status: COMPLETED | OUTPATIENT
Start: 2018-09-30 | End: 2018-09-30

## 2018-09-30 RX ORDER — SODIUM CHLORIDE 0.9 % (FLUSH) 0.9 %
10 SYRINGE (ML) INJECTION AS NEEDED
Status: DISCONTINUED | OUTPATIENT
Start: 2018-09-30 | End: 2018-10-01 | Stop reason: HOSPADM

## 2018-09-30 RX ORDER — KETOROLAC TROMETHAMINE 30 MG/ML
30 INJECTION, SOLUTION INTRAMUSCULAR; INTRAVENOUS ONCE
Status: COMPLETED | OUTPATIENT
Start: 2018-09-30 | End: 2018-09-30

## 2018-09-30 RX ADMIN — PHENAZOPYRIDINE HYDROCHLORIDE 200 MG: 200 TABLET ORAL at 22:55

## 2018-09-30 RX ADMIN — SODIUM CHLORIDE 1000 ML: 9 INJECTION, SOLUTION INTRAVENOUS at 22:55

## 2018-09-30 RX ADMIN — KETOROLAC TROMETHAMINE 30 MG: 30 INJECTION, SOLUTION INTRAMUSCULAR at 22:55

## 2018-09-30 RX ADMIN — MORPHINE SULFATE 2 MG: 2 INJECTION, SOLUTION INTRAMUSCULAR; INTRAVENOUS at 23:56

## 2018-10-01 VITALS
TEMPERATURE: 98.7 F | BODY MASS INDEX: 34.93 KG/M2 | WEIGHT: 185 LBS | HEART RATE: 90 BPM | DIASTOLIC BLOOD PRESSURE: 78 MMHG | HEIGHT: 61 IN | OXYGEN SATURATION: 100 % | SYSTOLIC BLOOD PRESSURE: 135 MMHG | RESPIRATION RATE: 18 BRPM

## 2018-10-01 PROCEDURE — 25010000002 CEFTRIAXONE: Performed by: PHYSICIAN ASSISTANT

## 2018-10-01 PROCEDURE — 96365 THER/PROPH/DIAG IV INF INIT: CPT

## 2018-10-01 RX ORDER — AMOXICILLIN AND CLAVULANATE POTASSIUM 875; 125 MG/1; MG/1
1 TABLET, FILM COATED ORAL 2 TIMES DAILY
Qty: 14 TABLET | Refills: 0 | Status: SHIPPED | OUTPATIENT
Start: 2018-10-01 | End: 2018-10-08

## 2018-10-01 RX ADMIN — CEFTRIAXONE 1 G: 1 INJECTION, POWDER, FOR SOLUTION INTRAMUSCULAR; INTRAVENOUS at 00:15

## 2018-10-01 NOTE — DISCHARGE INSTRUCTIONS
Please increase your fluid intake and complete your antibiotic regimen. Please see your PCP in 3 days or return to ER if symptoms worsen.

## 2018-10-05 LAB
BACTERIA SPEC AEROBE CULT: NORMAL
BACTERIA SPEC AEROBE CULT: NORMAL

## 2018-10-08 ENCOUNTER — HOSPITAL ENCOUNTER (OUTPATIENT)
Dept: GENERAL RADIOLOGY | Facility: HOSPITAL | Age: 38
Discharge: HOME | End: 2018-10-08
Attending: UROLOGY | Admitting: UROLOGY

## 2018-10-08 ENCOUNTER — OFFICE VISIT (OUTPATIENT)
Dept: UROLOGY | Facility: CLINIC | Age: 38
End: 2018-10-08

## 2018-10-08 VITALS — BODY MASS INDEX: 34.93 KG/M2 | HEIGHT: 61 IN | WEIGHT: 185 LBS

## 2018-10-08 DIAGNOSIS — N20.0 KIDNEY STONE: Primary | ICD-10-CM

## 2018-10-08 DIAGNOSIS — N20.0 KIDNEY STONE: ICD-10-CM

## 2018-10-08 LAB
BILIRUB BLD-MCNC: NEGATIVE MG/DL
CLARITY, POC: CLEAR
COLOR UR: YELLOW
GLUCOSE UR STRIP-MCNC: NEGATIVE MG/DL
KETONES UR QL: NEGATIVE
LEUKOCYTE EST, POC: NEGATIVE
NITRITE UR-MCNC: NEGATIVE MG/ML
PH UR: 5.5 [PH] (ref 5–8)
PROT UR STRIP-MCNC: NEGATIVE MG/DL
RBC # UR STRIP: NEGATIVE /UL
SP GR UR: 1.02 (ref 1–1.03)
UROBILINOGEN UR QL: NORMAL

## 2018-10-08 PROCEDURE — 99204 OFFICE O/P NEW MOD 45 MIN: CPT | Performed by: UROLOGY

## 2018-10-08 PROCEDURE — 74018 RADEX ABDOMEN 1 VIEW: CPT | Performed by: RADIOLOGY

## 2018-10-08 PROCEDURE — 74018 RADEX ABDOMEN 1 VIEW: CPT

## 2018-10-08 RX ORDER — HYDROCODONE BITARTRATE AND ACETAMINOPHEN 5; 325 MG/1; MG/1
TABLET ORAL
Qty: 20 TABLET | Refills: 0 | Status: ON HOLD | OUTPATIENT
Start: 2018-10-08 | End: 2018-12-14

## 2018-10-08 RX ORDER — GENTAMICIN SULFATE 80 MG/50ML
80 INJECTION, SOLUTION INTRAVENOUS ONCE
Status: CANCELLED | OUTPATIENT
Start: 2018-11-02 | End: 2018-11-02

## 2018-10-08 NOTE — PROGRESS NOTES
Chief Complaint:          Chief Complaint   Patient presents with   • Nephrolithiasis       HPI:   38 y.o. female.    38-year-old white female with a  left midpole renal stone pole calculus about 8 mm presents with severe pain.  She had a CT and KUB she was voiding blood last time she had 3 on the  the left.  She is allergic to ibuprofen she has no allergies she's had 2 C-sections.  She has high blood pressure she's had previous lithotripsy and cystoscopy.  Past Medical History:        Past Medical History:   Diagnosis Date   • Anxiety    • Chronic laryngitis    • Fatigue    • GERD (gastroesophageal reflux disease)     full history in HPI.  SEVERE, on H2 blocker only due ot insurance not covering PPI.  Loses employment opportunities due to hoarseness from reflux.    • Hyperlipidemia    • Hypertension    • Kidney stones    • Migraine    • PONV (postoperative nausea and vomiting)    • Prediabetes    • Sleep apnea     does not use CPAP due to intolerance   • Spinal headache          Current Meds:     Current Outpatient Prescriptions   Medication Sig Dispense Refill   • amoxicillin-clavulanate (AUGMENTIN) 875-125 MG per tablet Take 1 tablet by mouth 2 (Two) Times a Day for 7 days. 14 tablet 0   • budesonide-formoterol (SYMBICORT) 160-4.5 MCG/ACT inhaler Inhale 2 puffs Every 12 (Twelve) Hours.     • ibuprofen (ADVIL,MOTRIN) 800 MG tablet Take 1 tablet by mouth Every 6 (Six) Hours As Needed for Mild Pain . 25 tablet 0   • lisinopril-hydrochlorothiazide (PRINZIDE,ZESTORETIC) 20-12.5 MG per tablet 1 tablet Daily.       No current facility-administered medications for this visit.         Allergies:      No Known Allergies     Past Surgical History:     Past Surgical History:   Procedure Laterality Date   • ABDOMINAL SURGERY     • BRAVO PROCEDURE N/A 5/8/2017    Procedure: ESOPHAGOGASTRODUODENOSCOPY AND NAILS;  Surgeon: Morris Solares MD;  Location: [facility];  Service:    • CARDIOVASCULAR STRESS TEST  09/08/2017   •   SECTION PRIMARY      done under general anesthesia b/c couldn't get epidural   •  SECTION WITH TUBAL     • DIAGNOSTIC LAPAROSCOPY      looking for endometriosis   • DILATATION AND CURETTAGE N/A 10/4/2016    Procedure: DILATATION AND CURETTAGE WITH FROZEN SECTION;  Surgeon: Rajendra Rico MD;  Location:  [facility] OR;  for abnormal uterine bleeding   • ECHO - CONVERTED  2017   • ENDOMETRIAL FULGURATION     • ENDOSCOPY  2017    in Dr. Beck Gongora   • HIATAL HERNIA REPAIR N/A 2018    Procedure: HIATAL HERNIA REPAIR LAPAROSCOPIC;  Surgeon: Morris Solares MD;  Location: [facility] OR;  Service:    • NISSEN FUNDOPLICATION N/A 2018    Procedure: NISSEN FUNDOPLICATION LAPAROSCOPIC ;  Surgeon: Morris Solares MD;  Location: [facility] OR;  Service:    • TOTAL LAPAROSCOPIC HYSTERECTOMY N/A 10/4/2016    Procedure: TOTAL LAPAROSCOPIC HYSTERECTOMY BILATERAL SALPINGO OOPHERECTOMY WITH DAVINCI SI ROBOT;  Surgeon: Rajendra Rico MD;  Location: [facility] OR;  endometriosis/fibroids   • TUBAL ABDOMINAL LIGATION           Social History:     Social History     Social History   • Marital status:      Spouse name: Heath Garsia   • Number of children: 2   • Years of education: high School [employer]      Occupational History   • Unemployed       Social History Main Topics   • Smoking status: Current Some Day Smoker     Packs/day: 0.50     Years: 10.00     Types: Cigarettes   • Smokeless tobacco: Never Used      Comment: I not around secondhand smoke in house or car unless visits certain family members   • Alcohol use Yes      Comment: occasional   • Drug use: No   • Sexual activity: Defer      Comment:       Other Topics Concern   • Not on file     Social History Narrative    Lives with  and children.  Unemployed due to not being able to find work due to hoarse voice.  Denied disability due to hoarseness not that severe.         Family History:     Family History   Problem Relation Age of  Onset   • No Known Problems Mother    • No Known Problems Father    • Obesity Sister    • Hypertension Sister        Review of Systems:     Review of Systems   Constitutional: Negative.  Negative for activity change, appetite change, chills, diaphoresis, fatigue and unexpected weight change.   HENT: Negative for congestion, dental problem, drooling, ear discharge, ear pain, facial swelling, hearing loss, mouth sores, nosebleeds, postnasal drip, rhinorrhea, sinus pressure, sneezing, sore throat, tinnitus, trouble swallowing and voice change.    Eyes: Negative.  Negative for photophobia, pain, discharge, redness, itching and visual disturbance.   Respiratory: Negative.  Negative for apnea, cough, choking, chest tightness, shortness of breath, wheezing and stridor.    Cardiovascular: Negative.  Negative for chest pain, palpitations and leg swelling.   Gastrointestinal: Negative.  Negative for abdominal distention, abdominal pain, anal bleeding, blood in stool, constipation, diarrhea, nausea, rectal pain and vomiting.   Endocrine: Negative.  Negative for cold intolerance, heat intolerance, polydipsia, polyphagia and polyuria.   Genitourinary: Positive for flank pain.   Musculoskeletal: Negative for arthralgias, back pain, gait problem, joint swelling, myalgias, neck pain and neck stiffness.   Skin: Negative.  Negative for color change, pallor, rash and wound.   Allergic/Immunologic: Negative.  Negative for environmental allergies, food allergies and immunocompromised state.   Neurological: Negative.  Negative for dizziness, tremors, seizures, syncope, facial asymmetry, speech difficulty, weakness, light-headedness, numbness and headaches.   Hematological: Negative.  Negative for adenopathy. Does not bruise/bleed easily.   Psychiatric/Behavioral: Negative for agitation, behavioral problems, confusion, decreased concentration, dysphoric mood, hallucinations, self-injury, sleep disturbance and suicidal ideas. The patient  is not nervous/anxious and is not hyperactive.    All other systems reviewed and are negative.      Physical Exam:     Physical Exam   Constitutional: She appears well-developed and well-nourished.   HENT:   Head: Normocephalic and atraumatic.   Right Ear: External ear normal.   Left Ear: External ear normal.   Mouth/Throat: Oropharynx is clear and moist.   Eyes: Pupils are equal, round, and reactive to light. Conjunctivae are normal.   Cardiovascular: Normal rate, regular rhythm, normal heart sounds and intact distal pulses.    Pulmonary/Chest: Effort normal and breath sounds normal.   Abdominal: Soft. Bowel sounds are normal. She exhibits no distension and no mass. There is no tenderness. There is no rebound and no guarding.   Genitourinary: No vaginal discharge found.   Musculoskeletal: Normal range of motion.   Neurological: She is alert. She has normal reflexes.   Skin: Skin is warm and dry.   Psychiatric: She has a normal mood and affect. Her behavior is normal. Judgment and thought content normal.       I have reviewed the following portions of the patient's history: allergies, current medications, past family history, past medical history, past social history, past surgical history, problem list and ROS and confirm it's accurate.      Procedure:       Assessment/Plan:   Renal calculus-we discussed the presence of the stone we discussed the various therapeutic options available including percutaneous nephrostolithotomy, lithotripsy.  We discussed the risks of lithotripsy including the passage of stones the development of a large string of stones in the distal ureter known as Steinstrasse.  In the 3% incidence of that we will need to proceed with a ureteroscopy for obstructing fragments.  Extremely rare incidence of renal hematoma.  And the significance of this.  We discussed the absolute relative indicators for intervention including the presence of sepsis, and pain we cannot control is the primary need for  urgent intervention.  We discussed placement of a stent if indicated and the management of the stent as well. ESWL-the patient is a candidate for extracorporeal shockwave lithotripsy.  We discussed the size type of stone.  And the complications associated with the procedure including but not limited to pain in the flank, hematoma, spontaneous renal hemorrhage, and adequate fragmentation of stones as well as the need for passage of the stone fragments. The need for concomitant additional procedures in the range of 24% and the risk of a distal fragment in the range of 3% requiring ureteroscopic removal..   Narcotic pain medication-patient has significant acute pain that I believe would be an indication for the use of narcotic pain medication.  I discussed the significant risks of pain medication and the fact that this will be a short only option and I will give her no more than a three-day supply of pain medication.  And I will not plan long-term medication and that this will be sent to a pain clinic if at all becomes necessary.  We discussed signing a pain medication agreement and the fact that we're going to run a state [facility] review to be sure the patient is not getting pain medication from elsewhere.  If this is the case we will not give pain medication.  As part of the patient's treatment plan of there being prescribed a controlled substance with potential for abuse.  This patient has been wait aware of the appropriate dose of such medications including, the risk for somnolence, limited ability to drive and/or safety and the significant potential for overdose.  It is been made clear that these medications are for the prescribed patient only without concomitant use of alcohol or other sepsis unless prescribed by the medical provider.  Has completed prescribing agreement detailing the terms of continue prescribing him a controlled substance.  Including monitoring Frankie ports, the possibility of urine drug screens  and pedal counts.  The patient is aware that we reviewed BAKARI reports on a regular basis and scan them into the chart.  History and physical examination exhibited continued safe and appropriate use of controlled substances.  Also discussed the fact that the new [place] legislation allows only a three-day prescription for pain medication.  In this situation he will be referred to a chronic pain clinic.       Patient's Body mass index is 34.97 kg/m². BMI is above normal parameters. Recommendations include: educational material.          This document has been electronically signed by BEN BAEZ MD October 8, 2018 2:26 PM

## 2018-10-10 ENCOUNTER — TELEPHONE (OUTPATIENT)
Dept: UROLOGY | Facility: CLINIC | Age: 38
End: 2018-10-10

## 2018-10-10 PROBLEM — N20.0 KIDNEY STONE: Status: ACTIVE | Noted: 2018-10-10

## 2018-10-10 NOTE — TELEPHONE ENCOUNTER
I called the patient with PAT and Surgery time. I also gave the patient detailed instructions for the night before and morning of procedure. Patient expressed understanding. Procedure having:  Right ESWL    Surgery: 10/19/18 @ 2:00 pm  PAT: 10/17/18 @ 8:00 am

## 2018-10-17 ENCOUNTER — APPOINTMENT (OUTPATIENT)
Dept: PREADMISSION TESTING | Facility: HOSPITAL | Age: 38
End: 2018-10-17

## 2018-10-17 LAB
ANION GAP SERPL CALCULATED.3IONS-SCNC: 4.1 MMOL/L (ref 3.6–11.2)
BUN BLD-MCNC: 15 MG/DL (ref 7–21)
BUN/CREAT SERPL: 18.3 (ref 7–25)
CALCIUM SPEC-SCNC: 8.9 MG/DL (ref 7.7–10)
CHLORIDE SERPL-SCNC: 109 MMOL/L (ref 99–112)
CO2 SERPL-SCNC: 30.9 MMOL/L (ref 24.3–31.9)
CREAT BLD-MCNC: 0.82 MG/DL (ref 0.43–1.29)
DEPRECATED RDW RBC AUTO: 46.4 FL (ref 37–54)
ERYTHROCYTE [DISTWIDTH] IN BLOOD BY AUTOMATED COUNT: 13.8 % (ref 11.5–14.5)
GFR SERPL CREATININE-BSD FRML MDRD: 78 ML/MIN/1.73
GLUCOSE BLD-MCNC: 76 MG/DL (ref 70–110)
HCT VFR BLD AUTO: 38.2 % (ref 37–47)
HGB BLD-MCNC: 12.2 G/DL (ref 12–16)
MCH RBC QN AUTO: 29.7 PG (ref 27–33)
MCHC RBC AUTO-ENTMCNC: 31.9 G/DL (ref 33–37)
MCV RBC AUTO: 92.9 FL (ref 80–94)
OSMOLALITY SERPL CALC.SUM OF ELEC: 286.4 MOSM/KG (ref 273–305)
PLATELET # BLD AUTO: 312 10*3/MM3 (ref 130–400)
PMV BLD AUTO: 10 FL (ref 6–10)
POTASSIUM BLD-SCNC: 3.7 MMOL/L (ref 3.5–5.3)
RBC # BLD AUTO: 4.11 10*6/MM3 (ref 4.2–5.4)
SODIUM BLD-SCNC: 144 MMOL/L (ref 135–153)
WBC NRBC COR # BLD: 7.91 10*3/MM3 (ref 4.5–12.5)

## 2018-10-17 PROCEDURE — 85027 COMPLETE CBC AUTOMATED: CPT | Performed by: ANESTHESIOLOGY

## 2018-10-17 PROCEDURE — 80048 BASIC METABOLIC PNL TOTAL CA: CPT | Performed by: ANESTHESIOLOGY

## 2018-10-17 PROCEDURE — 36415 COLL VENOUS BLD VENIPUNCTURE: CPT

## 2018-10-17 RX ORDER — LISINOPRIL AND HYDROCHLOROTHIAZIDE 12.5; 1 MG/1; MG/1
1 TABLET ORAL DAILY
COMMUNITY
End: 2019-12-26

## 2018-10-17 NOTE — DISCHARGE INSTRUCTIONS

## 2018-11-02 ENCOUNTER — ANESTHESIA (OUTPATIENT)
Dept: PERIOP | Facility: HOSPITAL | Age: 38
End: 2018-11-02

## 2018-11-02 ENCOUNTER — ANESTHESIA EVENT (OUTPATIENT)
Dept: PERIOP | Facility: HOSPITAL | Age: 38
End: 2018-11-02

## 2018-11-02 ENCOUNTER — HOSPITAL ENCOUNTER (OUTPATIENT)
Facility: HOSPITAL | Age: 38
Discharge: HOME | End: 2018-11-02
Attending: UROLOGY | Admitting: UROLOGY

## 2018-11-02 VITALS
HEART RATE: 80 BPM | HEIGHT: 61 IN | DIASTOLIC BLOOD PRESSURE: 86 MMHG | SYSTOLIC BLOOD PRESSURE: 126 MMHG | BODY MASS INDEX: 35.5 KG/M2 | WEIGHT: 188 LBS | RESPIRATION RATE: 16 BRPM | OXYGEN SATURATION: 98 % | TEMPERATURE: 98 F

## 2018-11-02 DIAGNOSIS — N20.0 KIDNEY STONE: ICD-10-CM

## 2018-11-02 PROCEDURE — 25010000002 MIDAZOLAM PER 1 MG: Performed by: NURSE ANESTHETIST, CERTIFIED REGISTERED

## 2018-11-02 PROCEDURE — 25010000002 PROPOFOL 10 MG/ML EMULSION: Performed by: NURSE ANESTHETIST, CERTIFIED REGISTERED

## 2018-11-02 PROCEDURE — 50590 FRAGMENTING OF KIDNEY STONE: CPT | Performed by: UROLOGY

## 2018-11-02 PROCEDURE — 25010000002 DEXAMETHASONE PER 1 MG: Performed by: NURSE ANESTHETIST, CERTIFIED REGISTERED

## 2018-11-02 PROCEDURE — 25010000002 FENTANYL CITRATE (PF) 100 MCG/2ML SOLUTION: Performed by: NURSE ANESTHETIST, CERTIFIED REGISTERED

## 2018-11-02 PROCEDURE — 25010000002 ONDANSETRON PER 1 MG: Performed by: NURSE ANESTHETIST, CERTIFIED REGISTERED

## 2018-11-02 PROCEDURE — 25010000002 GENTAMICIN PER 80 MG: Performed by: UROLOGY

## 2018-11-02 RX ORDER — DEXAMETHASONE SODIUM PHOSPHATE 4 MG/ML
INJECTION, SOLUTION INTRA-ARTICULAR; INTRALESIONAL; INTRAMUSCULAR; INTRAVENOUS; SOFT TISSUE AS NEEDED
Status: DISCONTINUED | OUTPATIENT
Start: 2018-11-02 | End: 2018-11-02 | Stop reason: SURG

## 2018-11-02 RX ORDER — ONDANSETRON 2 MG/ML
4 INJECTION INTRAMUSCULAR; INTRAVENOUS ONCE AS NEEDED
Status: DISCONTINUED | OUTPATIENT
Start: 2018-11-02 | End: 2018-11-02 | Stop reason: HOSPADM

## 2018-11-02 RX ORDER — PROPOFOL 10 MG/ML
VIAL (ML) INTRAVENOUS AS NEEDED
Status: DISCONTINUED | OUTPATIENT
Start: 2018-11-02 | End: 2018-11-02 | Stop reason: SURG

## 2018-11-02 RX ORDER — SODIUM CHLORIDE 0.9 % (FLUSH) 0.9 %
3-10 SYRINGE (ML) INJECTION AS NEEDED
Status: DISCONTINUED | OUTPATIENT
Start: 2018-11-02 | End: 2018-11-02 | Stop reason: HOSPADM

## 2018-11-02 RX ORDER — GENTAMICIN SULFATE 80 MG/50ML
80 INJECTION, SOLUTION INTRAVENOUS ONCE
Status: COMPLETED | OUTPATIENT
Start: 2018-11-02 | End: 2018-11-02

## 2018-11-02 RX ORDER — IPRATROPIUM BROMIDE AND ALBUTEROL SULFATE 2.5; .5 MG/3ML; MG/3ML
3 SOLUTION RESPIRATORY (INHALATION) ONCE AS NEEDED
Status: DISCONTINUED | OUTPATIENT
Start: 2018-11-02 | End: 2018-11-02 | Stop reason: HOSPADM

## 2018-11-02 RX ORDER — SODIUM CHLORIDE, SODIUM LACTATE, POTASSIUM CHLORIDE, CALCIUM CHLORIDE 600; 310; 30; 20 MG/100ML; MG/100ML; MG/100ML; MG/100ML
125 INJECTION, SOLUTION INTRAVENOUS CONTINUOUS
Status: DISCONTINUED | OUTPATIENT
Start: 2018-11-02 | End: 2018-11-02 | Stop reason: HOSPADM

## 2018-11-02 RX ORDER — OXYCODONE AND ACETAMINOPHEN 10; 325 MG/1; MG/1
1 TABLET ORAL EVERY 4 HOURS PRN
Qty: 15 TABLET | Refills: 0 | Status: ON HOLD | OUTPATIENT
Start: 2018-11-02 | End: 2018-12-14

## 2018-11-02 RX ORDER — LIDOCAINE HYDROCHLORIDE 20 MG/ML
INJECTION, SOLUTION EPIDURAL; INFILTRATION; INTRACAUDAL; PERINEURAL AS NEEDED
Status: DISCONTINUED | OUTPATIENT
Start: 2018-11-02 | End: 2018-11-02 | Stop reason: SURG

## 2018-11-02 RX ORDER — OXYCODONE HYDROCHLORIDE AND ACETAMINOPHEN 5; 325 MG/1; MG/1
1 TABLET ORAL ONCE AS NEEDED
Status: DISCONTINUED | OUTPATIENT
Start: 2018-11-02 | End: 2018-11-02 | Stop reason: HOSPADM

## 2018-11-02 RX ORDER — ONDANSETRON 2 MG/ML
INJECTION INTRAMUSCULAR; INTRAVENOUS AS NEEDED
Status: DISCONTINUED | OUTPATIENT
Start: 2018-11-02 | End: 2018-11-02 | Stop reason: SURG

## 2018-11-02 RX ORDER — MAGNESIUM HYDROXIDE 1200 MG/15ML
LIQUID ORAL AS NEEDED
Status: DISCONTINUED | OUTPATIENT
Start: 2018-11-02 | End: 2018-11-02 | Stop reason: HOSPADM

## 2018-11-02 RX ORDER — FENTANYL CITRATE 50 UG/ML
50 INJECTION, SOLUTION INTRAMUSCULAR; INTRAVENOUS
Status: DISCONTINUED | OUTPATIENT
Start: 2018-11-02 | End: 2018-11-02 | Stop reason: HOSPADM

## 2018-11-02 RX ORDER — MIDAZOLAM HYDROCHLORIDE 1 MG/ML
INJECTION INTRAMUSCULAR; INTRAVENOUS AS NEEDED
Status: DISCONTINUED | OUTPATIENT
Start: 2018-11-02 | End: 2018-11-02 | Stop reason: SURG

## 2018-11-02 RX ORDER — FENTANYL CITRATE 50 UG/ML
INJECTION, SOLUTION INTRAMUSCULAR; INTRAVENOUS AS NEEDED
Status: DISCONTINUED | OUTPATIENT
Start: 2018-11-02 | End: 2018-11-02 | Stop reason: SURG

## 2018-11-02 RX ORDER — MEPERIDINE HYDROCHLORIDE 25 MG/ML
12.5 INJECTION INTRAMUSCULAR; INTRAVENOUS; SUBCUTANEOUS
Status: DISCONTINUED | OUTPATIENT
Start: 2018-11-02 | End: 2018-11-02 | Stop reason: HOSPADM

## 2018-11-02 RX ORDER — SODIUM CHLORIDE 0.9 % (FLUSH) 0.9 %
3 SYRINGE (ML) INJECTION EVERY 12 HOURS SCHEDULED
Status: DISCONTINUED | OUTPATIENT
Start: 2018-11-02 | End: 2018-11-02 | Stop reason: HOSPADM

## 2018-11-02 RX ADMIN — SODIUM CHLORIDE, POTASSIUM CHLORIDE, SODIUM LACTATE AND CALCIUM CHLORIDE: 600; 310; 30; 20 INJECTION, SOLUTION INTRAVENOUS at 14:37

## 2018-11-02 RX ADMIN — GENTAMICIN SULFATE 80 MG: 80 INJECTION, SOLUTION INTRAVENOUS at 15:05

## 2018-11-02 RX ADMIN — FENTANYL CITRATE 50 MCG: 50 INJECTION INTRAMUSCULAR; INTRAVENOUS at 15:49

## 2018-11-02 RX ADMIN — MIDAZOLAM HYDROCHLORIDE 2 MG: 1 INJECTION, SOLUTION INTRAMUSCULAR; INTRAVENOUS at 15:05

## 2018-11-02 RX ADMIN — FENTANYL CITRATE 50 MCG: 50 INJECTION INTRAMUSCULAR; INTRAVENOUS at 15:25

## 2018-11-02 RX ADMIN — LIDOCAINE HYDROCHLORIDE 3 ML: 20 INJECTION, SOLUTION EPIDURAL; INFILTRATION; INTRACAUDAL; PERINEURAL at 15:06

## 2018-11-02 RX ADMIN — FENTANYL CITRATE 50 MCG: 50 INJECTION INTRAMUSCULAR; INTRAVENOUS at 15:05

## 2018-11-02 RX ADMIN — DEXAMETHASONE SODIUM PHOSPHATE 8 MG: 4 INJECTION, SOLUTION INTRAMUSCULAR; INTRAVENOUS at 15:11

## 2018-11-02 RX ADMIN — FENTANYL CITRATE 50 MCG: 50 INJECTION INTRAMUSCULAR; INTRAVENOUS at 15:54

## 2018-11-02 RX ADMIN — PROPOFOL 200 MG: 10 INJECTION, EMULSION INTRAVENOUS at 15:07

## 2018-11-02 RX ADMIN — ONDANSETRON 4 MG: 2 INJECTION, SOLUTION INTRAMUSCULAR; INTRAVENOUS at 15:11

## 2018-11-02 NOTE — ANESTHESIA PROCEDURE NOTES
Airway  Urgency: elective    Date/Time: 11/2/2018 3:07 PM  End Time:11/2/2018 3:07 PM    General Information and Staff    Patient location during procedure: OR  CRNA: JONN ACOSTA    Indications and Patient Condition  Indications for airway management: airway protection    Preoxygenated: yes  MILS maintained throughout  Mask difficulty assessment: 0 - not attempted    Final Airway Details  Final airway type: supraglottic airway      Successful airway: unique  Size 3  Airway Seal Pressure (cm H2O): 20    Number of attempts at approach: 1    Additional Comments  Atraumatic

## 2018-11-02 NOTE — H&P (VIEW-ONLY)
Chief Complaint:          Chief Complaint   Patient presents with   • Nephrolithiasis       HPI:   38 y.o. female.    38-year-old white female with a  left midpole renal stone pole calculus about 8 mm presents with severe pain.  She had a CT and KUB she was voiding blood last time she had 3 on the  the left.  She is allergic to ibuprofen she has no allergies she's had 2 C-sections.  She has high blood pressure she's had previous lithotripsy and cystoscopy.  Past Medical History:        Past Medical History:   Diagnosis Date   • Anxiety    • Chronic laryngitis    • Fatigue    • GERD (gastroesophageal reflux disease)     full history in HPI.  SEVERE, on H2 blocker only due ot insurance not covering PPI.  Loses employment opportunities due to hoarseness from reflux.    • Hyperlipidemia    • Hypertension    • Kidney stones    • Migraine    • PONV (postoperative nausea and vomiting)    • Prediabetes    • Sleep apnea     does not use CPAP due to intolerance   • Spinal headache          Current Meds:     Current Outpatient Prescriptions   Medication Sig Dispense Refill   • amoxicillin-clavulanate (AUGMENTIN) 875-125 MG per tablet Take 1 tablet by mouth 2 (Two) Times a Day for 7 days. 14 tablet 0   • budesonide-formoterol (SYMBICORT) 160-4.5 MCG/ACT inhaler Inhale 2 puffs Every 12 (Twelve) Hours.     • ibuprofen (ADVIL,MOTRIN) 800 MG tablet Take 1 tablet by mouth Every 6 (Six) Hours As Needed for Mild Pain . 25 tablet 0   • lisinopril-hydrochlorothiazide (PRINZIDE,ZESTORETIC) 20-12.5 MG per tablet 1 tablet Daily.       No current facility-administered medications for this visit.         Allergies:      No Known Allergies     Past Surgical History:     Past Surgical History:   Procedure Laterality Date   • ABDOMINAL SURGERY     • BRAVO PROCEDURE N/A 5/8/2017    Procedure: ESOPHAGOGASTRODUODENOSCOPY AND NAILS;  Surgeon: Morris Solares MD;  Location: [facility];  Service:    • CARDIOVASCULAR STRESS TEST  09/08/2017   •   SECTION PRIMARY      done under general anesthesia b/c couldn't get epidural   •  SECTION WITH TUBAL     • DIAGNOSTIC LAPAROSCOPY      looking for endometriosis   • DILATATION AND CURETTAGE N/A 10/4/2016    Procedure: DILATATION AND CURETTAGE WITH FROZEN SECTION;  Surgeon: Rajendra Rico MD;  Location:  [facility] OR;  for abnormal uterine bleeding   • ECHO - CONVERTED  2017   • ENDOMETRIAL FULGURATION     • ENDOSCOPY  2017    in Dr. Beck Gongora   • HIATAL HERNIA REPAIR N/A 2018    Procedure: HIATAL HERNIA REPAIR LAPAROSCOPIC;  Surgeon: Morris Solares MD;  Location: [facility] OR;  Service:    • NISSEN FUNDOPLICATION N/A 2018    Procedure: NISSEN FUNDOPLICATION LAPAROSCOPIC ;  Surgeon: Morris Solares MD;  Location: [facility] OR;  Service:    • TOTAL LAPAROSCOPIC HYSTERECTOMY N/A 10/4/2016    Procedure: TOTAL LAPAROSCOPIC HYSTERECTOMY BILATERAL SALPINGO OOPHERECTOMY WITH DAVINCI SI ROBOT;  Surgeon: Rajendra Rico MD;  Location: [facility] OR;  endometriosis/fibroids   • TUBAL ABDOMINAL LIGATION           Social History:     Social History     Social History   • Marital status:      Spouse name: Heath Garsia   • Number of children: 2   • Years of education: high School [employer]      Occupational History   • Unemployed       Social History Main Topics   • Smoking status: Current Some Day Smoker     Packs/day: 0.50     Years: 10.00     Types: Cigarettes   • Smokeless tobacco: Never Used      Comment: I not around secondhand smoke in house or car unless visits certain family members   • Alcohol use Yes      Comment: occasional   • Drug use: No   • Sexual activity: Defer      Comment:       Other Topics Concern   • Not on file     Social History Narrative    Lives with  and children.  Unemployed due to not being able to find work due to hoarse voice.  Denied disability due to hoarseness not that severe.         Family History:     Family History   Problem Relation Age of  Onset   • No Known Problems Mother    • No Known Problems Father    • Obesity Sister    • Hypertension Sister        Review of Systems:     Review of Systems   Constitutional: Negative.  Negative for activity change, appetite change, chills, diaphoresis, fatigue and unexpected weight change.   HENT: Negative for congestion, dental problem, drooling, ear discharge, ear pain, facial swelling, hearing loss, mouth sores, nosebleeds, postnasal drip, rhinorrhea, sinus pressure, sneezing, sore throat, tinnitus, trouble swallowing and voice change.    Eyes: Negative.  Negative for photophobia, pain, discharge, redness, itching and visual disturbance.   Respiratory: Negative.  Negative for apnea, cough, choking, chest tightness, shortness of breath, wheezing and stridor.    Cardiovascular: Negative.  Negative for chest pain, palpitations and leg swelling.   Gastrointestinal: Negative.  Negative for abdominal distention, abdominal pain, anal bleeding, blood in stool, constipation, diarrhea, nausea, rectal pain and vomiting.   Endocrine: Negative.  Negative for cold intolerance, heat intolerance, polydipsia, polyphagia and polyuria.   Genitourinary: Positive for flank pain.   Musculoskeletal: Negative for arthralgias, back pain, gait problem, joint swelling, myalgias, neck pain and neck stiffness.   Skin: Negative.  Negative for color change, pallor, rash and wound.   Allergic/Immunologic: Negative.  Negative for environmental allergies, food allergies and immunocompromised state.   Neurological: Negative.  Negative for dizziness, tremors, seizures, syncope, facial asymmetry, speech difficulty, weakness, light-headedness, numbness and headaches.   Hematological: Negative.  Negative for adenopathy. Does not bruise/bleed easily.   Psychiatric/Behavioral: Negative for agitation, behavioral problems, confusion, decreased concentration, dysphoric mood, hallucinations, self-injury, sleep disturbance and suicidal ideas. The patient  is not nervous/anxious and is not hyperactive.    All other systems reviewed and are negative.      Physical Exam:     Physical Exam   Constitutional: She appears well-developed and well-nourished.   HENT:   Head: Normocephalic and atraumatic.   Right Ear: External ear normal.   Left Ear: External ear normal.   Mouth/Throat: Oropharynx is clear and moist.   Eyes: Pupils are equal, round, and reactive to light. Conjunctivae are normal.   Cardiovascular: Normal rate, regular rhythm, normal heart sounds and intact distal pulses.    Pulmonary/Chest: Effort normal and breath sounds normal.   Abdominal: Soft. Bowel sounds are normal. She exhibits no distension and no mass. There is no tenderness. There is no rebound and no guarding.   Genitourinary: No vaginal discharge found.   Musculoskeletal: Normal range of motion.   Neurological: She is alert. She has normal reflexes.   Skin: Skin is warm and dry.   Psychiatric: She has a normal mood and affect. Her behavior is normal. Judgment and thought content normal.       I have reviewed the following portions of the patient's history: allergies, current medications, past family history, past medical history, past social history, past surgical history, problem list and ROS and confirm it's accurate.      Procedure:       Assessment/Plan:   Renal calculus-we discussed the presence of the stone we discussed the various therapeutic options available including percutaneous nephrostolithotomy, lithotripsy.  We discussed the risks of lithotripsy including the passage of stones the development of a large string of stones in the distal ureter known as Steinstrasse.  In the 3% incidence of that we will need to proceed with a ureteroscopy for obstructing fragments.  Extremely rare incidence of renal hematoma.  And the significance of this.  We discussed the absolute relative indicators for intervention including the presence of sepsis, and pain we cannot control is the primary need for  urgent intervention.  We discussed placement of a stent if indicated and the management of the stent as well. ESWL-the patient is a candidate for extracorporeal shockwave lithotripsy.  We discussed the size type of stone.  And the complications associated with the procedure including but not limited to pain in the flank, hematoma, spontaneous renal hemorrhage, and adequate fragmentation of stones as well as the need for passage of the stone fragments. The need for concomitant additional procedures in the range of 24% and the risk of a distal fragment in the range of 3% requiring ureteroscopic removal..   Narcotic pain medication-patient has significant acute pain that I believe would be an indication for the use of narcotic pain medication.  I discussed the significant risks of pain medication and the fact that this will be a short only option and I will give her no more than a three-day supply of pain medication.  And I will not plan long-term medication and that this will be sent to a pain clinic if at all becomes necessary.  We discussed signing a pain medication agreement and the fact that we're going to run a state [facility] review to be sure the patient is not getting pain medication from elsewhere.  If this is the case we will not give pain medication.  As part of the patient's treatment plan of there being prescribed a controlled substance with potential for abuse.  This patient has been wait aware of the appropriate dose of such medications including, the risk for somnolence, limited ability to drive and/or safety and the significant potential for overdose.  It is been made clear that these medications are for the prescribed patient only without concomitant use of alcohol or other sepsis unless prescribed by the medical provider.  Has completed prescribing agreement detailing the terms of continue prescribing him a controlled substance.  Including monitoring Frankie ports, the possibility of urine drug screens  and pedal counts.  The patient is aware that we reviewed BAKARI reports on a regular basis and scan them into the chart.  History and physical examination exhibited continued safe and appropriate use of controlled substances.  Also discussed the fact that the new [place] legislation allows only a three-day prescription for pain medication.  In this situation he will be referred to a chronic pain clinic.       Patient's Body mass index is 34.97 kg/m². BMI is above normal parameters. Recommendations include: educational material.          This document has been electronically signed by BEN BAEZ MD October 8, 2018 2:26 PM

## 2018-11-02 NOTE — ANESTHESIA PREPROCEDURE EVALUATION
Anesthesia Evaluation     Patient summary reviewed and Nursing notes reviewed   history of anesthetic complications: PONV  NPO Solid Status: > 8 hours  NPO Liquid Status: > 8 hours           Airway   Mallampati: II  TM distance: >3 FB  Neck ROM: full  no difficulty expected  Dental - normal exam     Comment: Small chip upper R incisor    Pulmonary - normal exam   (+) a smoker Current Abstained day of surgery, sleep apnea (mild),   (-) asthma, shortness of breath  Cardiovascular - normal exam  Exercise tolerance: good (4-7 METS)    NYHA Classification: II    (+) hypertension, hyperlipidemia,       Neuro/Psych  (+) headaches, psychiatric history Anxiety and Depression,     GI/Hepatic/Renal/Endo    (+) obesity,  hiatal hernia, GERD,  renal disease stones,   (-) morbid obesity, liver disease, diabetes, hypothyroidism    Musculoskeletal     (+) back pain,   Abdominal  - normal exam    Bowel sounds: normal.   Substance History   (+) alcohol use,      OB/GYN negative ob/gyn ROS         Other - negative ROS                         Anesthesia Plan    ASA 3     general     intravenous induction   Anesthetic plan, all risks, benefits, and alternatives have been provided, discussed and informed consent has been obtained with: patient.  Use of blood products discussed with patient  Consented to blood products.

## 2018-11-02 NOTE — OP NOTE
EXTRACORPOREAL SHOCKWAVE LITHOTRIPSY, CYSTOSCOPY  Procedure Note    Yahir Garsia  11/2/2018    Pre-op Diagnosis:   Kidney stone [N20.0]    Post-op Diagnosis:     Post-Op Diagnosis Codes:     * Kidney stone [N20.0]    Procedure/CPT® Codes:   38-year-old white female with a history of gross hematuria here for lithotripsy for an 8 mm left renal pelvic stone as well as a cystoscopy to complete the workup.  After prep and drape in a sterile fashion shearing legs were frog leg.  The urethra was normal bladder was unremarkable there were no stones tumors or foreign bodies she tolerated it well.  ESWL-the patient is a candidate for extracorporeal shockwave lithotripsy.  We discussed the size type of stone.  And the complications associated with the procedure including but not limited to pain in the flank, hematoma, spontaneous renal hemorrhage, and adequate fragmentation of stones as well as the need for passage of the stone fragments. The need for concomitant additional procedures in the range of 24% and the risk of a distal fragment in the range of 3% requiring ureteroscopic removal..  Fact that sometimes a stent is indicated based on the size and the density of the stone as determined on the CAT scan.  Following an extensive informed consent he is brought to the operative suite and underwent induction of general endotracheal anesthetic the stone was localized at F2 and a total of 3000 shockwaves administered without complication.  There was excellent fragmentation and the patient was awake and alert return to recovery room.          Procedure(s):  EXTRACORPOREAL SHOCKWAVE LITHOTRIPSY  CYSTOSCOPY    Surgeon(s):  Ousmane Alvarez MD    Anesthesia: see anesthesia record    Staff:   Circulator: Cassidy Villavicencio RN  Scrub Person: Lynda Waters Kathy    Estimated Blood Loss: none  Urine Voided: * No values recorded between 11/2/2018  3:05 PM and 11/2/2018  3:20 PM *    Specimens:                 None      Drains: None    Findings: Normal bladder and good fragmentation    Blood: N/A    Complications: None    Grafts and Implants: None    Ousmane Alvarez MD     Date: 11/2/2018  Time: 3:20 PM

## 2018-11-03 NOTE — ANESTHESIA POSTPROCEDURE EVALUATION
Patient: Yahir Garsia    Procedure Summary     Date:  11/02/18 Room / Location:  [facility] OR 08 /  [facility]    Anesthesia Start:  1505 Anesthesia Stop:  1528    Procedures:       EXTRACORPOREAL SHOCKWAVE LITHOTRIPSY (Left )      CYSTOSCOPY (N/A Urethra) Diagnosis:       Kidney stone      (Kidney stone [N20.0])    Surgeon:  Ousmane Alvarez MD Provider:  Brijesh Lechuga MD    Anesthesia Type:  general ASA Status:  3          Anesthesia Type: general  Last vitals  BP   126/86 (11/02/18 1628)   Temp   98 °F (36.7 °C) (11/02/18 1607)   Pulse   80 (11/02/18 1628)   Resp   16 (11/02/18 1628)     SpO2   98 % (11/02/18 1628)     Post Anesthesia Care and Evaluation    Patient location during evaluation: PHASE II  Patient participation: complete - patient participated  Level of consciousness: awake and alert  Pain score: 1  Pain management: adequate  Airway patency: patent  Anesthetic complications: No anesthetic complications  PONV Status: controlled  Cardiovascular status: acceptable  Respiratory status: acceptable  Hydration status: acceptable

## 2018-11-19 ENCOUNTER — HOSPITAL ENCOUNTER (OUTPATIENT)
Dept: GENERAL RADIOLOGY | Facility: HOSPITAL | Age: 38
Discharge: HOME | End: 2018-11-19
Attending: UROLOGY | Admitting: UROLOGY

## 2018-11-19 ENCOUNTER — OFFICE VISIT (OUTPATIENT)
Dept: UROLOGY | Facility: CLINIC | Age: 38
End: 2018-11-19

## 2018-11-19 VITALS — WEIGHT: 188 LBS | HEIGHT: 61 IN | BODY MASS INDEX: 35.5 KG/M2

## 2018-11-19 DIAGNOSIS — N20.0 KIDNEY STONE: Primary | ICD-10-CM

## 2018-11-19 DIAGNOSIS — N20.0 KIDNEY STONE: ICD-10-CM

## 2018-11-19 PROCEDURE — 99024 POSTOP FOLLOW-UP VISIT: CPT | Performed by: UROLOGY

## 2018-11-19 PROCEDURE — 74018 RADEX ABDOMEN 1 VIEW: CPT | Performed by: RADIOLOGY

## 2018-11-19 PROCEDURE — 74018 RADEX ABDOMEN 1 VIEW: CPT

## 2018-11-19 NOTE — PROGRESS NOTES
Chief Complaint:          Chief Complaint   Patient presents with   • Surgery Follow Up       HPI:   38 y.o. female.  38-year-old white female with a  left midpole renal stone pole calculus about 8 mm presents with severe pain.  She had a CT and KUB she was voiding blood last time she had 3 on the  the left.  She is allergic to ibuprofen she has no allergies she's had 2 C-sections.  She has high blood pressure she's had previous lithotripsy and cystoscopy.  Is today.  She's doing much better.  Still passing some stone material.  There's been no fevers, chills, nausea, vomiting or significant dysuria.  I obtained a KUB today and after personal review and indicated she was stone free I gave her reassurance that we had a discussion of avoidance techniques for further stones    Past Medical History:        Past Medical History:   Diagnosis Date   • Anxiety    • Chronic laryngitis     HISTORY OF   • Depression    • Fatigue    • GERD (gastroesophageal reflux disease)     full history in HPI.  SEVERE, on H2 blocker only due ot insurance not covering PPI.  Loses employment opportunities due to hoarseness from reflux.    • Hyperlipidemia    • Hypertension    • Kidney stones    • Migraine    • Prediabetes     HISTORY OF   • Sleep apnea     does not use CPAP due to intolerance   • Spinal headache          Current Meds:     Current Outpatient Medications   Medication Sig Dispense Refill   • budesonide-formoterol (SYMBICORT) 160-4.5 MCG/ACT inhaler Inhale 2 puffs Every 12 (Twelve) Hours.     • HYDROcodone-acetaminophen (NORCO) 5-325 MG per tablet 1 to 2 Tablets Every 6 Hours as Needed for PAIN 20 tablet 0   • ibuprofen (ADVIL,MOTRIN) 800 MG tablet Take 1 tablet by mouth Every 6 (Six) Hours As Needed for Mild Pain . 25 tablet 0   • lisinopril-hydrochlorothiazide (PRINZIDE,ZESTORETIC) 10-12.5 MG per tablet Take 1 tablet by mouth Daily.     • lisinopril-hydrochlorothiazide (PRINZIDE,ZESTORETIC) 20-12.5 MG per tablet 1 tablet Daily.      • oxyCODONE-acetaminophen (PERCOCET)  MG per tablet Take 1 tablet by mouth Every 4 (Four) Hours As Needed for Moderate Pain. 15 tablet 0     No current facility-administered medications for this visit.         Allergies:      No Known Allergies     Past Surgical History:     Past Surgical History:   Procedure Laterality Date   • ABDOMINAL SURGERY     • CARDIOVASCULAR STRESS TEST  2017   •  SECTION PRIMARY      done under general anesthesia b/c couldn't get epidural   •  SECTION WITH TUBAL     • DIAGNOSTIC LAPAROSCOPY      looking for endometriosis   • ECHO - CONVERTED  2017   • ENDOMETRIAL FULGURATION     • ENDOSCOPY  2017    in Dr. Beck Gongora   • TUBAL ABDOMINAL LIGATION           Social History:     Social History     Socioeconomic History   • Marital status:      Spouse name: Heaht Garsia   • Number of children: 2   • Years of education: high School [employer]    • Highest education level: Not on file   Social Needs   • Financial resource strain: Not on file   • Food insecurity - worry: Not on file   • Food insecurity - inability: Not on file   • Transportation needs - medical: Not on file   • Transportation needs - non-medical: Not on file   Occupational History   • Occupation: Unemployed    Tobacco Use   • Smoking status: Current Some Day Smoker     Packs/day: 0.50     Years: 12.00     Pack years: 6.00     Types: Cigarettes   • Smokeless tobacco: Never Used   • Tobacco comment: I not around secondhand smoke in house or car unless visits certain family members   Substance and Sexual Activity   • Alcohol use: Yes     Comment: occasional   • Drug use: No   • Sexual activity: Defer     Partners: Male     Birth control/protection: Surgical     Comment:     Other Topics Concern   • Not on file   Social History Narrative    Lives with  and children.  Unemployed due to not being able to find work due to hoarse voice.  Denied disability due to hoarseness not  that severe.         Family History:     Family History   Problem Relation Age of Onset   • No Known Problems Mother    • No Known Problems Father    • Obesity Sister    • Hypertension Sister        Review of Systems:     Review of Systems   Constitutional: Negative.  Negative for activity change, appetite change, chills, diaphoresis, fatigue and unexpected weight change.   HENT: Negative for congestion, dental problem, drooling, ear discharge, ear pain, facial swelling, hearing loss, mouth sores, nosebleeds, postnasal drip, rhinorrhea, sinus pressure, sneezing, sore throat, tinnitus, trouble swallowing and voice change.    Eyes: Negative.  Negative for photophobia, pain, discharge, redness, itching and visual disturbance.   Respiratory: Negative.  Negative for apnea, cough, choking, chest tightness, shortness of breath, wheezing and stridor.    Cardiovascular: Negative.  Negative for chest pain, palpitations and leg swelling.   Gastrointestinal: Negative.  Negative for abdominal distention, abdominal pain, anal bleeding, blood in stool, constipation, diarrhea, nausea, rectal pain and vomiting.   Endocrine: Negative.  Negative for cold intolerance, heat intolerance, polydipsia, polyphagia and polyuria.   Musculoskeletal: Negative.  Negative for arthralgias, back pain, gait problem, joint swelling, myalgias, neck pain and neck stiffness.   Skin: Negative.  Negative for color change, pallor, rash and wound.   Allergic/Immunologic: Negative.  Negative for environmental allergies, food allergies and immunocompromised state.   Neurological: Negative.  Negative for dizziness, tremors, seizures, syncope, facial asymmetry, speech difficulty, weakness, light-headedness, numbness and headaches.   Hematological: Negative.  Negative for adenopathy. Does not bruise/bleed easily.   Psychiatric/Behavioral: Negative for agitation, behavioral problems, confusion, decreased concentration, dysphoric mood, hallucinations, self-injury,  sleep disturbance and suicidal ideas. The patient is not nervous/anxious and is not hyperactive.    All other systems reviewed and are negative.      Physical Exam:     Physical Exam   Constitutional: She appears well-developed and well-nourished.   HENT:   Head: Normocephalic and atraumatic.   Right Ear: External ear normal.   Left Ear: External ear normal.   Mouth/Throat: Oropharynx is clear and moist.   Eyes: Conjunctivae are normal. Pupils are equal, round, and reactive to light.   Cardiovascular: Normal rate, regular rhythm, normal heart sounds and intact distal pulses.   Pulmonary/Chest: Effort normal and breath sounds normal.   Abdominal: Soft. Bowel sounds are normal. She exhibits no distension and no mass. There is no tenderness. There is no rebound and no guarding.   Genitourinary: No vaginal discharge found.   Musculoskeletal: Normal range of motion.   Neurological: She is alert. She has normal reflexes.   Skin: Skin is warm and dry.   Psychiatric: She has a normal mood and affect. Her behavior is normal. Judgment and thought content normal.       I have reviewed the following portions of the patient's history: allergies, current medications, past family history, past medical history, past social history, past surgical history, problem list and ROS and confirm it's accurate.      Procedure:       Assessment/Plan:   Renal calculus-we discussed the presence of the stone we discussed the various therapeutic options available including percutaneous nephrostolithotomy, lithotripsy.  We discussed the risks of lithotripsy including the passage of stones the development of a large string of stones in the distal ureter known as Steinstrasse.  In the 3% incidence of that we will need to proceed with a ureteroscopy for obstructing fragments.  Extremely rare incidence of renal hematoma.  And the significance of this.  We discussed the absolute relative indicators for intervention including the presence of sepsis, and  pain we cannot control is the primary need for urgent intervention.  We discussed placement of a stent if indicated and the management of the stent as well.  She is status post a successful lithotripsy and is now stone free     Patient's Body mass index is 35.54 kg/m². BMI is above normal parameters. Recommendations include: educational material.          This document has been electronically signed by BEN BAEZ MD November 19, 2018 2:31 PM

## 2018-12-13 ENCOUNTER — TRANSCRIBE ORDERS (OUTPATIENT)
Dept: ADMINISTRATIVE | Facility: HOSPITAL | Age: 38
End: 2018-12-13

## 2018-12-13 ENCOUNTER — HOSPITAL ENCOUNTER (OUTPATIENT)
Dept: GENERAL RADIOLOGY | Facility: HOSPITAL | Age: 38
Discharge: HOME | End: 2018-12-13
Admitting: NURSE PRACTITIONER

## 2018-12-13 DIAGNOSIS — R10.84 ABDOMINAL PAIN, GENERALIZED: Primary | ICD-10-CM

## 2018-12-13 DIAGNOSIS — R10.84 ABDOMINAL PAIN, GENERALIZED: ICD-10-CM

## 2018-12-13 PROCEDURE — 74022 RADEX COMPL AQT ABD SERIES: CPT | Performed by: RADIOLOGY

## 2018-12-13 PROCEDURE — 74022 RADEX COMPL AQT ABD SERIES: CPT

## 2018-12-14 ENCOUNTER — APPOINTMENT (OUTPATIENT)
Dept: CT IMAGING | Facility: HOSPITAL | Age: 38
End: 2018-12-14

## 2018-12-14 ENCOUNTER — TRANSCRIBE ORDERS (OUTPATIENT)
Dept: ADMINISTRATIVE | Facility: HOSPITAL | Age: 38
End: 2018-12-14

## 2018-12-14 ENCOUNTER — ANESTHESIA EVENT (OUTPATIENT)
Dept: PERIOP | Facility: HOSPITAL | Age: 38
End: 2018-12-14

## 2018-12-14 ENCOUNTER — HOSPITAL ENCOUNTER (INPATIENT)
Facility: HOSPITAL | Age: 38
LOS: 2 days | Discharge: HOME | End: 2018-12-16
Attending: GENERAL ACUTE CARE HOSPITAL | Admitting: SURGERY

## 2018-12-14 ENCOUNTER — ANESTHESIA (OUTPATIENT)
Dept: PERIOP | Facility: HOSPITAL | Age: 38
End: 2018-12-14

## 2018-12-14 DIAGNOSIS — K35.30 ACUTE APPENDICITIS WITH LOCALIZED PERITONITIS, WITHOUT PERFORATION, ABSCESS, OR GANGRENE: ICD-10-CM

## 2018-12-14 DIAGNOSIS — R10.84 ABDOMINAL PAIN, GENERALIZED: Primary | ICD-10-CM

## 2018-12-14 DIAGNOSIS — K37 APPENDICITIS, UNSPECIFIED APPENDICITIS TYPE: Primary | ICD-10-CM

## 2018-12-14 LAB
ALBUMIN SERPL-MCNC: 4.6 G/DL (ref 3.5–5)
ALBUMIN/GLOB SERPL: 1.6 G/DL (ref 1.5–2.5)
ALP SERPL-CCNC: 106 U/L (ref 35–104)
ALT SERPL W P-5'-P-CCNC: 15 U/L (ref 10–36)
ANION GAP SERPL CALCULATED.3IONS-SCNC: 4.7 MMOL/L (ref 3.6–11.2)
AST SERPL-CCNC: 15 U/L (ref 10–30)
BASOPHILS # BLD AUTO: 0.02 10*3/MM3 (ref 0–0.3)
BASOPHILS NFR BLD AUTO: 0.1 % (ref 0–2)
BILIRUB SERPL-MCNC: 1.2 MG/DL (ref 0.2–1.8)
BILIRUB UR QL STRIP: NEGATIVE
BUN BLD-MCNC: 13 MG/DL (ref 7–21)
BUN/CREAT SERPL: 15.5 (ref 7–25)
CALCIUM SPEC-SCNC: 9.4 MG/DL (ref 7.7–10)
CHLORIDE SERPL-SCNC: 108 MMOL/L (ref 99–112)
CLARITY UR: ABNORMAL
CO2 SERPL-SCNC: 28.3 MMOL/L (ref 24.3–31.9)
COLOR UR: ABNORMAL
CREAT BLD-MCNC: 0.84 MG/DL (ref 0.43–1.29)
DEPRECATED RDW RBC AUTO: 45.1 FL (ref 37–54)
EOSINOPHIL # BLD AUTO: 0.04 10*3/MM3 (ref 0–0.7)
EOSINOPHIL NFR BLD AUTO: 0.2 % (ref 0–5)
ERYTHROCYTE [DISTWIDTH] IN BLOOD BY AUTOMATED COUNT: 13.6 % (ref 11.5–14.5)
GFR SERPL CREATININE-BSD FRML MDRD: 76 ML/MIN/1.73
GLOBULIN UR ELPH-MCNC: 2.9 GM/DL
GLUCOSE BLD-MCNC: 117 MG/DL (ref 70–110)
GLUCOSE UR STRIP-MCNC: NEGATIVE MG/DL
HCT VFR BLD AUTO: 41.4 % (ref 37–47)
HGB BLD-MCNC: 13.2 G/DL (ref 12–16)
HGB UR QL STRIP.AUTO: NEGATIVE
HOLD SPECIMEN: NORMAL
HOLD SPECIMEN: NORMAL
IMM GRANULOCYTES # BLD: 0.04 10*3/MM3 (ref 0–0.03)
IMM GRANULOCYTES NFR BLD: 0.2 % (ref 0–0.5)
KETONES UR QL STRIP: ABNORMAL
LEUKOCYTE ESTERASE UR QL STRIP.AUTO: NEGATIVE
LIPASE SERPL-CCNC: 26 U/L (ref 13–60)
LYMPHOCYTES # BLD AUTO: 1.78 10*3/MM3 (ref 1–3)
LYMPHOCYTES NFR BLD AUTO: 10.7 % (ref 21–51)
MCH RBC QN AUTO: 30.3 PG (ref 27–33)
MCHC RBC AUTO-ENTMCNC: 31.9 G/DL (ref 33–37)
MCV RBC AUTO: 95 FL (ref 80–94)
MONOCYTES # BLD AUTO: 0.88 10*3/MM3 (ref 0.1–0.9)
MONOCYTES NFR BLD AUTO: 5.3 % (ref 0–10)
NEUTROPHILS # BLD AUTO: 13.86 10*3/MM3 (ref 1.4–6.5)
NEUTROPHILS NFR BLD AUTO: 83.5 % (ref 30–70)
NITRITE UR QL STRIP: NEGATIVE
OSMOLALITY SERPL CALC.SUM OF ELEC: 282.4 MOSM/KG (ref 273–305)
PH UR STRIP.AUTO: <=5 [PH] (ref 5–8)
PLATELET # BLD AUTO: 334 10*3/MM3 (ref 130–400)
PMV BLD AUTO: 9.7 FL (ref 6–10)
POTASSIUM BLD-SCNC: 4.1 MMOL/L (ref 3.5–5.3)
PROT SERPL-MCNC: 7.5 G/DL (ref 6–8)
PROT UR QL STRIP: NEGATIVE
RBC # BLD AUTO: 4.36 10*6/MM3 (ref 4.2–5.4)
SODIUM BLD-SCNC: 141 MMOL/L (ref 135–153)
SP GR UR STRIP: 1.02 (ref 1–1.03)
UROBILINOGEN UR QL STRIP: ABNORMAL
WBC NRBC COR # BLD: 16.62 10*3/MM3 (ref 4.5–12.5)
WHOLE BLOOD HOLD SPECIMEN: NORMAL
WHOLE BLOOD HOLD SPECIMEN: NORMAL

## 2018-12-14 PROCEDURE — 88304 TISSUE EXAM BY PATHOLOGIST: CPT | Performed by: SURGERY

## 2018-12-14 PROCEDURE — 25010000002 ONDANSETRON PER 1 MG: Performed by: SURGERY

## 2018-12-14 PROCEDURE — 0DTJ4ZZ RESECTION OF APPENDIX, PERCUTANEOUS ENDOSCOPIC APPROACH: ICD-10-PCS | Performed by: SURGERY

## 2018-12-14 PROCEDURE — 74177 CT ABD & PELVIS W/CONTRAST: CPT | Performed by: RADIOLOGY

## 2018-12-14 PROCEDURE — 81003 URINALYSIS AUTO W/O SCOPE: CPT | Performed by: GENERAL ACUTE CARE HOSPITAL

## 2018-12-14 PROCEDURE — 94799 UNLISTED PULMONARY SVC/PX: CPT

## 2018-12-14 PROCEDURE — 83690 ASSAY OF LIPASE: CPT | Performed by: GENERAL ACUTE CARE HOSPITAL

## 2018-12-14 PROCEDURE — 25010000002 FENTANYL CITRATE (PF) 100 MCG/2ML SOLUTION: Performed by: NURSE ANESTHETIST, CERTIFIED REGISTERED

## 2018-12-14 PROCEDURE — 74177 CT ABD & PELVIS W/CONTRAST: CPT

## 2018-12-14 PROCEDURE — 25010000002 ONDANSETRON PER 1 MG: Performed by: GENERAL ACUTE CARE HOSPITAL

## 2018-12-14 PROCEDURE — 25010000002 DEXAMETHASONE PER 1 MG: Performed by: NURSE ANESTHETIST, CERTIFIED REGISTERED

## 2018-12-14 PROCEDURE — 80053 COMPREHEN METABOLIC PANEL: CPT | Performed by: GENERAL ACUTE CARE HOSPITAL

## 2018-12-14 PROCEDURE — 25010000002 MIDAZOLAM PER 1 MG: Performed by: NURSE ANESTHETIST, CERTIFIED REGISTERED

## 2018-12-14 PROCEDURE — 85025 COMPLETE CBC W/AUTO DIFF WBC: CPT | Performed by: GENERAL ACUTE CARE HOSPITAL

## 2018-12-14 PROCEDURE — 25010000002 FENTANYL CITRATE (PF) 100 MCG/2ML SOLUTION: Performed by: ANESTHESIOLOGY

## 2018-12-14 PROCEDURE — 99284 EMERGENCY DEPT VISIT MOD MDM: CPT

## 2018-12-14 PROCEDURE — 25010000002 PROPOFOL 10 MG/ML EMULSION: Performed by: NURSE ANESTHETIST, CERTIFIED REGISTERED

## 2018-12-14 PROCEDURE — 25010000002 PIPERACILLIN-TAZOBACTAM: Performed by: GENERAL ACUTE CARE HOSPITAL

## 2018-12-14 PROCEDURE — 99223 1ST HOSP IP/OBS HIGH 75: CPT | Performed by: SURGERY

## 2018-12-14 PROCEDURE — 44970 LAPAROSCOPY APPENDECTOMY: CPT | Performed by: SURGERY

## 2018-12-14 PROCEDURE — 94640 AIRWAY INHALATION TREATMENT: CPT

## 2018-12-14 PROCEDURE — 25010000002 NEOSTIGMINE 10 MG/10ML SOLUTION: Performed by: NURSE ANESTHETIST, CERTIFIED REGISTERED

## 2018-12-14 PROCEDURE — 25010000002 IOPAMIDOL 61 % SOLUTION: Performed by: GENERAL ACUTE CARE HOSPITAL

## 2018-12-14 PROCEDURE — 25010000002 MORPHINE PER 10 MG: Performed by: GENERAL ACUTE CARE HOSPITAL

## 2018-12-14 PROCEDURE — 25010000002 HYDROMORPHONE PER 4 MG: Performed by: ANESTHESIOLOGY

## 2018-12-14 DEVICE — THE ECHELON, ECHELON ENDOPATH™ AND ECHELON FLEX™ FAMILIES OF ENDOSCOPIC LINEAR CUTTERS AND RELOADS ARE STERILE, SINGLE PATIENT USE INSTRUMENTS THAT SIMULTANEOUSLY CUT AND STAPLE TISSUE. THERE ARE SIX STAGGERED ROWS OF STAPLES, THREE ON EITHER SIDE OF THE CUT LINE. THE 45 MM INSTRUMENTS HAVE A STAPLE LINE THATIS APPROXIMATELY 45 MM LONG AND A CUT LINE THAT IS APPROXIMATELY 42 MM LONG. THE SHAFT CAN ROTATE FREELY IN BOTH DIRECTIONS AND AN ARTICULATION MECHANISM ON ARTICULATING INSTRUMENTS ENABLES BENDING THE DISTAL PORTIONOF THE SHAFT TO FACILITATE LATERAL ACCESS OF THE OPERATIVE SITE.THE INSTRUMENTS ARE SHIPPED WITHOUT A RELOAD AND MUST BE LOADED PRIOR TO USE. A STAPLE RETAINING CAP ON THE RELOAD PROTECTS THE STAPLE LEG POINTS DURING SHIPPING AND TRANSPORTATION. THE INSTRUMENTS’ LOCK-OUT FEATURE IS DESIGNED TO PREVENT A USED RELOAD FROM BEING REFIRED.
Type: IMPLANTABLE DEVICE | Status: FUNCTIONAL
Brand: ECHELON ENDOPATH

## 2018-12-14 RX ORDER — GLYCOPYRROLATE 0.2 MG/ML
INJECTION INTRAMUSCULAR; INTRAVENOUS AS NEEDED
Status: DISCONTINUED | OUTPATIENT
Start: 2018-12-14 | End: 2018-12-14 | Stop reason: SURG

## 2018-12-14 RX ORDER — SODIUM CHLORIDE 0.9 % (FLUSH) 0.9 %
3-10 SYRINGE (ML) INJECTION AS NEEDED
Status: DISCONTINUED | OUTPATIENT
Start: 2018-12-14 | End: 2018-12-16 | Stop reason: HOSPADM

## 2018-12-14 RX ORDER — DOCUSATE SODIUM 100 MG/1
100 CAPSULE, LIQUID FILLED ORAL 2 TIMES DAILY
Status: DISCONTINUED | OUTPATIENT
Start: 2018-12-14 | End: 2018-12-16 | Stop reason: HOSPADM

## 2018-12-14 RX ORDER — IPRATROPIUM BROMIDE AND ALBUTEROL SULFATE 2.5; .5 MG/3ML; MG/3ML
3 SOLUTION RESPIRATORY (INHALATION) ONCE AS NEEDED
Status: DISCONTINUED | OUTPATIENT
Start: 2018-12-14 | End: 2018-12-14 | Stop reason: HOSPADM

## 2018-12-14 RX ORDER — LIDOCAINE HYDROCHLORIDE 20 MG/ML
INJECTION, SOLUTION INFILTRATION; PERINEURAL AS NEEDED
Status: DISCONTINUED | OUTPATIENT
Start: 2018-12-14 | End: 2018-12-14 | Stop reason: SURG

## 2018-12-14 RX ORDER — SODIUM CHLORIDE 9 MG/ML
INJECTION, SOLUTION INTRAVENOUS AS NEEDED
Status: DISCONTINUED | OUTPATIENT
Start: 2018-12-14 | End: 2018-12-14 | Stop reason: HOSPADM

## 2018-12-14 RX ORDER — SODIUM CHLORIDE 0.9 % (FLUSH) 0.9 %
10 SYRINGE (ML) INJECTION AS NEEDED
Status: DISCONTINUED | OUTPATIENT
Start: 2018-12-14 | End: 2018-12-16 | Stop reason: HOSPADM

## 2018-12-14 RX ORDER — OXYCODONE HYDROCHLORIDE AND ACETAMINOPHEN 5; 325 MG/1; MG/1
1 TABLET ORAL EVERY 4 HOURS PRN
Status: DISCONTINUED | OUTPATIENT
Start: 2018-12-14 | End: 2018-12-16 | Stop reason: HOSPADM

## 2018-12-14 RX ORDER — SODIUM CHLORIDE 0.9 % (FLUSH) 0.9 %
3-10 SYRINGE (ML) INJECTION AS NEEDED
Status: DISCONTINUED | OUTPATIENT
Start: 2018-12-14 | End: 2018-12-14 | Stop reason: HOSPADM

## 2018-12-14 RX ORDER — PANTOPRAZOLE SODIUM 40 MG/1
40 TABLET, DELAYED RELEASE ORAL
Status: DISCONTINUED | OUTPATIENT
Start: 2018-12-15 | End: 2018-12-16 | Stop reason: HOSPADM

## 2018-12-14 RX ORDER — MAGNESIUM HYDROXIDE 1200 MG/15ML
LIQUID ORAL AS NEEDED
Status: DISCONTINUED | OUTPATIENT
Start: 2018-12-14 | End: 2018-12-14 | Stop reason: HOSPADM

## 2018-12-14 RX ORDER — POLYETHYLENE GLYCOL 3350 17 G/17G
17 POWDER, FOR SOLUTION ORAL 2 TIMES DAILY
Status: DISCONTINUED | OUTPATIENT
Start: 2018-12-14 | End: 2018-12-16 | Stop reason: HOSPADM

## 2018-12-14 RX ORDER — MEPERIDINE HYDROCHLORIDE 25 MG/ML
12.5 INJECTION INTRAMUSCULAR; INTRAVENOUS; SUBCUTANEOUS
Status: DISCONTINUED | OUTPATIENT
Start: 2018-12-14 | End: 2018-12-14 | Stop reason: HOSPADM

## 2018-12-14 RX ORDER — IBUPROFEN 800 MG/1
800 TABLET ORAL EVERY 6 HOURS PRN
Status: DISCONTINUED | OUTPATIENT
Start: 2018-12-14 | End: 2018-12-16 | Stop reason: HOSPADM

## 2018-12-14 RX ORDER — ONDANSETRON 2 MG/ML
4 INJECTION INTRAMUSCULAR; INTRAVENOUS EVERY 4 HOURS PRN
Status: DISCONTINUED | OUTPATIENT
Start: 2018-12-14 | End: 2018-12-16 | Stop reason: HOSPADM

## 2018-12-14 RX ORDER — ROCURONIUM BROMIDE 10 MG/ML
INJECTION, SOLUTION INTRAVENOUS AS NEEDED
Status: DISCONTINUED | OUTPATIENT
Start: 2018-12-14 | End: 2018-12-14 | Stop reason: SURG

## 2018-12-14 RX ORDER — ONDANSETRON 2 MG/ML
4 INJECTION INTRAMUSCULAR; INTRAVENOUS ONCE AS NEEDED
Status: DISCONTINUED | OUTPATIENT
Start: 2018-12-14 | End: 2018-12-14 | Stop reason: HOSPADM

## 2018-12-14 RX ORDER — FENTANYL CITRATE 50 UG/ML
INJECTION, SOLUTION INTRAMUSCULAR; INTRAVENOUS AS NEEDED
Status: DISCONTINUED | OUTPATIENT
Start: 2018-12-14 | End: 2018-12-14 | Stop reason: SURG

## 2018-12-14 RX ORDER — SODIUM CHLORIDE 0.9 % (FLUSH) 0.9 %
3 SYRINGE (ML) INJECTION EVERY 12 HOURS SCHEDULED
Status: DISCONTINUED | OUTPATIENT
Start: 2018-12-14 | End: 2018-12-14 | Stop reason: HOSPADM

## 2018-12-14 RX ORDER — NEOSTIGMINE METHYLSULFATE 1 MG/ML
INJECTION, SOLUTION INTRAVENOUS AS NEEDED
Status: DISCONTINUED | OUTPATIENT
Start: 2018-12-14 | End: 2018-12-14 | Stop reason: SURG

## 2018-12-14 RX ORDER — HYDROMORPHONE HCL 110MG/55ML
0.5 PATIENT CONTROLLED ANALGESIA SYRINGE INTRAVENOUS
Status: DISCONTINUED | OUTPATIENT
Start: 2018-12-14 | End: 2018-12-14 | Stop reason: HOSPADM

## 2018-12-14 RX ORDER — SODIUM CHLORIDE 0.9 % (FLUSH) 0.9 %
3 SYRINGE (ML) INJECTION EVERY 12 HOURS SCHEDULED
Status: DISCONTINUED | OUTPATIENT
Start: 2018-12-14 | End: 2018-12-16 | Stop reason: HOSPADM

## 2018-12-14 RX ORDER — PROPOFOL 10 MG/ML
VIAL (ML) INTRAVENOUS AS NEEDED
Status: DISCONTINUED | OUTPATIENT
Start: 2018-12-14 | End: 2018-12-14 | Stop reason: SURG

## 2018-12-14 RX ORDER — ONDANSETRON 2 MG/ML
4 INJECTION INTRAMUSCULAR; INTRAVENOUS ONCE
Status: COMPLETED | OUTPATIENT
Start: 2018-12-14 | End: 2018-12-14

## 2018-12-14 RX ORDER — BUDESONIDE AND FORMOTEROL FUMARATE DIHYDRATE 160; 4.5 UG/1; UG/1
2 AEROSOL RESPIRATORY (INHALATION) 2 TIMES DAILY PRN
Status: DISCONTINUED | OUTPATIENT
Start: 2018-12-14 | End: 2018-12-16 | Stop reason: HOSPADM

## 2018-12-14 RX ORDER — FENTANYL CITRATE 50 UG/ML
100 INJECTION, SOLUTION INTRAMUSCULAR; INTRAVENOUS ONCE
Status: COMPLETED | OUTPATIENT
Start: 2018-12-14 | End: 2018-12-14

## 2018-12-14 RX ORDER — HEPARIN SODIUM 5000 [USP'U]/ML
5000 INJECTION, SOLUTION INTRAVENOUS; SUBCUTANEOUS EVERY 12 HOURS SCHEDULED
Status: DISCONTINUED | OUTPATIENT
Start: 2018-12-15 | End: 2018-12-16 | Stop reason: HOSPADM

## 2018-12-14 RX ORDER — SODIUM CHLORIDE, SODIUM LACTATE, POTASSIUM CHLORIDE, CALCIUM CHLORIDE 600; 310; 30; 20 MG/100ML; MG/100ML; MG/100ML; MG/100ML
125 INJECTION, SOLUTION INTRAVENOUS CONTINUOUS
Status: DISCONTINUED | OUTPATIENT
Start: 2018-12-14 | End: 2018-12-16 | Stop reason: HOSPADM

## 2018-12-14 RX ORDER — SODIUM CHLORIDE, SODIUM LACTATE, POTASSIUM CHLORIDE, CALCIUM CHLORIDE 600; 310; 30; 20 MG/100ML; MG/100ML; MG/100ML; MG/100ML
100 INJECTION, SOLUTION INTRAVENOUS CONTINUOUS
Status: DISCONTINUED | OUTPATIENT
Start: 2018-12-14 | End: 2018-12-15

## 2018-12-14 RX ORDER — MORPHINE SULFATE 10 MG/ML
8 INJECTION INTRAMUSCULAR; INTRAVENOUS; SUBCUTANEOUS ONCE
Status: COMPLETED | OUTPATIENT
Start: 2018-12-14 | End: 2018-12-14

## 2018-12-14 RX ORDER — FAMOTIDINE 10 MG/ML
INJECTION, SOLUTION INTRAVENOUS AS NEEDED
Status: DISCONTINUED | OUTPATIENT
Start: 2018-12-14 | End: 2018-12-14 | Stop reason: SURG

## 2018-12-14 RX ORDER — DEXAMETHASONE SODIUM PHOSPHATE 10 MG/ML
INJECTION INTRAMUSCULAR; INTRAVENOUS AS NEEDED
Status: DISCONTINUED | OUTPATIENT
Start: 2018-12-14 | End: 2018-12-14 | Stop reason: SURG

## 2018-12-14 RX ORDER — MIDAZOLAM HYDROCHLORIDE 1 MG/ML
INJECTION INTRAMUSCULAR; INTRAVENOUS AS NEEDED
Status: DISCONTINUED | OUTPATIENT
Start: 2018-12-14 | End: 2018-12-14 | Stop reason: SURG

## 2018-12-14 RX ORDER — FENTANYL CITRATE 50 UG/ML
50 INJECTION, SOLUTION INTRAMUSCULAR; INTRAVENOUS
Status: DISCONTINUED | OUTPATIENT
Start: 2018-12-14 | End: 2018-12-14 | Stop reason: HOSPADM

## 2018-12-14 RX ADMIN — SODIUM CHLORIDE 1000 ML: 9 INJECTION, SOLUTION INTRAVENOUS at 15:06

## 2018-12-14 RX ADMIN — MORPHINE SULFATE 8 MG: 10 INJECTION INTRAVENOUS at 14:51

## 2018-12-14 RX ADMIN — SODIUM CHLORIDE, POTASSIUM CHLORIDE, SODIUM LACTATE AND CALCIUM CHLORIDE: 600; 310; 30; 20 INJECTION, SOLUTION INTRAVENOUS at 20:48

## 2018-12-14 RX ADMIN — LIDOCAINE HYDROCHLORIDE 60 MG: 20 INJECTION, SOLUTION INFILTRATION; PERINEURAL at 20:54

## 2018-12-14 RX ADMIN — PIPERACILLIN SODIUM,TAZOBACTAM SODIUM 3.38 G: 3; .375 INJECTION, POWDER, FOR SOLUTION INTRAVENOUS at 15:06

## 2018-12-14 RX ADMIN — FENTANYL CITRATE 100 MCG: 50 INJECTION INTRAMUSCULAR; INTRAVENOUS at 16:51

## 2018-12-14 RX ADMIN — GLYCOPYRROLATE 0.4 MG: 0.2 INJECTION, SOLUTION INTRAMUSCULAR; INTRAVENOUS at 21:25

## 2018-12-14 RX ADMIN — NEOSTIGMINE METHYLSULFATE 3 MG: 1 INJECTION, SOLUTION INTRAVENOUS at 21:25

## 2018-12-14 RX ADMIN — IOPAMIDOL 100 ML: 612 INJECTION, SOLUTION INTRAVENOUS at 14:31

## 2018-12-14 RX ADMIN — DEXAMETHASONE SODIUM PHOSPHATE 4 MG: 10 INJECTION INTRAMUSCULAR; INTRAVENOUS at 20:50

## 2018-12-14 RX ADMIN — BUDESONIDE AND FORMOTEROL FUMARATE DIHYDRATE 2 PUFF: 160; 4.5 AEROSOL RESPIRATORY (INHALATION) at 23:18

## 2018-12-14 RX ADMIN — FENTANYL CITRATE 100 MCG: 50 INJECTION INTRAMUSCULAR; INTRAVENOUS at 20:48

## 2018-12-14 RX ADMIN — SODIUM CHLORIDE, PRESERVATIVE FREE 3 ML: 5 INJECTION INTRAVENOUS at 23:20

## 2018-12-14 RX ADMIN — PROPOFOL 150 MG: 10 INJECTION, EMULSION INTRAVENOUS at 20:54

## 2018-12-14 RX ADMIN — ONDANSETRON 4 MG: 2 INJECTION, SOLUTION INTRAMUSCULAR; INTRAVENOUS at 14:20

## 2018-12-14 RX ADMIN — ROCURONIUM BROMIDE 30 MG: 10 INJECTION INTRAVENOUS at 20:54

## 2018-12-14 RX ADMIN — FAMOTIDINE 20 MG: 10 INJECTION, SOLUTION INTRAVENOUS at 20:54

## 2018-12-14 RX ADMIN — SODIUM CHLORIDE, POTASSIUM CHLORIDE, SODIUM LACTATE AND CALCIUM CHLORIDE 125 ML/HR: 600; 310; 30; 20 INJECTION, SOLUTION INTRAVENOUS at 22:28

## 2018-12-14 RX ADMIN — SODIUM CHLORIDE, POTASSIUM CHLORIDE, SODIUM LACTATE AND CALCIUM CHLORIDE 100 ML/HR: 600; 310; 30; 20 INJECTION, SOLUTION INTRAVENOUS at 23:20

## 2018-12-14 RX ADMIN — MIDAZOLAM HYDROCHLORIDE 2 MG: 1 INJECTION, SOLUTION INTRAMUSCULAR; INTRAVENOUS at 20:48

## 2018-12-14 RX ADMIN — ONDANSETRON 4 MG: 2 INJECTION, SOLUTION INTRAMUSCULAR; INTRAVENOUS at 20:54

## 2018-12-14 RX ADMIN — HYDROMORPHONE HYDROCHLORIDE 0.5 MG: 2 INJECTION, SOLUTION INTRAMUSCULAR; INTRAVENOUS; SUBCUTANEOUS at 18:22

## 2018-12-14 NOTE — ANESTHESIA PREPROCEDURE EVALUATION
Anesthesia Evaluation     Patient summary reviewed and Nursing notes reviewed   history of anesthetic complications: PONV  NPO Solid Status: > 8 hours  NPO Liquid Status: > 8 hours           Airway   Mallampati: III  TM distance: >3 FB  Neck ROM: full  no difficulty expected  Dental - normal exam   (+) poor dentition    Comment: Small chip upper R incisor    Pulmonary - normal exam   (+) a smoker Current Abstained day of surgery, sleep apnea (mild),   (-) asthma, shortness of breath  Cardiovascular - normal exam  Exercise tolerance: good (4-7 METS)    NYHA Classification: II    (+) hypertension, hyperlipidemia,       Neuro/Psych  (+) headaches, psychiatric history Anxiety and Depression,     GI/Hepatic/Renal/Endo    (+) obesity,  hiatal hernia, GERD,  renal disease stones,   (-) morbid obesity, liver disease, diabetes, hypothyroidism    Musculoskeletal     (+) back pain,   Abdominal  - normal exam    Bowel sounds: normal.   Substance History   (+) alcohol use,      OB/GYN negative ob/gyn ROS         Other - negative ROS                         Anesthesia Plan    ASA 3     general     intravenous induction   Anesthetic plan, all risks, benefits, and alternatives have been provided, discussed and informed consent has been obtained with: patient.  Use of blood products discussed with patient  Consented to blood products.

## 2018-12-14 NOTE — ED PROVIDER NOTES
Subjective   Past medical history of kidney stones, GERD, depression, Nissen procedure. Presenting with generalized abdominal pain since Monday that migrated to right lower quadrant yesterday.  Was supposed to get a CT abdomen and pelvis outpatient but was unable tolerate the by mouth contrast.  Relating anorexia with nausea and some emesis.  Had normal bowel movement yesterday.  Has chills at home but no active fever.  Denies any chest pain, shortness breath, back pain,  symptoms, near/syncope.        Abdominal Pain   Pain location:  RLQ  Pain quality: aching    Pain radiates to:  Does not radiate  Pain severity:  Severe  Onset quality:  Gradual  Duration:  4 days  Timing:  Constant  Progression:  Worsening  Context: not alcohol use, not awakening from sleep, not diet changes, not medication withdrawal, not previous surgeries, not recent illness, not retching, not sick contacts and not suspicious food intake    Associated symptoms: nausea and vomiting    Associated symptoms: no anorexia, no belching, no chest pain, no chills, no dysuria, no fatigue, no fever, no flatus, no melena and no shortness of breath        Review of Systems   Constitutional: Negative.  Negative for chills, fatigue and fever.   HENT: Negative.    Eyes: Negative.    Respiratory: Negative.  Negative for shortness of breath.    Cardiovascular: Negative.  Negative for chest pain.   Gastrointestinal: Positive for abdominal pain, nausea and vomiting. Negative for anorexia, flatus and melena.   Endocrine: Negative.    Genitourinary: Negative.  Negative for dysuria.   Musculoskeletal: Negative.  Negative for arthralgias and back pain.   Skin: Negative.    Neurological: Negative.    Hematological: Negative.    Psychiatric/Behavioral: Negative.        Past Medical History:   Diagnosis Date   • Anxiety    • Chronic laryngitis     HISTORY OF   • Depression    • Fatigue    • GERD (gastroesophageal reflux disease)     full history in HPI.  SEVERE, on H2  blocker only due ot insurance not covering PPI.  Loses employment opportunities due to hoarseness from reflux.    • Hyperlipidemia    • Hypertension    • Kidney stones    • Migraine    • Prediabetes     HISTORY OF   • Sleep apnea     does not use CPAP due to intolerance   • Spinal headache        No Known Allergies    Past Surgical History:   Procedure Laterality Date   • ABDOMINAL SURGERY     • CARDIOVASCULAR STRESS TEST  2017   •  SECTION PRIMARY      done under general anesthesia b/c couldn't get epidural   •  SECTION WITH TUBAL     • DIAGNOSTIC LAPAROSCOPY      looking for endometriosis   • ECHO - CONVERTED  2017   • ENDOMETRIAL FULGURATION     • ENDOSCOPY  2017    in Dr. Beck Gongora   • TUBAL ABDOMINAL LIGATION         Family History   Problem Relation Age of Onset   • No Known Problems Mother    • No Known Problems Father    • Obesity Sister    • Hypertension Sister        Social History     Socioeconomic History   • Marital status:      Spouse name: Heath Garsia   • Number of children: 2   • Years of education: high School [employer]    • Highest education level: Not on file   Occupational History   • Occupation: Unemployed    Tobacco Use   • Smoking status: Current Some Day Smoker     Packs/day: 0.50     Years: 12.00     Pack years: 6.00     Types: Cigarettes   • Smokeless tobacco: Never Used   • Tobacco comment: I not around secondhand smoke in house or car unless visits certain family members   Substance and Sexual Activity   • Alcohol use: Yes     Comment: occasional   • Drug use: No   • Sexual activity: Defer     Partners: Male     Birth control/protection: Surgical     Comment:     Social History Narrative    Lives with  and children.  Unemployed due to not being able to find work due to hoarse voice.  Denied disability due to hoarseness not that severe.             Objective   Physical Exam   Constitutional: She is oriented to person, place, and time.  She appears well-developed and well-nourished.  Non-toxic appearance. She does not appear ill. No distress.   HENT:   Head: Normocephalic and atraumatic.   Eyes: EOM are normal. Pupils are equal, round, and reactive to light.   Cardiovascular: Normal rate, regular rhythm, normal heart sounds and intact distal pulses.   Pulmonary/Chest: Effort normal and breath sounds normal. No stridor. No respiratory distress. She has no wheezes. She has no rales.   Abdominal: Soft. Bowel sounds are normal. She exhibits no pulsatile liver and no ascites. There is no hepatosplenomegaly, splenomegaly or hepatomegaly. There is generalized tenderness and tenderness in the right lower quadrant. There is no rigidity, no rebound, no guarding, no CVA tenderness, no tenderness at McBurney's point and negative Martinez's sign.   Neurological: She is alert and oriented to person, place, and time.   Skin: Capillary refill takes less than 2 seconds.   Psychiatric: She has a normal mood and affect. Her behavior is normal.       Procedures           ED Course                  MDM  Number of Diagnoses or Management Options  Acute appendicitis with localized peritonitis, without perforation, abscess, or gangrene:   Diagnosis management comments: Acute appendicitis. Surgery called immediately. Pt remains calm and hemodynamically stable. Admission to OR.        Amount and/or Complexity of Data Reviewed  Clinical lab tests: ordered and reviewed  Tests in the radiology section of CPT®: ordered and reviewed  Tests in the medicine section of CPT®: ordered and reviewed  Discussion of test results with the performing providers: yes  Decide to obtain previous medical records or to obtain history from someone other than the patient: yes  Obtain history from someone other than the patient: yes  Review and summarize past medical records: yes  Discuss the patient with other providers: yes  Independent visualization of images, tracings, or specimens: yes    Risk  of Complications, Morbidity, and/or Mortality  Presenting problems: high  Diagnostic procedures: high  Management options: high    Patient Progress  Patient progress: stable        Final diagnoses:   None            Alo Quiroz Jr., MD  12/15/18 0710

## 2018-12-14 NOTE — H&P
Chief complaint appendicitis    Subjective     Patient is a 38 y.o. female who presented to the ED with a 4 day history of abdominal pain.  This started Monday and was periumbilical and right lower quadrant.  The patient states the pain came and went until last night when it became severe and she had anorexia.  She vomited once.  No diarrhea.  Because of the progression in severity of the pain the patient came to the emergency room which demonstrated appendicitis.  No fever or chills      Review of Systems  Review of Systems - General ROS: negative for - weight loss  Psychological ROS: negative for - behavioral disorder  Ophthalmic ROS: negative for - dry eyes  ENT ROS: negative for - vertigo or vocal changes  Hematological and Lymphatic ROS: negative for - jaundice or swollen lymph nodes  Respiratory ROS: negative for - sputum changes or stridor  Cardiovascular ROS: negative for - irregular heartbeat or murmur  Gastrointestinal ROS: negative for - blood in stools or change in stools  Genito-Urinary ROS: negative for - hematuria or incontinence  Musculoskeletal ROS: negative for - gait disturbance      History  Past Medical History:   Diagnosis Date   • Anxiety    • Chronic laryngitis     HISTORY OF   • Depression    • Endometriosis    • Fatigue    • GERD (gastroesophageal reflux disease)     full history in HPI.  SEVERE, on H2 blocker only due ot insurance not covering PPI.  Loses employment opportunities due to hoarseness from reflux.    • Hyperlipidemia    • Hypertension    • Kidney stones    • Migraine    • Prediabetes     HISTORY OF   • Sleep apnea     does not use CPAP due to intolerance   • Spinal headache      Past Surgical History:   Procedure Laterality Date   • ABDOMINAL SURGERY     • CARDIOVASCULAR STRESS TEST  2017   •  SECTION PRIMARY      done under general anesthesia b/c couldn't get epidural   •  SECTION WITH TUBAL     • DIAGNOSTIC LAPAROSCOPY      looking for  endometriosis   • ECHO - CONVERTED  09/08/2017   • ENDOMETRIAL FULGURATION     • ENDOSCOPY  2017    in Dr. Beck Gongora   • TUBAL ABDOMINAL LIGATION  2006     Family History   Problem Relation Age of Onset   • No Known Problems Mother    • No Known Problems Father    • Obesity Sister    • Hypertension Sister      Social History     Tobacco Use   • Smoking status: Current Some Day Smoker     Packs/day: 0.50     Years: 12.00     Pack years: 6.00     Types: Cigarettes   • Smokeless tobacco: Never Used   • Tobacco comment: I not around secondhand smoke in house or car unless visits certain family members   Substance Use Topics   • Alcohol use: Yes     Comment: occasional   • Drug use: No     Medications Prior to Admission   Medication Sig Dispense Refill Last Dose   • budesonide-formoterol (SYMBICORT) 160-4.5 MCG/ACT inhaler Inhale 2 puffs 2 (Two) Times a Day As Needed.   More than a month at Unknown time   • ibuprofen (ADVIL,MOTRIN) 800 MG tablet Take 1 tablet by mouth Every 6 (Six) Hours As Needed for Mild Pain . 25 tablet 0 12/11/2018   • lisinopril-hydrochlorothiazide (PRINZIDE,ZESTORETIC) 10-12.5 MG per tablet Take 1 tablet by mouth Daily.   12/12/2018     Allergies:  Patient has no known allergies.    Objective     Vital Signs  Temp:  [98.3 °F (36.8 °C)-100.7 °F (38.2 °C)] 99.5 °F (37.5 °C)  Heart Rate:  [76-95] 95  Resp:  [18-20] 20  BP: (111-135)/(62-76) 124/71    Physical Exam  General:  This is a WD WN female in no acute distress  Vital signs stable, afebrile  HEENT exam:  WNL. Sclera are anicteric.  EOMI  Neck:  supple, FROM.  No JVD.  Trachea midline  Lungs:  Respiratory effort normal. Auscultation: Clear, without wheezes, rhonchi, rales  Heart:  Regular rate and rhythm, without murmur, gallop, rub.  No pedal edema  Abdomen: Bowel sounds absent.  There is tenderness in the right side of the abdomen with guarding.  Musculoskeletal:  muscle strength/tone is normal.    Psyc:  alert, oriented x 3.  Mood and  affect are appropriate  skin:  Warm with good turgor.  Without rash or lesion  extremities:  Examination of the extremities revealed no cyanosis, clubbing or edema.    Results Review:       Results from last 7 days   Lab Units  12/14/18   1210   WBC 10*3/mm3  16.62*   HEMOGLOBIN g/dL  13.2   HEMATOCRIT %  41.4   PLATELETS 10*3/mm3  334         Results from last 7 days   Lab Units  12/14/18   1210   SODIUM mmol/L  141   POTASSIUM mmol/L  4.1   CHLORIDE mmol/L  108   CO2 mmol/L  28.3   BUN mg/dL  13   CREATININE mg/dL  0.84   EGFR IF NONAFRICN AM mL/min/1.73  76   CALCIUM mg/dL  9.4   GLUCOSE mg/dL  117*   ALBUMIN g/dL  4.60   BILIRUBIN mg/dL  1.2   ALK PHOS U/L  106*   AST (SGOT) U/L  15   ALT (SGPT) U/L  15   Estimated Creatinine Clearance: 91.9 mL/min (by C-G formula based on SCr of 0.84 mg/dL).  No results found for: AMMONIA                    Imaging:  Imaging Results (last 24 hours)     Procedure Component Value Units Date/Time    CT Abdomen Pelvis With Contrast [[id number]] Collected:  12/14/18 1436     Updated:  12/14/18 1439    Narrative:       EXAM: CT ABDOMEN PELVIS W CONTRAST-              TECHNIQUE: Multiple axial CT images were obtained from lung bases  through pubic symphysis WITH administration of IV contrast. Reformatted  images in the coronal and/or sagittal plane(s) were generated from the  axial data set to facilitate diagnostic accuracy and/or surgical  planning.  Oral Contrast:NONE.     Radiation dose reduction techniques were utilized per ALARA protocol.  Automated exposure control was initiated through either or [employer] or  DoseRight software packages by  protocol.       DOSE: 1316.75316 mGy.cm     Clinical information  Abdominal pain, unspecified      Comparison  NONE.     Findings  LOWER THORAX: Clear. No effusions.     ABDOMEN:        LIVER: Homogeneous. No focal hepatic mass or ductal dilatation.        GALLBLADDER: No dilation or stone identified.        PANCREAS: Unremarkable.  No mass or ductal dilatation.        SPLEEN: Homogeneous. No splenomegaly.        ADRENALS: No mass.        KIDNEYS/URETERS: No mass. No obstructive uropathy.  No evidence of  urolithiasis.        GI TRACT: Non-dilated. No definite wall thickening.        PERITONEUM: FREE FLUID LOWER PELVIS.        MESENTERY: Unremarkable.        LYMPH NODES: No lymphadenopathy.        VASCULATURE: No evidence of aneurysm.        ABDOMINAL WALL: No focal hernia or mass.           OTHER: None.     PELVIS:        BLADDER: No focal mass or significant wall thickening        REPRODUCTIVE: HYSTERECTOMY.        APPENDIX: ACUTE APPENDICITIS IS NOTED WITH DILATED FLUID-FILLED  APPENDIX, LARGE APPENDICOLITH NEAR THE APPENDIX BASE, AND SURROUNDING  INFLAMMATION.     BONES: No acute bony abnormality.       Impression:       Impression:  ACUTE APPENDICITIS AS DESCRIBED ABOVE. NO ABSCESS OR DEFINITE EVIDENCE  OF RUPTURE.        This report was finalized on 12/14/2018 2:37 PM by Dr. Brayan Jeff MD.             CT report and images were reviewed and I with the assessment      Impression:  Patient Active Problem List   Diagnosis Code   • Leiomyoma D21.9   • Encounter for consultation Z71.9   • Tobacco use Z72.0   • Hoarseness R49.0   • Preoperative clearance Z01.818   • Obesity, Class III, BMI 40-49.9 (morbid obesity) (CMS/HCC) E66.01   • Essential hypertension I10   • Mixed hyperlipidemia E78.2   • Abnormal EKG R94.31   • SOB (shortness of breath) R06.02   • Gastroesophageal reflux disease with esophagitis K21.0   • Gastroesophageal reflux disease K21.9   • Hand pain, right M79.641   • Kidney stone N20.0   • Appendicitis K37       Plan:  Laparoscopic appendectomy, possible open      Discussion:    Errors end dictation may reflect use of voice recognition software and not all errors in transcription may have been detected prior to signing.  Augustina Ayala MD  12/14/18  6:32 PM

## 2018-12-15 PROCEDURE — 99024 POSTOP FOLLOW-UP VISIT: CPT | Performed by: SURGERY

## 2018-12-15 PROCEDURE — 94799 UNLISTED PULMONARY SVC/PX: CPT

## 2018-12-15 PROCEDURE — 25010000002 PROMETHAZINE PER 50 MG: Performed by: SURGERY

## 2018-12-15 PROCEDURE — 25010000002 ONDANSETRON PER 1 MG: Performed by: SURGERY

## 2018-12-15 PROCEDURE — 25010000002 PIPERACILLIN-TAZOBACTAM: Performed by: SURGERY

## 2018-12-15 PROCEDURE — 25010000002 HEPARIN (PORCINE) PER 1000 UNITS: Performed by: SURGERY

## 2018-12-15 PROCEDURE — 25010000002 MORPHINE PER 10 MG: Performed by: SURGERY

## 2018-12-15 RX ORDER — MORPHINE SULFATE 10 MG/ML
4 INJECTION INTRAMUSCULAR; INTRAVENOUS; SUBCUTANEOUS EVERY 4 HOURS PRN
Status: DISCONTINUED | OUTPATIENT
Start: 2018-12-15 | End: 2018-12-16 | Stop reason: HOSPADM

## 2018-12-15 RX ADMIN — BUDESONIDE AND FORMOTEROL FUMARATE DIHYDRATE 2 PUFF: 160; 4.5 AEROSOL RESPIRATORY (INHALATION) at 06:31

## 2018-12-15 RX ADMIN — PIPERACILLIN SODIUM,TAZOBACTAM SODIUM 3.38 G: 3; .375 INJECTION, POWDER, FOR SOLUTION INTRAVENOUS at 00:19

## 2018-12-15 RX ADMIN — PIPERACILLIN SODIUM,TAZOBACTAM SODIUM 3.38 G: 3; .375 INJECTION, POWDER, FOR SOLUTION INTRAVENOUS at 23:45

## 2018-12-15 RX ADMIN — DOCUSATE SODIUM 100 MG: 100 CAPSULE ORAL at 20:29

## 2018-12-15 RX ADMIN — HEPARIN SODIUM 5000 UNITS: 5000 INJECTION, SOLUTION INTRAVENOUS; SUBCUTANEOUS at 23:45

## 2018-12-15 RX ADMIN — SODIUM CHLORIDE, PRESERVATIVE FREE 3 ML: 5 INJECTION INTRAVENOUS at 20:29

## 2018-12-15 RX ADMIN — ONDANSETRON 4 MG: 2 INJECTION, SOLUTION INTRAMUSCULAR; INTRAVENOUS at 03:44

## 2018-12-15 RX ADMIN — PIPERACILLIN SODIUM,TAZOBACTAM SODIUM 3.38 G: 3; .375 INJECTION, POWDER, FOR SOLUTION INTRAVENOUS at 06:09

## 2018-12-15 RX ADMIN — OXYCODONE HYDROCHLORIDE AND ACETAMINOPHEN 1 TABLET: 5; 325 TABLET ORAL at 03:43

## 2018-12-15 RX ADMIN — DOCUSATE SODIUM 100 MG: 100 CAPSULE ORAL at 00:18

## 2018-12-15 RX ADMIN — POLYETHYLENE GLYCOL (3350) 17 G: 17 POWDER, FOR SOLUTION ORAL at 20:29

## 2018-12-15 RX ADMIN — POLYETHYLENE GLYCOL (3350) 17 G: 17 POWDER, FOR SOLUTION ORAL at 09:25

## 2018-12-15 RX ADMIN — DOCUSATE SODIUM 100 MG: 100 CAPSULE ORAL at 09:25

## 2018-12-15 RX ADMIN — SODIUM CHLORIDE, POTASSIUM CHLORIDE, SODIUM LACTATE AND CALCIUM CHLORIDE 125 ML/HR: 600; 310; 30; 20 INJECTION, SOLUTION INTRAVENOUS at 20:29

## 2018-12-15 RX ADMIN — PROMETHAZINE HYDROCHLORIDE 12.5 MG: 25 INJECTION INTRAMUSCULAR; INTRAVENOUS at 22:48

## 2018-12-15 RX ADMIN — POLYETHYLENE GLYCOL (3350) 17 G: 17 POWDER, FOR SOLUTION ORAL at 00:18

## 2018-12-15 RX ADMIN — SODIUM CHLORIDE, PRESERVATIVE FREE 3 ML: 5 INJECTION INTRAVENOUS at 09:26

## 2018-12-15 RX ADMIN — LISINOPRIL: 10 TABLET ORAL at 09:26

## 2018-12-15 RX ADMIN — SODIUM CHLORIDE, POTASSIUM CHLORIDE, SODIUM LACTATE AND CALCIUM CHLORIDE 125 ML/HR: 600; 310; 30; 20 INJECTION, SOLUTION INTRAVENOUS at 12:50

## 2018-12-15 RX ADMIN — MORPHINE SULFATE 4 MG: 10 INJECTION INTRAVENOUS at 13:10

## 2018-12-15 RX ADMIN — PIPERACILLIN SODIUM,TAZOBACTAM SODIUM 3.38 G: 3; .375 INJECTION, POWDER, FOR SOLUTION INTRAVENOUS at 14:19

## 2018-12-15 RX ADMIN — ONDANSETRON 4 MG: 2 INJECTION, SOLUTION INTRAMUSCULAR; INTRAVENOUS at 18:04

## 2018-12-15 RX ADMIN — ONDANSETRON 4 MG: 2 INJECTION, SOLUTION INTRAMUSCULAR; INTRAVENOUS at 13:08

## 2018-12-15 NOTE — PLAN OF CARE
Problem: Skin Injury Risk (Adult)  Goal: Identify Related Risk Factors and Signs and Symptoms  Outcome: Ongoing (interventions implemented as appropriate)   12/15/18 1457   Skin Injury Risk (Adult)   Related Risk Factors (Skin Injury Risk) infection;mobility impaired     Goal: Skin Health and Integrity  Outcome: Ongoing (interventions implemented as appropriate)   12/15/18 1457   Skin Injury Risk (Adult)   Skin Health and Integrity making progress toward outcome       Problem: Infection, Risk/Actual (Adult)  Goal: Identify Related Risk Factors and Signs and Symptoms  Outcome: Outcome(s) achieved Date Met: 12/15/18   12/15/18 1457   Infection, Risk/Actual (Adult)   Related Risk Factors (Infection, Risk/Actual) surgery/procedure   Signs and Symptoms (Infection, Risk/Actual) weakness;pain;nausea     Goal: Infection Prevention/Resolution  Outcome: Ongoing (interventions implemented as appropriate)   12/15/18 1457   Infection, Risk/Actual (Adult)   Infection Prevention/Resolution making progress toward outcome       Problem: Fall Risk (Adult)  Goal: Identify Related Risk Factors and Signs and Symptoms  Outcome: Outcome(s) achieved Date Met: 12/15/18   12/15/18 1457   Fall Risk (Adult)   Related Risk Factors (Fall Risk) fatigue/slow reaction;environment unfamiliar   Signs and Symptoms (Fall Risk) presence of risk factors     Goal: Absence of Fall  Outcome: Ongoing (interventions implemented as appropriate)   12/15/18 1457   Fall Risk (Adult)   Absence of Fall making progress toward outcome

## 2018-12-15 NOTE — PROGRESS NOTES
LOS: 1 day   Patient Care Team:  Kathy Rhodes APRN as PCP - General  Kathy Rhodes APRN as PCP - Family Medicine  Belgica Huston MD as Surgeon (General Surgery)  Henok Valles MD as Consulting Physician (Cardiology)    Post op day # 1, status post laparoscopic appendectomy    Subjective     Interval History:  Patient is up sitting in the chair.  She states she is voiding and has ambulated outside the room.  She is tolerating the clear liquids without vomiting although it did require some Zofran this morning.     History taken from patient.      Objective     Vital Signs  Temp:  [98.1 °F (36.7 °C)-100.7 °F (38.2 °C)] 98.6 °F (37 °C)  Heart Rate:  [] 102  Resp:  [16-22] 20  BP: ()/(55-84) 103/69    Physical Exam:  This is a well-developed well-nourished  overweight female in no acute distress  HEENT examination: Sclera are anicteric  Lungs: Clear  Abdomen: Hypoactive bowel sounds.  Expected incisional tenderness  Skin/incisions: Incision sites were inspected and dressings dry   Results Review:        Results from last 7 days   Lab Units  12/14/18   1210   WBC 10*3/mm3  16.62*   HEMOGLOBIN g/dL  13.2   HEMATOCRIT %  41.4   PLATELETS 10*3/mm3  334         Results from last 7 days   Lab Units  12/14/18   1210   SODIUM mmol/L  141   POTASSIUM mmol/L  4.1   CHLORIDE mmol/L  108   CO2 mmol/L  28.3   BUN mg/dL  13   CREATININE mg/dL  0.84   EGFR IF NONAFRICN AM mL/min/1.73  76   CALCIUM mg/dL  9.4   GLUCOSE mg/dL  117*   ALBUMIN g/dL  4.60   BILIRUBIN mg/dL  1.2   ALK PHOS U/L  106*   AST (SGOT) U/L  15   ALT (SGPT) U/L  15   Estimated Creatinine Clearance: 91.9 mL/min (by C-G formula based on SCr of 0.84 mg/dL).  No results found for: AMMONIA         Imaging:  Imaging Results (last 24 hours)     Procedure Component Value Units Date/Time    CT Abdomen Pelvis With Contrast [[id number]] Collected:  12/14/18 1436     Updated:  12/14/18 1439    Narrative:       EXAM: CT ABDOMEN PELVIS W  CONTRAST-              TECHNIQUE: Multiple axial CT images were obtained from lung bases  through pubic symphysis WITH administration of IV contrast. Reformatted  images in the coronal and/or sagittal plane(s) were generated from the  axial data set to facilitate diagnostic accuracy and/or surgical  planning.  Oral Contrast:NONE.     Radiation dose reduction techniques were utilized per ALARA protocol.  Automated exposure control was initiated through either or [employer] or  DoseRight software packages by  protocol.       DOSE: 1316.63477 mGy.cm     Clinical information  Abdominal pain, unspecified      Comparison  NONE.     Findings  LOWER THORAX: Clear. No effusions.     ABDOMEN:        LIVER: Homogeneous. No focal hepatic mass or ductal dilatation.        GALLBLADDER: No dilation or stone identified.        PANCREAS: Unremarkable. No mass or ductal dilatation.        SPLEEN: Homogeneous. No splenomegaly.        ADRENALS: No mass.        KIDNEYS/URETERS: No mass. No obstructive uropathy.  No evidence of  urolithiasis.        GI TRACT: Non-dilated. No definite wall thickening.        PERITONEUM: FREE FLUID LOWER PELVIS.        MESENTERY: Unremarkable.        LYMPH NODES: No lymphadenopathy.        VASCULATURE: No evidence of aneurysm.        ABDOMINAL WALL: No focal hernia or mass.           OTHER: None.     PELVIS:        BLADDER: No focal mass or significant wall thickening        REPRODUCTIVE: HYSTERECTOMY.        APPENDIX: ACUTE APPENDICITIS IS NOTED WITH DILATED FLUID-FILLED  APPENDIX, LARGE APPENDICOLITH NEAR THE APPENDIX BASE, AND SURROUNDING  INFLAMMATION.     BONES: No acute bony abnormality.       Impression:       Impression:  ACUTE APPENDICITIS AS DESCRIBED ABOVE. NO ABSCESS OR DEFINITE EVIDENCE  OF RUPTURE.        This report was finalized on 12/14/2018 2:37 PM by Dr. Brayan Jeff MD.              Impression:  Stable course, status post laparoscopic appendectomy for acute appendicitis  with perforation    Plan:  Continue antibiotics  Advanced to regular diet    Errors end dictation may reflect use of voice recognition software and not all errors in transcription may have been detected prior to signing.  Augustina Ayala MD  12/15/18  10:56 AM

## 2018-12-15 NOTE — PAYOR COMM NOTE
"[facility]   KWAKU SMYTH  PHONE  [phone]  FAX  [phone]  NPI:  [id number]    REQUEST FOR INPATIENT AUTH    Patricia Ponce (38 y.o. Female)     Date of Birth Social Security Number Address Home Phone MRN    [id number]  [id number] [id number] [phone] [id number]    Jew Marital Status          None        Admission Date Admission Type Admitting Provider Attending Provider Department, Room/Bed    12/14/18 Emergency Augustina Ayala MD Michna, Barbara A, MD [address], 3337/1P    Discharge Date Discharge Disposition Discharge Destination                       Attending Provider:  Augustina Ayala MD    Allergies:  No Known Allergies    Isolation:  None   Infection:  None   Code Status:  CPR    Ht:  154.9 cm (61\")   Wt:  88.5 kg (195 lb)    Admission Cmt:  None   Principal Problem:  Appendicitis [K37] More...                 Active Insurance as of 12/14/2018     Primary Coverage     Payor Plan Insurance Group Employer/Plan Group    [employer] [place] [employer] [place]      Payor Plan Address Payor Plan Phone Number Payor Plan Fax Number Effective Dates    [address] [address] [phone]  2/8/2017 - None Entered    [facility] [id number]       Subscriber Name Subscriber Birth Date Member ID       PATRICIA PONCE 1980 [id number]                 Emergency Contacts      (Rel.) Home Phone Work Phone Mobile Phone    Heath Ponce (Spouse) [phone] -- --               History & Physical      Augustina Ayala MD at 12/14/2018  6:32 PM                Chief complaint appendicitis    Subjective     Patient is a 38 y.o. female who presented to the ED with a 4 day history of abdominal pain.  This started Monday and was periumbilical and right lower quadrant.  The patient states the pain came and went until last night when it became severe and she had anorexia.  She vomited once.  No diarrhea.  Because of the progression in severity of the pain " the patient came to the emergency room which demonstrated appendicitis.  No fever or chills      Review of Systems  Review of Systems - General ROS: negative for - weight loss  Psychological ROS: negative for - behavioral disorder  Ophthalmic ROS: negative for - dry eyes  ENT ROS: negative for - vertigo or vocal changes  Hematological and Lymphatic ROS: negative for - jaundice or swollen lymph nodes  Respiratory ROS: negative for - sputum changes or stridor  Cardiovascular ROS: negative for - irregular heartbeat or murmur  Gastrointestinal ROS: negative for - blood in stools or change in stools  Genito-Urinary ROS: negative for - hematuria or incontinence  Musculoskeletal ROS: negative for - gait disturbance      History  Past Medical History:   Diagnosis Date   • Anxiety    • Chronic laryngitis     HISTORY OF   • Depression    • Endometriosis    • Fatigue    • GERD (gastroesophageal reflux disease)     full history in HPI.  SEVERE, on H2 blocker only due ot insurance not covering PPI.  Loses employment opportunities due to hoarseness from reflux.    • Hyperlipidemia    • Hypertension    • Kidney stones    • Migraine    • Prediabetes     HISTORY OF   • Sleep apnea     does not use CPAP due to intolerance   • Spinal headache      Past Surgical History:   Procedure Laterality Date   • ABDOMINAL SURGERY     • CARDIOVASCULAR STRESS TEST  2017   •  SECTION PRIMARY      done under general anesthesia b/c couldn't get epidural   •  SECTION WITH TUBAL     • DIAGNOSTIC LAPAROSCOPY      looking for endometriosis   • ECHO - CONVERTED  2017   • ENDOMETRIAL FULGURATION     • ENDOSCOPY  2017    in Dr. Beck Gongora   • TUBAL ABDOMINAL LIGATION       Family History   Problem Relation Age of Onset   • No Known Problems Mother    • No Known Problems Father    • Obesity Sister    • Hypertension Sister      Social History     Tobacco Use   • Smoking status: Current Some Day Smoker     Packs/day: 0.50      Years: 12.00     Pack years: 6.00     Types: Cigarettes   • Smokeless tobacco: Never Used   • Tobacco comment: I not around secondhand smoke in house or car unless visits certain family members   Substance Use Topics   • Alcohol use: Yes     Comment: occasional   • Drug use: No     Medications Prior to Admission   Medication Sig Dispense Refill Last Dose   • budesonide-formoterol (SYMBICORT) 160-4.5 MCG/ACT inhaler Inhale 2 puffs 2 (Two) Times a Day As Needed.   More than a month at Unknown time   • ibuprofen (ADVIL,MOTRIN) 800 MG tablet Take 1 tablet by mouth Every 6 (Six) Hours As Needed for Mild Pain . 25 tablet 0 12/11/2018   • lisinopril-hydrochlorothiazide (PRINZIDE,ZESTORETIC) 10-12.5 MG per tablet Take 1 tablet by mouth Daily.   12/12/2018     Allergies:  Patient has no known allergies.    Objective     Vital Signs  Temp:  [98.3 °F (36.8 °C)-100.7 °F (38.2 °C)] 99.5 °F (37.5 °C)  Heart Rate:  [76-95] 95  Resp:  [18-20] 20  BP: (111-135)/(62-76) 124/71    Physical Exam  General:  This is a WD WN female in no acute distress  Vital signs stable, afebrile  HEENT exam:  WNL. Sclera are anicteric.  EOMI  Neck:  supple, FROM.  No JVD.  Trachea midline  Lungs:  Respiratory effort normal. Auscultation: Clear, without wheezes, rhonchi, rales  Heart:  Regular rate and rhythm, without murmur, gallop, rub.  No pedal edema  Abdomen: Bowel sounds absent.  There is tenderness in the right side of the abdomen with guarding.  Musculoskeletal:  muscle strength/tone is normal.    Psyc:  alert, oriented x 3.  Mood and affect are appropriate  skin:  Warm with good turgor.  Without rash or lesion  extremities:  Examination of the extremities revealed no cyanosis, clubbing or edema.    Results Review:       Results from last 7 days   Lab Units  12/14/18   1210   WBC 10*3/mm3  16.62*   HEMOGLOBIN g/dL  13.2   HEMATOCRIT %  41.4   PLATELETS 10*3/mm3  334         Results from last 7 days   Lab Units  12/14/18   1210   SODIUM mmol/L   141   POTASSIUM mmol/L  4.1   CHLORIDE mmol/L  108   CO2 mmol/L  28.3   BUN mg/dL  13   CREATININE mg/dL  0.84   EGFR IF NONAFRICN AM mL/min/1.73  76   CALCIUM mg/dL  9.4   GLUCOSE mg/dL  117*   ALBUMIN g/dL  4.60   BILIRUBIN mg/dL  1.2   ALK PHOS U/L  106*   AST (SGOT) U/L  15   ALT (SGPT) U/L  15   Estimated Creatinine Clearance: 91.9 mL/min (by C-G formula based on SCr of 0.84 mg/dL).  No results found for: AMMONIA                    Imaging:  Imaging Results (last 24 hours)     Procedure Component Value Units Date/Time    CT Abdomen Pelvis With Contrast [[id number]] Collected:  12/14/18 1436     Updated:  12/14/18 1439    Narrative:       EXAM: CT ABDOMEN PELVIS W CONTRAST-              TECHNIQUE: Multiple axial CT images were obtained from lung bases  through pubic symphysis WITH administration of IV contrast. Reformatted  images in the coronal and/or sagittal plane(s) were generated from the  axial data set to facilitate diagnostic accuracy and/or surgical  planning.  Oral Contrast:NONE.     Radiation dose reduction techniques were utilized per ALARA protocol.  Automated exposure control was initiated through either or [email or web address] or  DoseRight software packages by  protocol.       DOSE: 1316.66534 mGy.cm     Clinical information  Abdominal pain, unspecified      Comparison  NONE.     Findings  LOWER THORAX: Clear. No effusions.     ABDOMEN:        LIVER: Homogeneous. No focal hepatic mass or ductal dilatation.        GALLBLADDER: No dilation or stone identified.        PANCREAS: Unremarkable. No mass or ductal dilatation.        SPLEEN: Homogeneous. No splenomegaly.        ADRENALS: No mass.        KIDNEYS/URETERS: No mass. No obstructive uropathy.  No evidence of  urolithiasis.        GI TRACT: Non-dilated. No definite wall thickening.        PERITONEUM: FREE FLUID LOWER PELVIS.        MESENTERY: Unremarkable.        LYMPH NODES: No lymphadenopathy.        VASCULATURE: No evidence of aneurysm.         ABDOMINAL WALL: No focal hernia or mass.           OTHER: None.     PELVIS:        BLADDER: No focal mass or significant wall thickening        REPRODUCTIVE: HYSTERECTOMY.        APPENDIX: ACUTE APPENDICITIS IS NOTED WITH DILATED FLUID-FILLED  APPENDIX, LARGE APPENDICOLITH NEAR THE APPENDIX BASE, AND SURROUNDING  INFLAMMATION.     BONES: No acute bony abnormality.       Impression:       Impression:  ACUTE APPENDICITIS AS DESCRIBED ABOVE. NO ABSCESS OR DEFINITE EVIDENCE  OF RUPTURE.        This report was finalized on 12/14/2018 2:37 PM by Dr. Brayan Jeff MD.             CT report and images were reviewed and I with the assessment      Impression:  Patient Active Problem List   Diagnosis Code   • Leiomyoma D21.9   • Encounter for consultation Z71.9   • Tobacco use Z72.0   • Hoarseness R49.0   • Preoperative clearance Z01.818   • Obesity, Class III, BMI 40-49.9 (morbid obesity) (CMS/HCC) E66.01   • Essential hypertension I10   • Mixed hyperlipidemia E78.2   • Abnormal EKG R94.31   • SOB (shortness of breath) R06.02   • Gastroesophageal reflux disease with esophagitis K21.0   • Gastroesophageal reflux disease K21.9   • Hand pain, right M79.641   • Kidney stone N20.0   • Appendicitis K37       Plan:  Laparoscopic appendectomy, possible open      Discussion:    Errors end dictation may reflect use of voice recognition software and not all errors in transcription may have been detected prior to signing.  Augustina Ayala MD  12/14/18  6:32 PM        Electronically signed by Augustina Ayala MD at 12/14/2018  6:35 PM          Emergency Department Notes      Avril Torres at 12/14/2018  2:40 PM        Paged surgery for Dr. Quiroz.     Avril Torres  12/14/18 1440      Electronically signed by Avril Torres at 12/14/2018  2:40 PM     Avril Torres at 12/14/2018  2:41 PM        Dr. Ayala returned phone call     Avril Torres  12/14/18 1449      Electronically signed by Avril Torres at 12/14/2018  2:41 PM      Alo Quiroz Jr., MD at 12/14/2018  2:43 PM          Subjective   Past medical history of kidney stones, GERD, depression, Nissen procedure. Presenting with generalized abdominal pain since Monday that migrated to right lower quadrant yesterday.  Was supposed to get a CT abdomen and pelvis outpatient but was unable tolerate the by mouth contrast.  Relating anorexia with nausea and some emesis.  Had normal bowel movement yesterday.  Has chills at home but no active fever.  Denies any chest pain, shortness breath, back pain,  symptoms, near/syncope.        Abdominal Pain   Pain location:  RLQ  Pain quality: aching    Pain radiates to:  Does not radiate  Pain severity:  Severe  Onset quality:  Gradual  Duration:  4 days  Timing:  Constant  Progression:  Worsening  Context: not alcohol use, not awakening from sleep, not diet changes, not medication withdrawal, not previous surgeries, not recent illness, not retching, not sick contacts and not suspicious food intake    Associated symptoms: nausea and vomiting    Associated symptoms: no anorexia, no belching, no chest pain, no chills, no dysuria, no fatigue, no fever, no flatus, no melena and no shortness of breath        Review of Systems   Constitutional: Negative.  Negative for chills, fatigue and fever.   HENT: Negative.    Eyes: Negative.    Respiratory: Negative.  Negative for shortness of breath.    Cardiovascular: Negative.  Negative for chest pain.   Gastrointestinal: Positive for abdominal pain, nausea and vomiting. Negative for anorexia, flatus and melena.   Endocrine: Negative.    Genitourinary: Negative.  Negative for dysuria.   Musculoskeletal: Negative.  Negative for arthralgias and back pain.   Skin: Negative.    Neurological: Negative.    Hematological: Negative.    Psychiatric/Behavioral: Negative.        Past Medical History:   Diagnosis Date   • Anxiety    • Chronic laryngitis     HISTORY OF   • Depression    • Fatigue    • GERD  (gastroesophageal reflux disease)     full history in HPI.  SEVERE, on H2 blocker only due ot insurance not covering PPI.  Loses employment opportunities due to hoarseness from reflux.    • Hyperlipidemia    • Hypertension    • Kidney stones    • Migraine    • Prediabetes     HISTORY OF   • Sleep apnea     does not use CPAP due to intolerance   • Spinal headache        No Known Allergies    Past Surgical History:   Procedure Laterality Date   • ABDOMINAL SURGERY     • CARDIOVASCULAR STRESS TEST  2017   •  SECTION PRIMARY      done under general anesthesia b/c couldn't get epidural   •  SECTION WITH TUBAL     • DIAGNOSTIC LAPAROSCOPY      looking for endometriosis   • ECHO - CONVERTED  2017   • ENDOMETRIAL FULGURATION     • ENDOSCOPY  2017    in Dr. Beck Gongora   • TUBAL ABDOMINAL LIGATION         Family History   Problem Relation Age of Onset   • No Known Problems Mother    • No Known Problems Father    • Obesity Sister    • Hypertension Sister        Social History     Socioeconomic History   • Marital status:      Spouse name: Heath Garsia   • Number of children: 2   • Years of education: high School [employer]    • Highest education level: Not on file   Occupational History   • Occupation: Unemployed    Tobacco Use   • Smoking status: Current Some Day Smoker     Packs/day: 0.50     Years: 12.00     Pack years: 6.00     Types: Cigarettes   • Smokeless tobacco: Never Used   • Tobacco comment: I not around secondhand smoke in house or car unless visits certain family members   Substance and Sexual Activity   • Alcohol use: Yes     Comment: occasional   • Drug use: No   • Sexual activity: Defer     Partners: Male     Birth control/protection: Surgical     Comment:     Social History Narrative    Lives with  and children.  Unemployed due to not being able to find work due to hoarse voice.  Denied disability due to hoarseness not that severe.             Objective    Physical Exam   Constitutional: She is oriented to person, place, and time. She appears well-developed and well-nourished.  Non-toxic appearance. She does not appear ill. No distress.   HENT:   Head: Normocephalic and atraumatic.   Eyes: EOM are normal. Pupils are equal, round, and reactive to light.   Cardiovascular: Normal rate, regular rhythm, normal heart sounds and intact distal pulses.   Pulmonary/Chest: Effort normal and breath sounds normal. No stridor. No respiratory distress. She has no wheezes. She has no rales.   Abdominal: Soft. Bowel sounds are normal. She exhibits no pulsatile liver and no ascites. There is no hepatosplenomegaly, splenomegaly or hepatomegaly. There is generalized tenderness and tenderness in the right lower quadrant. There is no rigidity, no rebound, no guarding, no CVA tenderness, no tenderness at McBurney's point and negative Martinez's sign.   Neurological: She is alert and oriented to person, place, and time.   Skin: Capillary refill takes less than 2 seconds.   Psychiatric: She has a normal mood and affect. Her behavior is normal.       Procedures          ED Course                  MDM  Number of Diagnoses or Management Options  Acute appendicitis with localized peritonitis, without perforation, abscess, or gangrene:   Diagnosis management comments: Acute appendicitis. Surgery called immediately. Pt remains calm and hemodynamically stable. Admission to OR.        Amount and/or Complexity of Data Reviewed  Clinical lab tests: ordered and reviewed  Tests in the radiology section of CPT®:  ordered and reviewed  Tests in the medicine section of CPT®:  ordered and reviewed  Discussion of test results with the performing providers: yes  Decide to obtain previous medical records or to obtain history from someone other than the patient: yes  Obtain history from someone other than the patient: yes  Review and summarize past medical records: yes  Discuss the patient with other providers:  yes  Independent visualization of images, tracings, or specimens: yes    Risk of Complications, Morbidity, and/or Mortality  Presenting problems: high  Diagnostic procedures: high  Management options: high    Patient Progress  Patient progress: stable        Final diagnoses:   None            Alo Quiroz Jr., MD  12/15/18 0721      Electronically signed by Alo Quiroz Jr., MD at 12/15/2018  7:21 AM       Hospital Medications (active)       Dose Frequency Start End    budesonide-formoterol (SYMBICORT) 160-4.5 MCG/ACT inhaler 2 puff 2 puff 2 Times Daily PRN 12/14/2018     Sig - Route: Inhale 2 puffs 2 (Two) Times a Day As Needed (sob). - Inhalation    docusate sodium (COLACE) capsule 100 mg 100 mg 2 Times Daily 12/14/2018     Sig - Route: Take 1 capsule by mouth 2 (Two) Times a Day. - Oral    fentaNYL citrate (PF) (SUBLIMAZE) injection 100 mcg 100 mcg Once 12/14/2018 12/14/2018    Sig - Route: Infuse 2 mL into a venous catheter 1 (One) Time. - Intravenous    heparin (porcine) 5000 UNIT/ML injection 5,000 Units 5,000 Units Every 12 Hours Scheduled 12/15/2018     Sig - Route: Inject 1 mL under the skin into the appropriate area as directed Every 12 (Twelve) Hours. - Subcutaneous    ibuprofen (ADVIL,MOTRIN) tablet 800 mg 800 mg Every 6 Hours PRN 12/14/2018     Sig - Route: Take 1 tablet by mouth Every 6 (Six) Hours As Needed for Mild Pain . - Oral    iopamidol (ISOVUE-300) 61 % injection 100 mL 100 mL Once in Imaging 12/14/2018 12/14/2018    Sig - Route: Infuse 100 mL into a venous catheter Once. - Intravenous    lactated ringers infusion 125 mL/hr Continuous 12/14/2018     Sig - Route: Infuse 125 mL/hr into a venous catheter Continuous. - Intravenous    lisinopril (PRINIVIL,ZESTRIL) 10 mg, hydrochlorothiazide (HYDRODIURIL) 12.5 mg for ZESTORETIC 10-12.5  Every 24 Hours Scheduled 12/15/2018     Sig - Route: Take  by mouth Daily. - Oral    morphine injection 4 mg 4 mg Every 4 Hours PRN 12/14/2018     Sig - Route:  Infuse 1 mL into a venous catheter Every 4 (Four) Hours As Needed for Severe Pain . - Intravenous    Morphine injection 8 mg 8 mg Once 12/14/2018 12/14/2018    Sig - Route: Infuse 0.8 mL into a venous catheter 1 (One) Time. - Intravenous    ondansetron (ZOFRAN) injection 4 mg 4 mg Once 12/14/2018 12/14/2018    Sig - Route: Infuse 2 mL into a venous catheter 1 (One) Time. - Intravenous    ondansetron (ZOFRAN) injection 4 mg 4 mg Every 4 Hours PRN 12/14/2018     Sig - Route: Infuse 2 mL into a venous catheter Every 4 (Four) Hours As Needed for Nausea or Vomiting. - Intravenous    oxyCODONE-acetaminophen (PERCOCET) 5-325 MG per tablet 1 tablet 1 tablet Every 4 Hours PRN 12/14/2018 12/24/2018    Sig - Route: Take 1 tablet by mouth Every 4 (Four) Hours As Needed for Moderate Pain . - Oral    pantoprazole (PROTONIX) EC tablet 40 mg 40 mg Every Early Morning 12/15/2018     Sig - Route: Take 1 tablet by mouth Every Morning. - Oral    piperacillin-tazobactam (ZOSYN) 3.375 g/100 mL 0.9% NS IVPB (mbp) 3.375 g Once 12/14/2018 12/14/2018    Sig - Route: Infuse 100 mL into a venous catheter 1 (One) Time. - Intravenous    piperacillin-tazobactam (ZOSYN) 3.375 g/100 mL 0.9% NS IVPB (mbp) 3.375 g Every 8 Hours 12/14/2018 12/21/2018    Sig - Route: Infuse 100 mL into a venous catheter Every 8 (Eight) Hours. - Intravenous    polyethylene glycol (MIRALAX) powder 17 g 17 g 2 Times Daily 12/14/2018 12/18/2018    Sig - Route: Take 17 g by mouth 2 (Two) Times a Day. - Oral    promethazine (PHENERGAN) 12.5 mg in sodium chloride 0.9 % 50 mL 12.5 mg Every 4 Hours PRN 12/14/2018     Sig - Route: Infuse 12.5 mg into a venous catheter Every 4 (Four) Hours As Needed for Nausea or Vomiting. - Intravenous    sodium chloride 0.9 % bolus 1,000 mL 1,000 mL Once 12/14/2018 12/14/2018    Sig - Route: Infuse 1,000 mL into a venous catheter 1 (One) Time. - Intravenous    sodium chloride 0.9 % flush 10 mL 10 mL As Needed 12/14/2018     Sig - Route: Infuse  "10 mL into a venous catheter As Needed for Line Care. - Intravenous    Linked Group 1:  \"And\" Linked Group Details        sodium chloride 0.9 % flush 3 mL 3 mL Every 12 Hours Scheduled 12/14/2018     Sig - Route: Infuse 3 mL into a venous catheter Every 12 (Twelve) Hours. - Intravenous    sodium chloride 0.9 % flush 3-10 mL 3-10 mL As Needed 12/14/2018     Sig - Route: Infuse 3-10 mL into a venous catheter As Needed for Line Care. - Intravenous    dexamethasone (DECADRON) injection (Discontinued)  As Needed 12/14/2018 12/14/2018    Sig - Route: Infuse  into a venous catheter As Needed. - Intravenous    Reason for Discontinue: Anesthesia Stop    famotidine (PEPCID) injection (Discontinued)  As Needed 12/14/2018 12/14/2018    Sig - Route: Infuse  into a venous catheter As Needed. - Intravenous    Reason for Discontinue: Anesthesia Stop    fentaNYL citrate (PF) (SUBLIMAZE) injection 50 mcg (Discontinued) 50 mcg Every 5 Minutes PRN 12/14/2018 12/14/2018    Sig - Route: Infuse 1 mL into a venous catheter Every 5 (Five) Minutes As Needed for Moderate Pain  or Severe Pain . - Intravenous    Reason for Discontinue: Patient Transfer    fentaNYL citrate (PF) (SUBLIMAZE) injection (Discontinued)  As Needed 12/14/2018 12/14/2018    Sig - Route: Infuse  into a venous catheter As Needed. - Intravenous    Reason for Discontinue: Anesthesia Stop    glycopyrrolate (ROBINUL) injection (Discontinued)  As Needed 12/14/2018 12/14/2018    Sig - Route: Infuse  into a venous catheter As Needed. - Intravenous    Reason for Discontinue: Anesthesia Stop    HYDROmorphone (DILAUDID) injection 0.5 mg (Discontinued) 0.5 mg Every 1 Hour PRN 12/14/2018 12/14/2018    Sig - Route: Infuse 0.25 mL into a venous catheter Every 1 (One) Hour As Needed for Moderate Pain  or Severe Pain  (may repeat X1). - Intravenous    Reason for Discontinue: Patient Transfer    ipratropium-albuterol (DUO-NEB) nebulizer solution 3 mL (Discontinued) 3 mL Once As Needed " 12/14/2018 12/14/2018    Sig - Route: Take 3 mL by nebulization 1 (One) Time As Needed for Wheezing or Shortness of Air (bronchospasm). - Nebulization    Reason for Discontinue: Patient Transfer    lactated ringers infusion (Discontinued) 100 mL/hr Continuous 12/14/2018 12/15/2018    Sig - Route: Infuse 100 mL/hr into a venous catheter Continuous. - Intravenous    lidocaine (XYLOCAINE) 2% injection (Discontinued)  As Needed 12/14/2018 12/14/2018    Sig: As Needed.    Reason for Discontinue: Anesthesia Stop    meperidine (DEMEROL) injection 12.5 mg (Discontinued) 12.5 mg Every 5 Minutes PRN 12/14/2018 12/14/2018    Sig - Route: Infuse 0.5 mL into a venous catheter Every 5 (Five) Minutes As Needed for Shivering (May repeat x 1). - Intravenous    Reason for Discontinue: Patient Transfer    midazolam (VERSED) injection (Discontinued)  As Needed 12/14/2018 12/14/2018    Sig - Route: Infuse  into a venous catheter As Needed. - Intravenous    Reason for Discontinue: Anesthesia Stop    neostigmine injection (Discontinued)  As Needed 12/14/2018 12/14/2018    Sig - Route: Infuse  into a venous catheter As Needed. - Intravenous    Reason for Discontinue: Anesthesia Stop    ondansetron (ZOFRAN) injection 4 mg (Discontinued) 4 mg Once As Needed 12/14/2018 12/14/2018    Sig - Route: Infuse 2 mL into a venous catheter 1 (One) Time As Needed for Nausea or Vomiting (may repeat times one dose). - Intravenous    Reason for Discontinue: Patient Transfer    piperacillin-tazobactam (ZOSYN) 3.375 g/100 mL 0.9% NS IVPB (mbp) (Discontinued) 3.375 g Every 6 Hours 12/14/2018 12/14/2018    Sig - Route: Infuse 100 mL into a venous catheter Every 6 (Six) Hours. - Intravenous    Reason for Discontinue: Duplicate order    Propofol (DIPRIVAN) injection (Discontinued)  As Needed 12/14/2018 12/14/2018    Sig - Route: Infuse  into a venous catheter As Needed. - Intravenous    Reason for Discontinue: Anesthesia Stop    rocuronium (ZEMURON) injection  (Discontinued)  As Needed 12/14/2018 12/14/2018    Sig - Route: Infuse  into a venous catheter As Needed. - Intravenous    Reason for Discontinue: Anesthesia Stop    sodium chloride (NS) irrigation solution (Discontinued)  As Needed 12/14/2018 12/14/2018    Sig: As Needed.    Reason for Discontinue: Patient Discharge    sodium chloride 0.9 % flush 3 mL (Discontinued) 3 mL Every 12 Hours Scheduled 12/14/2018 12/14/2018    Sig - Route: Infuse 3 mL into a venous catheter Every 12 (Twelve) Hours. - Intravenous    Reason for Discontinue: Patient Transfer    sodium chloride 0.9 % flush 3-10 mL (Discontinued) 3-10 mL As Needed 12/14/2018 12/14/2018    Sig - Route: Infuse 3-10 mL into a venous catheter As Needed for Line Care. - Intravenous    Reason for Discontinue: Patient Transfer    sodium chloride 0.9 % solution (Discontinued)  As Needed 12/14/2018 12/14/2018    Sig: As Needed.    Reason for Discontinue: Patient Discharge          Orders (last 24 hrs)     Start     Ordered    12/15/18 2330  heparin (porcine) 5000 UNIT/ML injection 5,000 Units  Every 12 Hours Scheduled      12/14/18 2222    12/15/18 1053  Diet Regular  Diet Effective Now      12/15/18 1052    12/15/18 0900  lisinopril (PRINIVIL,ZESTRIL) 10 mg, hydrochlorothiazide (HYDRODIURIL) 12.5 mg for ZESTORETIC 10-12.5  Every 24 Hours Scheduled      12/14/18 2056    12/15/18 0600  pantoprazole (PROTONIX) EC tablet 40 mg  Every Early Morning      12/14/18 2056    12/15/18 0000  Vital Signs  Every 4 Hours      12/14/18 2222 12/14/18 2315  sodium chloride 0.9 % flush 3 mL  Every 12 Hours Scheduled      12/14/18 2222 12/14/18 2315  lactated ringers infusion  Continuous,   Status:  Discontinued      12/14/18 2222 12/14/18 2300  piperacillin-tazobactam (ZOSYN) 3.375 g/100 mL 0.9% NS IVPB (mbp)  Every 8 Hours      12/14/18 2056 12/14/18 2300  polyethylene glycol (MIRALAX) powder 17 g  2 Times Daily      12/14/18 2056 12/14/18 2300  docusate sodium (COLACE)  capsule 100 mg  2 Times Daily      12/14/18 2056 12/14/18 2223  Code Status and Medical Interventions:  Continuous      12/14/18 2222 12/14/18 2223  Intake and Output  Every Shift      12/14/18 2222 12/14/18 2223  Place sequential compression device  Once      12/14/18 2222 12/14/18 2223  Change Dressing  Every Shift      12/14/18 2222 12/14/18 2223  Insert Peripheral IV  Once      12/14/18 2222 12/14/18 2223  Saline Lock & Maintain IV Access  Continuous      12/14/18 2222 12/14/18 2223  Place Sequential Compression Device  Once      12/14/18 2222 12/14/18 2223  Maintain Sequential Compression Device  Continuous      12/14/18 2222 12/14/18 2223  Ambulate Patient  Every Shift      12/14/18 2222 12/14/18 2223  Turn, Cough & Deep Breathe  Once      12/14/18 2222 12/14/18 2223  Strict Intake and Output  Every Shift      12/14/18 2222 12/14/18 2223  Notify physician (specify)  Until Discontinued      12/14/18 2222 12/14/18 2223  Incentive Spirometry  Every Hour While Awake      12/14/18 2222 12/14/18 2223  Diet Clear Liquid  Diet Effective Now,   Status:  Canceled      12/14/18 2222 12/14/18 2223  Antibiotic Previously Ordered  Once      12/14/18 2222 12/14/18 2222  sodium chloride 0.9 % flush 3-10 mL  As Needed      12/14/18 2222 12/14/18 2144  Oxygen Therapy- Nasal Cannula; Titrate for SPO2: equal to or greater than, 96%, per policy  Continuous      12/14/18 2143 12/14/18 2144  Pulse Oximetry, Continuous  Continuous      12/14/18 2143 12/14/18 2143  Vital signs every 5 minutes for 15 minutes, every 15 minutes thereafter.  Once,   Status:  Canceled      12/14/18 2142 12/14/18 2143  Call Anesthesiologist for additional IV Fluid bolus for Hypotension/Tachycardia  Until Discontinued,   Status:  Canceled      12/14/18 2142 12/14/18 2143  Notify Anesthesia of Any Acute Changes in Patient Condition  Until Discontinued,   Status:  Canceled      12/14/18 2142     12/14/18 2143  Notify Anesthesia for Unrelieved Pain  Until Discontinued,   Status:  Canceled      12/14/18 2142 12/14/18 2143  POC Glucose STAT  STAT,   Status:  Canceled     Comments:  Notify Anesthesia if blood sugar is less than 80 mg/dL or greater than 180mg/dL      12/14/18 2142 12/14/18 2143  Once DC criteria to floor met, follow surgeon's orders.  Until Discontinued,   Status:  Canceled      12/14/18 2142 12/14/18 2143  Discharge patient from PACU when discharge criteria is met.  Until Discontinued,   Status:  Canceled      12/14/18 2142 12/14/18 2142  fentaNYL citrate (PF) (SUBLIMAZE) injection 50 mcg  Every 5 Minutes PRN,   Status:  Discontinued      12/14/18 2142 12/14/18 2142  ipratropium-albuterol (DUO-NEB) nebulizer solution 3 mL  Once As Needed,   Status:  Discontinued      12/14/18 2142 12/14/18 2142  ondansetron (ZOFRAN) injection 4 mg  Once As Needed,   Status:  Discontinued      12/14/18 2142 12/14/18 2142  meperidine (DEMEROL) injection 12.5 mg  Every 5 Minutes PRN,   Status:  Discontinued      12/14/18 2142 12/14/18 2118  Surgical Pathology Exam      12/14/18 2118 12/14/18 2118  sodium chloride (NS) irrigation solution  As Needed,   Status:  Discontinued      12/14/18 2118 12/14/18 2118  sodium chloride 0.9 % solution  As Needed,   Status:  Discontinued      12/14/18 2119 12/14/18 2100  sodium chloride 0.9 % flush 3 mL  Every 12 Hours Scheduled,   Status:  Discontinued      12/14/18 1525    12/14/18 2100  piperacillin-tazobactam (ZOSYN) 3.375 g/100 mL 0.9% NS IVPB (mbp)  Every 6 Hours,   Status:  Discontinued      12/14/18 1924 12/14/18 2056  heplock IV when taking po well  Nursing Communication  Until Discontinued     Comments:  heplock IV when taking po well    12/14/18 2056 12/14/18 2055  morphine injection 4 mg  Every 4 Hours PRN      12/14/18 2056 12/14/18 2055  oxyCODONE-acetaminophen (PERCOCET) 5-325 MG per tablet 1 tablet  Every 4 Hours PRN       12/14/18 2056 12/14/18 2055  promethazine (PHENERGAN) 12.5 mg in sodium chloride 0.9 % 50 mL  Every 4 Hours PRN      12/14/18 2056 12/14/18 2055  ondansetron (ZOFRAN) injection 4 mg  Every 4 Hours PRN      12/14/18 2056 12/14/18 2053  ibuprofen (ADVIL,MOTRIN) tablet 800 mg  Every 6 Hours PRN      12/14/18 2056 12/14/18 2052  budesonide-formoterol (SYMBICORT) 160-4.5 MCG/ACT inhaler 2 puff  2 Times Daily PRN      12/14/18 2056 12/14/18 1819  HYDROmorphone (DILAUDID) injection 0.5 mg  Every 1 Hour PRN,   Status:  Discontinued      12/14/18 1820    12/14/18 1641  fentaNYL citrate (PF) (SUBLIMAZE) injection 100 mcg  Once      12/14/18 1639    12/14/18 1544  Inpatient Admission  Once      12/14/18 1544    12/14/18 1527  lactated ringers infusion  Continuous      12/14/18 1525    12/14/18 1526  Oxygen Therapy- Nasal Cannula; Titrate for SPO2: equal to or greater than, 90%  Continuous      12/14/18 1525    12/14/18 1526  POC Glucose Once  Once,   Status:  Canceled     Comments:  For all diabetic patients and all patients who are to be admitted. Notify Anesthesiologist for blood sugar > 180.      12/14/18 1525    12/14/18 1526  POC Urine Pregnancy Test  Once,   Status:  Canceled      12/14/18 1525    12/14/18 1526  Insert Peripheral IV  Once,   Status:  Canceled      12/14/18 1525    12/14/18 1526  Saline Lock & Maintain IV Access  Continuous,   Status:  Canceled      12/14/18 1525    12/14/18 1526  May take Beta Blocker from home with sip of water.  Once,   Status:  Canceled      12/14/18 1525    12/14/18 1525  Pulse Oximetry, Continuous  As Needed,   Status:  Canceled      12/14/18 1525    12/14/18 1525  sodium chloride 0.9 % flush 3-10 mL  As Needed,   Status:  Discontinued      12/14/18 1525    12/14/18 1525  Vital Signs - Per Anesthesia Protocol  As Needed,   Status:  Canceled      12/14/18 1525    12/14/18 1451  Inpatient General Surgery Consult  STAT     Specialty:  General Surgery  Provider:  Jamie  Augustina CAMPOS MD    12/14/18 1451    12/14/18 1451  Send To OR  Once      12/14/18 1451    12/14/18 1451  Surgery (on-call MD unless specified)  Once     Specialty:  General Surgery  Provider:  Augustina Ayala MD    12/14/18 1451    12/14/18 1445  Case Request  Once      12/14/18 1444    12/14/18 1445  NPO Diet NPO Except: Sips With Meds  Diet Effective Midnight,   Status:  Canceled      12/14/18 1444    12/14/18 1445  Place Sequential Compression Device  Once     Comments:  Surgery pre-op      12/14/18 1444    12/14/18 1445  Verify Informed Consent  Once      12/14/18 1444    12/14/18 1444  piperacillin-tazobactam (ZOSYN) 3.375 g/100 mL 0.9% NS IVPB (mbp)  Once      12/14/18 1442    12/14/18 1444  sodium chloride 0.9 % bolus 1,000 mL  Once      12/14/18 1442    12/14/18 1431  iopamidol (ISOVUE-300) 61 % injection 100 mL  Once in Imaging      12/14/18 1429    12/14/18 1404  ondansetron (ZOFRAN) injection 4 mg  Once      12/14/18 1402    12/14/18 1403  Morphine injection 8 mg  Once      12/14/18 1402    12/14/18 1403  Insert peripheral IV  Once      12/14/18 1402    12/14/18 1402  sodium chloride 0.9 % flush 10 mL  As Needed      12/14/18 1402    12/14/18 1402  CT Abdomen Pelvis With Contrast  1 Time Imaging      12/14/18 1402    12/14/18 1300  Osmolality, Calculated  Once      12/14/18 1259             Operative/Procedure Notes (last 24 hours) (Notes from 12/14/2018 12:09 PM through 12/15/2018 12:09 PM)      Augustina Ayala MD at 12/14/2018  8:53 PM          Laparoscopic Appendectomy Procedure Note    Surgeon:  Augustina Ayala M.D., F.A.C.S.    Assistant;  Jose Luis Garsia  12/14/2018    Pre-op Diagnosis:   appendicitis    Post-op Diagnosis:     Appendicitis, perforated without abscess    Anesthesia:  general    Indication:  appendicitis    Procedure:  After obtaining informed consent and receiving preoperative antibiotic and with venous compression boots in place patient was taken to the operating  room and placed in the supine position.  After administration of general endotracheal anesthesia the abdomen was prepped and draped in a sterile fashion.  An incision was made above the umbilicus and the Veress needle was inserted.  Pneumoperitoneum was obtained to 15 mmHg and the 12 mm trocar was placed.  Camera was inserted, confirming position within the abdomen.  The patient was then placed in Trendelenburg right side up and under direct visualization a left lower quadrant and suprapubic 5 mm trocar was placed.  The appendix was identified and noted to be acutely inflamed.  It was walled off against the anterior abdominal wall and when it was freed from the abdominal wall the proximal phlebolith noted on the CT was noted to be lying in the pelvic sidewall consistent with appendiceal perforation without abscess.  It was freed from the surrounding structures.  A window was created at the base of the appendix and the appendix was then transected with the non-vascular Endo ESPERANZA stapler.  The stapler was able to be passed proximal to the site of perforation.  The mesentery was transected with the vascular Endo ESPERANZA stapler.  The appendix was placed within the Endo Catch.  The abdomen was cauterized and hemostasis was obtained.  The appendix was then removed through the umbilical port.  The umbilical fascia was closed with 0 Vicryl sutures.  Incisions were closed with 4.0 Vicryl subcuticular sutures. Dressing was placed.    Patient tolerated the procedure well, was extubated in the operating room and taken to the recovery room in stable condition    Staff:   Circulator: Julián Montoya RN  Scrub Person: Estefania Cueva  Assistant: Jose Luis Marti        Estimated Blood Loss: minimal    Blood administered:  none    Specimens:                  Order Name Source Comment Collection Info Order Time   SURGICAL PATHOLOGY EXAM Large Intestine, Appendix  Collected By: Augustina Ayala MD 12/14/2018  9:18 PM    Collection Date   12/14/2018       Collection Time   9:18 PM          Grafts and Implants: None  Drains: none    Findings: Acute appendicitis with perforation secondary to phlebolith    Complications: none      Augustina Ayala MD     Date: 12/15/2018  Time: 10:53 AM      Electronically signed by Augustina Ayala MD at 12/15/2018 10:55 AM          Physician Progress Notes (last 24 hours) (Notes from 12/14/2018 12:09 PM through 12/15/2018 12:09 PM)      Augustina Ayala MD at 12/15/2018 10:55 AM               LOS: 1 day   Patient Care Team:  Kathy Rhodes APRN as PCP - General  Kathy Rhodes APRN as PCP - Family Medicine  Belgica Huston MD as Surgeon (General Surgery)  Henok Valles MD as Consulting Physician (Cardiology)    Post op day # 1, status post laparoscopic appendectomy    Subjective     Interval History:  Patient is up sitting in the chair.  She states she is voiding and has ambulated outside the room.  She is tolerating the clear liquids without vomiting although it did require some Zofran this morning.     History taken from patient.      Objective     Vital Signs  Temp:  [98.1 °F (36.7 °C)-100.7 °F (38.2 °C)] 98.6 °F (37 °C)  Heart Rate:  [] 102  Resp:  [16-22] 20  BP: ()/(55-84) 103/69    Physical Exam:  This is a well-developed well-nourished  overweight female in no acute distress  HEENT examination: Sclera are anicteric  Lungs: Clear  Abdomen: Hypoactive bowel sounds.  Expected incisional tenderness  Skin/incisions: Incision sites were inspected and dressings dry   Results Review:        Results from last 7 days   Lab Units  12/14/18   1210   WBC 10*3/mm3  16.62*   HEMOGLOBIN g/dL  13.2   HEMATOCRIT %  41.4   PLATELETS 10*3/mm3  334         Results from last 7 days   Lab Units  12/14/18   1210   SODIUM mmol/L  141   POTASSIUM mmol/L  4.1   CHLORIDE mmol/L  108   CO2 mmol/L  28.3   BUN mg/dL  13   CREATININE mg/dL  0.84   EGFR IF NONAFRICN AM mL/min/1.73  76   CALCIUM mg/dL   9.4   GLUCOSE mg/dL  117*   ALBUMIN g/dL  4.60   BILIRUBIN mg/dL  1.2   ALK PHOS U/L  106*   AST (SGOT) U/L  15   ALT (SGPT) U/L  15   Estimated Creatinine Clearance: 91.9 mL/min (by C-G formula based on SCr of 0.84 mg/dL).  No results found for: AMMONIA         Imaging:  Imaging Results (last 24 hours)     Procedure Component Value Units Date/Time    CT Abdomen Pelvis With Contrast [[id number]] Collected:  12/14/18 1436     Updated:  12/14/18 1439    Narrative:       EXAM: CT ABDOMEN PELVIS W CONTRAST-              TECHNIQUE: Multiple axial CT images were obtained from lung bases  through pubic symphysis WITH administration of IV contrast. Reformatted  images in the coronal and/or sagittal plane(s) were generated from the  axial data set to facilitate diagnostic accuracy and/or surgical  planning.  Oral Contrast:NONE.     Radiation dose reduction techniques were utilized per ALARA protocol.  Automated exposure control was initiated through either or [employer] or  DoseRight software packages by  protocol.       DOSE: 1316.92640 mGy.cm     Clinical information  Abdominal pain, unspecified      Comparison  NONE.     Findings  LOWER THORAX: Clear. No effusions.     ABDOMEN:        LIVER: Homogeneous. No focal hepatic mass or ductal dilatation.        GALLBLADDER: No dilation or stone identified.        PANCREAS: Unremarkable. No mass or ductal dilatation.        SPLEEN: Homogeneous. No splenomegaly.        ADRENALS: No mass.        KIDNEYS/URETERS: No mass. No obstructive uropathy.  No evidence of  urolithiasis.        GI TRACT: Non-dilated. No definite wall thickening.        PERITONEUM: FREE FLUID LOWER PELVIS.        MESENTERY: Unremarkable.        LYMPH NODES: No lymphadenopathy.        VASCULATURE: No evidence of aneurysm.        ABDOMINAL WALL: No focal hernia or mass.           OTHER: None.     PELVIS:        BLADDER: No focal mass or significant wall thickening        REPRODUCTIVE: HYSTERECTOMY.         APPENDIX: ACUTE APPENDICITIS IS NOTED WITH DILATED FLUID-FILLED  APPENDIX, LARGE APPENDICOLITH NEAR THE APPENDIX BASE, AND SURROUNDING  INFLAMMATION.     BONES: No acute bony abnormality.       Impression:       Impression:  ACUTE APPENDICITIS AS DESCRIBED ABOVE. NO ABSCESS OR DEFINITE EVIDENCE  OF RUPTURE.        This report was finalized on 12/14/2018 2:37 PM by Dr. Brayan Jeff MD.              Impression:  Stable course, status post laparoscopic appendectomy for acute appendicitis with perforation    Plan:  Continue antibiotics  Advanced to regular diet    Errors end dictation may reflect use of voice recognition software and not all errors in transcription may have been detected prior to signing.  Augustina Ayala MD  12/15/18  10:56 AM          Electronically signed by Augustina Ayala MD at 12/15/2018 10:57 AM

## 2018-12-15 NOTE — ANESTHESIA PROCEDURE NOTES
Airway  Urgency: elective    Date/Time: 12/14/2018 8:55 PM  Airway not difficult    General Information and Staff    Patient location during procedure: OR  Anesthesiologist: Noah Ely MD  CRNA: Feroz Hogue CRNA    Indications and Patient Condition  Indications for airway management: airway protection    Preoxygenated: yes  MILS maintained throughout  Mask difficulty assessment: 0 - not attempted    Final Airway Details  Final airway type: endotracheal airway      Successful airway: ETT  Cuffed: yes   Successful intubation technique: direct laryngoscopy  Facilitating devices/methods: intubating stylet  Endotracheal tube insertion site: oral  Blade: Ranjana  Blade size: 3  ETT size (mm): 7.0  Cormack-Lehane Classification: grade IIa - partial view of glottis  Placement verified by: chest auscultation, capnometry and palpation of cuff   Cuff volume (mL): 10  Measured from: lips  ETT to lips (cm): 21  Number of attempts at approach: 1    Additional Comments  Dentition and lips same as preop

## 2018-12-15 NOTE — OP NOTE
Laparoscopic Appendectomy Procedure Note    Surgeon:  Augusitna Ayala M.D., [place]    Assistant;  Jose Luis Garsia  12/14/2018    Pre-op Diagnosis:   appendicitis    Post-op Diagnosis:     Appendicitis, perforated without abscess    Anesthesia:  general    Indication:  appendicitis    Procedure:  After obtaining informed consent and receiving preoperative antibiotic and with venous compression boots in place patient was taken to the operating room and placed in the supine position.  After administration of general endotracheal anesthesia the abdomen was prepped and draped in a sterile fashion.  An incision was made above the umbilicus and the Veress needle was inserted.  Pneumoperitoneum was obtained to 15 mmHg and the 12 mm trocar was placed.  Camera was inserted, confirming position within the abdomen.  The patient was then placed in Trendelenburg right side up and under direct visualization a left lower quadrant and suprapubic 5 mm trocar was placed.  The appendix was identified and noted to be acutely inflamed.  It was walled off against the anterior abdominal wall and when it was freed from the abdominal wall the proximal phlebolith noted on the CT was noted to be lying in the pelvic sidewall consistent with appendiceal perforation without abscess.  It was freed from the surrounding structures.  A window was created at the base of the appendix and the appendix was then transected with the non-vascular Endo ESPERANZA stapler.  The stapler was able to be passed proximal to the site of perforation.  The mesentery was transected with the vascular Endo ESPERANZA stapler.  The appendix was placed within the Endo Catch.  The abdomen was cauterized and hemostasis was obtained.  The appendix was then removed through the umbilical port.  The umbilical fascia was closed with 0 Vicryl sutures.  Incisions were closed with 4.0 Vicryl subcuticular sutures. Dressing was placed.    Patient tolerated the procedure well, was  extubated in the operating room and taken to the recovery room in stable condition    Staff:   Circulator: Julián Montoya RN  Scrub Person: Estefania Cueva  Assistant: Jose Luis Marti        Estimated Blood Loss: minimal    Blood administered:  none    Specimens:                  Order Name Source Comment Collection Info Order Time   SURGICAL PATHOLOGY EXAM Large Intestine, Appendix  Collected By: Augustina Ayala MD 12/14/2018  9:18 PM    Collection Date  12/14/2018       Collection Time   9:18 PM          Grafts and Implants: None  Drains: none    Findings: Acute appendicitis with perforation secondary to phlebolith    Complications: none      Augustina Ayala MD     Date: 12/15/2018  Time: 10:53 AM

## 2018-12-15 NOTE — ANESTHESIA POSTPROCEDURE EVALUATION
Patient: Yahir Garsia    Procedure Summary     Date:  12/14/18 Room / Location:  [facility] OR  /  [facility] OR    Anesthesia Start:  2048 Anesthesia Stop:  2134    Procedure:  APPENDECTOMY LAPAROSCOPIC (N/A Abdomen) Diagnosis:       Appendicitis, unspecified appendicitis type      (Appendicitis, unspecified appendicitis type [K37])    Surgeon:  Augustina Ayala MD Provider:  Noah Ely MD    Anesthesia Type:  general ASA Status:  3          Anesthesia Type: general  Last vitals  BP   109/71 (12/14/18 2151)   Temp   99.4 °F (37.4 °C) (12/14/18 2136)   Pulse   86 (12/14/18 2151)   Resp   20 (12/14/18 2151)     SpO2   99 % (12/14/18 2151)     Post Anesthesia Care and Evaluation    Patient location during evaluation: PACU  Patient participation: complete - patient participated  Level of consciousness: awake and alert  Pain score: 1  Pain management: adequate  Airway patency: patent  Anesthetic complications: No anesthetic complications  PONV Status: controlled  Cardiovascular status: acceptable  Respiratory status: acceptable  Hydration status: acceptable

## 2018-12-15 NOTE — PLAN OF CARE
Problem: Patient Care Overview  Goal: Plan of Care Review   12/15/18 0054   Coping/Psychosocial   Plan of Care Reviewed With patient   Plan of Care Review   Progress no change       Problem: Skin Injury Risk (Adult)  Goal: Identify Related Risk Factors and Signs and Symptoms   12/15/18 0054   Skin Injury Risk (Adult)   Related Risk Factors (Skin Injury Risk) infection;mobility impaired     Goal: Skin Health and Integrity   12/15/18 0054   Skin Injury Risk (Adult)   Skin Health and Integrity making progress toward outcome       Problem: Infection, Risk/Actual (Adult)  Goal: Identify Related Risk Factors and Signs and Symptoms   12/15/18 0054   Infection, Risk/Actual (Adult)   Related Risk Factors (Infection, Risk/Actual) skin integrity impairment;surgery/procedure   Signs and Symptoms (Infection, Risk/Actual) weakness     Goal: Infection Prevention/Resolution   12/15/18 0054   Infection, Risk/Actual (Adult)   Infection Prevention/Resolution making progress toward outcome       Problem: Fall Risk (Adult)  Goal: Identify Related Risk Factors and Signs and Symptoms   12/15/18 0054   Fall Risk (Adult)   Related Risk Factors (Fall Risk) fatigue/slow reaction;environment unfamiliar;slippery/uneven surfaces   Signs and Symptoms (Fall Risk) presence of risk factors     Goal: Absence of Fall   12/15/18 0054   Fall Risk (Adult)   Absence of Fall making progress toward outcome

## 2018-12-16 VITALS
BODY MASS INDEX: 36.82 KG/M2 | DIASTOLIC BLOOD PRESSURE: 64 MMHG | TEMPERATURE: 97.6 F | OXYGEN SATURATION: 94 % | HEART RATE: 80 BPM | SYSTOLIC BLOOD PRESSURE: 112 MMHG | RESPIRATION RATE: 20 BRPM | WEIGHT: 195 LBS | HEIGHT: 61 IN

## 2018-12-16 PROCEDURE — 25010000002 PIPERACILLIN-TAZOBACTAM: Performed by: SURGERY

## 2018-12-16 PROCEDURE — 25010000002 PROMETHAZINE PER 50 MG: Performed by: SURGERY

## 2018-12-16 PROCEDURE — 25010000002 MORPHINE PER 10 MG: Performed by: SURGERY

## 2018-12-16 PROCEDURE — 99024 POSTOP FOLLOW-UP VISIT: CPT | Performed by: SURGERY

## 2018-12-16 PROCEDURE — 94799 UNLISTED PULMONARY SVC/PX: CPT

## 2018-12-16 PROCEDURE — 25010000002 ONDANSETRON PER 1 MG: Performed by: SURGERY

## 2018-12-16 PROCEDURE — 25010000002 HEPARIN (PORCINE) PER 1000 UNITS: Performed by: SURGERY

## 2018-12-16 RX ORDER — HYDROCODONE BITARTRATE AND ACETAMINOPHEN 7.5; 325 MG/1; MG/1
1 TABLET ORAL 4 TIMES DAILY PRN
Qty: 20 TABLET | Refills: 0 | Status: ON HOLD | OUTPATIENT
Start: 2018-12-16 | End: 2019-06-10

## 2018-12-16 RX ORDER — ONDANSETRON 4 MG/1
4 TABLET, ORALLY DISINTEGRATING ORAL EVERY 8 HOURS PRN
Qty: 15 TABLET | Refills: 0 | Status: ON HOLD | OUTPATIENT
Start: 2018-12-16 | End: 2019-06-10

## 2018-12-16 RX ORDER — AMOXICILLIN AND CLAVULANATE POTASSIUM 875; 125 MG/1; MG/1
1 TABLET, FILM COATED ORAL 2 TIMES DAILY
Qty: 14 TABLET | Refills: 0 | Status: SHIPPED | OUTPATIENT
Start: 2018-12-16 | End: 2018-12-23

## 2018-12-16 RX ADMIN — ONDANSETRON 4 MG: 2 INJECTION, SOLUTION INTRAMUSCULAR; INTRAVENOUS at 11:06

## 2018-12-16 RX ADMIN — SODIUM CHLORIDE, POTASSIUM CHLORIDE, SODIUM LACTATE AND CALCIUM CHLORIDE 125 ML/HR: 600; 310; 30; 20 INJECTION, SOLUTION INTRAVENOUS at 05:52

## 2018-12-16 RX ADMIN — HEPARIN SODIUM 5000 UNITS: 5000 INJECTION, SOLUTION INTRAVENOUS; SUBCUTANEOUS at 08:45

## 2018-12-16 RX ADMIN — PROMETHAZINE HYDROCHLORIDE 12.5 MG: 25 INJECTION INTRAMUSCULAR; INTRAVENOUS at 03:34

## 2018-12-16 RX ADMIN — MORPHINE SULFATE 4 MG: 10 INJECTION INTRAVENOUS at 11:05

## 2018-12-16 RX ADMIN — SODIUM CHLORIDE, PRESERVATIVE FREE 3 ML: 5 INJECTION INTRAVENOUS at 08:45

## 2018-12-16 RX ADMIN — POLYETHYLENE GLYCOL (3350) 17 G: 17 POWDER, FOR SOLUTION ORAL at 08:45

## 2018-12-16 RX ADMIN — LISINOPRIL: 10 TABLET ORAL at 08:45

## 2018-12-16 RX ADMIN — PIPERACILLIN SODIUM,TAZOBACTAM SODIUM 3.38 G: 3; .375 INJECTION, POWDER, FOR SOLUTION INTRAVENOUS at 06:02

## 2018-12-16 RX ADMIN — SODIUM CHLORIDE, POTASSIUM CHLORIDE, SODIUM LACTATE AND CALCIUM CHLORIDE 125 ML/HR: 600; 310; 30; 20 INJECTION, SOLUTION INTRAVENOUS at 14:23

## 2018-12-16 RX ADMIN — PIPERACILLIN SODIUM,TAZOBACTAM SODIUM 3.38 G: 3; .375 INJECTION, POWDER, FOR SOLUTION INTRAVENOUS at 14:23

## 2018-12-16 RX ADMIN — BUDESONIDE AND FORMOTEROL FUMARATE DIHYDRATE 2 PUFF: 160; 4.5 AEROSOL RESPIRATORY (INHALATION) at 09:19

## 2018-12-16 RX ADMIN — DOCUSATE SODIUM 100 MG: 100 CAPSULE ORAL at 08:45

## 2018-12-16 NOTE — PLAN OF CARE
Problem: Patient Care Overview  Goal: Plan of Care Review  Outcome: Ongoing (interventions implemented as appropriate)      Problem: Skin Injury Risk (Adult)  Goal: Identify Related Risk Factors and Signs and Symptoms  Outcome: Ongoing (interventions implemented as appropriate)    Goal: Skin Health and Integrity  Outcome: Ongoing (interventions implemented as appropriate)      Problem: Infection, Risk/Actual (Adult)  Goal: Infection Prevention/Resolution  Outcome: Ongoing (interventions implemented as appropriate)      Problem: Fall Risk (Adult)  Goal: Absence of Fall  Outcome: Ongoing (interventions implemented as appropriate)

## 2018-12-16 NOTE — DISCHARGE SUMMARY
[facility] GENERAL SURGERY DISCHARGE SUMMARY < 48 HOURS    Patient Identification:  Name:  Yahir Garsia  Age:  38 y.o.  Sex:  female  :  1980  MRN:  [id number]    Date of Admission: 2018  Date of Discharge:  2018     ADMISSION DIAGNOSIS: appendicitis    DISCHARGE DIAGNOSIS:  Same    PROCEDURES PERFORMED:  Procedure(s):  APPENDECTOMY LAPAROSCOPIC       HOSPITAL COURSE  Patient is a 38 y.o. female admitted to [facility] following laparoscopic appendectomy.  Please see the admitting history and physical for further details.  postoperatively the patient did well.  On postoperative day #1 the patient was tolerating clear liquids but wished to stay an additional night stating she felt she would get more rest.  Postoperative day #2 patient was afebrile.  She has been passing some gas but she did have some nausea and she did have dry heaves.  At the time of discharge patient is afebrile with hypoactive bowel sounds and expected incisional tenderness    CONDITION AT DISCHARGE:  Improved    DISCHARGE DISPOSITION   Home    DISCHARGE MEDICATIONS:     Discharge Medications      New Medications      Instructions Start Date   amoxicillin-clavulanate 875-125 MG per tablet  Commonly known as:  AUGMENTIN   1 tablet, Oral, 2 Times Daily      HYDROcodone-acetaminophen 7.5-325 MG per tablet  Commonly known as:  NORCO   1 tablet, Oral, 4 Times Daily PRN      ondansetron ODT 4 MG disintegrating tablet  Commonly known as:  ZOFRAN-ODT   4 mg, Oral, Every 8 Hours PRN         Continue These Medications      Instructions Start Date   lisinopril-hydrochlorothiazide 10-12.5 MG per tablet  Commonly known as:  PRINZIDE,ZESTORETIC   1 tablet, Oral, Daily             FOLLOW-UP:  Dr. Ayala in 2 weeks    Activity and diet instructions given to the patient

## 2018-12-16 NOTE — PLAN OF CARE
Problem: Patient Care Overview  Goal: Plan of Care Review   12/16/18 0235   Coping/Psychosocial   Plan of Care Reviewed With patient   Plan of Care Review   Progress no change       Problem: Skin Injury Risk (Adult)  Goal: Identify Related Risk Factors and Signs and Symptoms   12/15/18 1457   Skin Injury Risk (Adult)   Related Risk Factors (Skin Injury Risk) infection;mobility impaired     Goal: Skin Health and Integrity   12/16/18 0235   Skin Injury Risk (Adult)   Skin Health and Integrity making progress toward outcome       Problem: Infection, Risk/Actual (Adult)  Goal: Infection Prevention/Resolution   12/16/18 0235   Infection, Risk/Actual (Adult)   Infection Prevention/Resolution making progress toward outcome       Problem: Fall Risk (Adult)  Goal: Absence of Fall   12/16/18 0235   Fall Risk (Adult)   Absence of Fall making progress toward outcome

## 2018-12-17 ENCOUNTER — TELEPHONE (OUTPATIENT)
Dept: MEDSURG UNIT | Facility: HOSPITAL | Age: 38
End: 2018-12-17

## 2018-12-17 ENCOUNTER — READMISSION MANAGEMENT (OUTPATIENT)
Dept: CALL CENTER | Facility: HOSPITAL | Age: 38
End: 2018-12-17

## 2018-12-17 NOTE — OUTREACH NOTE
Prep Survey      Responses   Facility patient discharged from?  [place]   Is patient eligible?  Yes   Discharge diagnosis  Appendicitis, s/p appendectomy laparoscopic   Does the patient have one of the following disease processes/diagnoses(primary or secondary)?  General Surgery   Does the patient have Home health ordered?  No   Is there a DME ordered?  No   Comments regarding appointments  See AVS   Prep survey completed?  Yes          Gaby Oneill RN

## 2018-12-18 ENCOUNTER — READMISSION MANAGEMENT (OUTPATIENT)
Dept: CALL CENTER | Facility: HOSPITAL | Age: 38
End: 2018-12-18

## 2018-12-18 NOTE — OUTREACH NOTE
General Surgery Week 1 Survey      Responses   Facility patient discharged from?  [facility]   Does the patient have one of the following disease processes/diagnoses(primary or secondary)?  General Surgery   Is there a successful TCM telephone encounter documented?  No   Week 1 attempt successful?  Yes   Call start time  1617   Rescheduled  Rescheduled-pt requested [She is in pain today - call another time. ]   Call end time  1617   Discharge diagnosis  Appendicitis, s/p appendectomy laparoscopic          Rochelle Sarah RN

## 2018-12-19 ENCOUNTER — READMISSION MANAGEMENT (OUTPATIENT)
Dept: CALL CENTER | Facility: HOSPITAL | Age: 38
End: 2018-12-19

## 2018-12-19 NOTE — OUTREACH NOTE
General Surgery Week 1 Survey      Responses   Facility patient discharged from?  [facility]   Does the patient have one of the following disease processes/diagnoses(primary or secondary)?  General Surgery   Is there a successful TCM telephone encounter documented?  No   Week 1 attempt successful?  Yes   Call start time  1721   Call end time  1728   Discharge diagnosis  Appendicitis, s/p appendectomy laparoscopic   Is patient permission given to speak with other caregiver?  No   Meds reviewed with patient/caregiver?  Yes   Is the patient having any side effects they believe may be caused by any medication additions or changes?  No   Does the patient have all medications related to this admission filled (includes all antibiotics, pain medications, etc.)  Yes   Is the patient taking all medications as directed (includes completed medication regime)?  Yes   Does the patient have a follow up appointment scheduled with their surgeon?  Yes   Has the patient kept scheduled appointments due by today?  Yes   Comments  appt. December 20- 2018    Has home health visited the patient within 72 hours of discharge?  N/A   Psychosocial issues?  No   Did the patient receive a copy of their discharge instructions?  Yes   Nursing interventions  Reviewed instructions with patient, Educated on MyChart   What is the patient's perception of their health status since discharge?  Worsening   Nursing interventions  Nurse provided patient education   Is the patient /caregiver able to teach back basic post-op care?  Continue use of incentive spirometry at least 1 week post discharge, Practice 'cough and deep breath', Drive as instructed by MD in discharge instructions, Take showers only when approved by MD-sponge bathe until then, No tub bath, swimming, or hot tub until instructed by MD, Keep incision areas clean,dry and protected, Do not remove steri-strips, Lifting as instructed by MD in discharge instructions   Is the patient/caregiver able to  teach back signs and symptoms of incisional infection?  Increased redness, swelling or pain at the incisonal site, Increased drainage or bleeding, Incisional warmth, Pus or odor from incision, Fever [She has redness to one incision and the side is swollen. She has an appt. to see the surgeon tommorow. ]   Is the patient/caregiver able to teach back steps to recovery at home?  Set small, achievable goals for return to baseline health, Rest and rebuild strength, gradually increase activity, Weigh daily, Practice good oral hygiene, Eat a well-balance diet, Make a list of questions for surgeon's appointment   Is the patient/caregiver able to teach back the hierarchy of who to call/visit for symptoms/problems? PCP, Specialist, Home health nurse, Urgent Care, ED, 911  Yes   Week 1 call completed?  Yes          Rochelle Sarah RN

## 2018-12-20 ENCOUNTER — OFFICE VISIT (OUTPATIENT)
Dept: SURGERY | Facility: CLINIC | Age: 38
End: 2018-12-20

## 2018-12-20 ENCOUNTER — LAB (OUTPATIENT)
Dept: LAB | Facility: HOSPITAL | Age: 38
End: 2018-12-20
Attending: SURGERY

## 2018-12-20 ENCOUNTER — TELEPHONE (OUTPATIENT)
Dept: SURGERY | Facility: CLINIC | Age: 38
End: 2018-12-20

## 2018-12-20 VITALS
DIASTOLIC BLOOD PRESSURE: 95 MMHG | HEART RATE: 85 BPM | BODY MASS INDEX: 37.38 KG/M2 | HEIGHT: 61 IN | WEIGHT: 198 LBS | SYSTOLIC BLOOD PRESSURE: 117 MMHG

## 2018-12-20 DIAGNOSIS — K35.32 ACUTE APPENDICITIS WITH PERFORATION AND LOCALIZED PERITONITIS, WITHOUT ABSCESS OR GANGRENE: Primary | ICD-10-CM

## 2018-12-20 DIAGNOSIS — K35.32 ACUTE APPENDICITIS WITH PERFORATION AND LOCALIZED PERITONITIS, WITHOUT ABSCESS OR GANGRENE: ICD-10-CM

## 2018-12-20 DIAGNOSIS — Z09 POSTOP CHECK: ICD-10-CM

## 2018-12-20 LAB
DEPRECATED RDW RBC AUTO: 46.3 FL (ref 37–54)
EOSINOPHIL # BLD MANUAL: 0.36 10*3/MM3 (ref 0–0.7)
EOSINOPHIL NFR BLD MANUAL: 3 % (ref 0–5)
ERYTHROCYTE [DISTWIDTH] IN BLOOD BY AUTOMATED COUNT: 13.6 % (ref 11.5–14.5)
HCT VFR BLD AUTO: 33.2 % (ref 37–47)
HGB BLD-MCNC: 10.7 G/DL (ref 12–16)
HYPOCHROMIA BLD QL: ABNORMAL
LAB AP CASE REPORT: NORMAL
LYMPHOCYTES # BLD MANUAL: 2.79 10*3/MM3 (ref 1–3)
LYMPHOCYTES NFR BLD MANUAL: 23 % (ref 21–51)
LYMPHOCYTES NFR BLD MANUAL: 4 % (ref 0–10)
MCH RBC QN AUTO: 30 PG (ref 27–33)
MCHC RBC AUTO-ENTMCNC: 32.2 G/DL (ref 33–37)
MCV RBC AUTO: 93 FL (ref 80–94)
MONOCYTES # BLD AUTO: 0.49 10*3/MM3 (ref 0.1–0.9)
NEUTROPHILS # BLD AUTO: 8.49 10*3/MM3 (ref 1.4–6.5)
NEUTROPHILS NFR BLD MANUAL: 70 % (ref 30–70)
PATH REPORT.FINAL DX SPEC: NORMAL
PLAT MORPH BLD: NORMAL
PLATELET # BLD AUTO: 387 10*3/MM3 (ref 130–400)
PMV BLD AUTO: 9.4 FL (ref 6–10)
RBC # BLD AUTO: 3.57 10*6/MM3 (ref 4.2–5.4)
SCAN SLIDE: NORMAL
WBC NRBC COR # BLD: 12.13 10*3/MM3 (ref 4.5–12.5)

## 2018-12-20 PROCEDURE — 99024 POSTOP FOLLOW-UP VISIT: CPT | Performed by: SURGERY

## 2018-12-20 PROCEDURE — 85025 COMPLETE CBC W/AUTO DIFF WBC: CPT

## 2018-12-20 PROCEDURE — 85007 BL SMEAR W/DIFF WBC COUNT: CPT

## 2018-12-20 PROCEDURE — 36415 COLL VENOUS BLD VENIPUNCTURE: CPT

## 2018-12-20 NOTE — PROGRESS NOTES
"Subjective   Yahir Garsia is a 38 y.o. female here today for post op and wound check.    History of Present Illness  Ms. Garsia was seen in the office today for her first postoperative visit following a laparoscopic appendectomy on 12/14/18.  The appendix had perforated from a phlebolith but there was no abscess.  The patient was discharged home with antibiotics which she states she finished yesterday.  She states she is having a moderate amount of pain in the right and left lower abdomen.  She is having bowel movements but they're not really normal and she is having some diarrhea.  She states sometimes she has to sit for a few minutes before she can move her bowels.  No vomiting.  Low-grade fever yesterday.  No Known Allergies      Current Outpatient Medications   Medication Sig Dispense Refill   • amoxicillin-clavulanate (AUGMENTIN) 875-125 MG per tablet Take 1 tablet by mouth 2 (Two) Times a Day for 7 days. 14 tablet 0   • HYDROcodone-acetaminophen (NORCO) 7.5-325 MG per tablet Take 1 tablet by mouth 4 (Four) Times a Day As Needed for Moderate Pain . 20 tablet 0   • lisinopril-hydrochlorothiazide (PRINZIDE,ZESTORETIC) 10-12.5 MG per tablet Take 1 tablet by mouth Daily.     • ondansetron ODT (ZOFRAN-ODT) 4 MG disintegrating tablet Dissolve 1 tablet on the tongue Every 8 (Eight) Hours As Needed for Nausea or Vomiting. 15 tablet 0     No current facility-administered medications for this visit.        Objective   /95 (BP Location: Left arm)   Pulse 85   Ht 154.9 cm (61\")   Wt 89.8 kg (198 lb)   LMP 09/30/2016 (Exact Date)   BMI 37.41 kg/m²    Physical Exam  This is a well-developed well-nourished obese female in no acute distress  HEENT examination: Sclera are anicteric  Abdomen: Bowel sounds present.  The patient has lower abdominal tenderness.  There does not appear to be any guarding  Skin/incisions: Incision sites were inspected and demonstrate no drainage or erythema  Results/Data  Pathology " report was reviewed and discussed with the patient    Procedures     Assessment/Plan   Stable course, status post laparoscopic appendectomy for perforated appendicitis.    The patient's level of postoperative pain is difficult to evaluate.  She does not look toxic.  It is not clear whether or not she has a low pain tolerance or she may have an intra-abdominal abscess.  My index of suspicion is not high enough to go ahead and repeat the CT at this time.  I am going to go ahead and check a CBC.  If the white count is markedly elevated I will consider obtaining a CT scan.  I also discussed with the patient that if she has worsening pain or fever she should come into the emergency room and had a repeat CT scan done at that time.  She is also aware to call for any problems.  Patient and  voiced understanding of the current management plan.       Discussion/Summary    Errors in dictation may reflect use of voice recognition software and not all errors in transcription may have been detected prior to signing.    Future Appointments   Date Time Provider Department Center   12/31/2018  3:00 PM Augustina Ayala MD [facility] None

## 2018-12-27 ENCOUNTER — READMISSION MANAGEMENT (OUTPATIENT)
Dept: CALL CENTER | Facility: HOSPITAL | Age: 38
End: 2018-12-27

## 2018-12-27 NOTE — OUTREACH NOTE
General Surgery Week 3 Survey      Responses   Facility patient discharged from?  [facility]   Does the patient have one of the following disease processes/diagnoses(primary or secondary)?  General Surgery   Call start time  1733   Call end time  1737   Discharge diagnosis  Appendicitis, s/p appendectomy laparoscopic   Is patient permission given to speak with other caregiver?  No   Meds reviewed with patient/caregiver?  Yes   Is the patient having any side effects they believe may be caused by any medication additions or changes?  No   Does the patient have all medications related to this admission filled (includes all antibiotics, pain medications, etc.)  Yes   Is the patient taking all medications as directed (includes completed medication regime)?  Yes   Medication comments  abx   Does the patient have a follow up appointment scheduled with their surgeon?  Yes   Has the patient kept scheduled appointments due by today?  Yes   Comments  is being started on abx for UTI   Has home health visited the patient within 72 hours of discharge?  N/A   Psychosocial issues?  No   Did the patient receive a copy of their discharge instructions?  Yes   Nursing interventions  Reviewed instructions with patient, Educated on MyChart   What is the patient's perception of their health status since discharge?  New symptoms unrelated to diagnosis   Nursing interventions  Nurse provided patient education   Is the patient /caregiver able to teach back basic post-op care?  Lifting as instructed by MD in discharge instructions, Do not remove steri-strips, Keep incision areas clean,dry and protected, Take showers only when approved by MD-sponge bathe until then, No tub bath, swimming, or hot tub until instructed by MD   Is the patient/caregiver able to teach back signs and symptoms of incisional infection?  Increased redness, swelling or pain at the incisonal site, Increased drainage or bleeding, Incisional warmth, Fever, Pus or odor from  incision   Is the patient/caregiver able to teach back steps to recovery at home?  Make a list of questions for surgeon's appointment, Set small, achievable goals for return to baseline health   Is the patient/caregiver able to teach back the hierarchy of who to call/visit for symptoms/problems? PCP, Specialist, Home health nurse, Urgent Care, ED, 911  Yes          Santa Escalona RN

## 2018-12-27 NOTE — OUTREACH NOTE
General Surgery Week 2 Survey      Responses   Facility patient discharged from?  [facility]   Does the patient have one of the following disease processes/diagnoses(primary or secondary)?  General Surgery   Week 2 attempt successful?  Yes   Call start time  1733   Call end time  1737   Discharge diagnosis  Appendicitis, s/p appendectomy laparoscopic   Is patient permission given to speak with other caregiver?  No   Meds reviewed with patient/caregiver?  Yes   Is the patient having any side effects they believe may be caused by any medication additions or changes?  No   Does the patient have all medications related to this admission filled (includes all antibiotics, pain medications, etc.)  Yes   Is the patient taking all medications as directed (includes completed medication regime)?  Yes   Medication comments  abx   Does the patient have a follow up appointment scheduled with their surgeon?  Yes   Has the patient kept scheduled appointments due by today?  Yes   Comments  is being started on abx for UTI   Has home health visited the patient within 72 hours of discharge?  N/A   Psychosocial issues?  No   Did the patient receive a copy of their discharge instructions?  Yes   Nursing interventions  Reviewed instructions with patient, Educated on MyChart   What is the patient's perception of their health status since discharge?  New symptoms unrelated to diagnosis   Nursing interventions  Nurse provided patient education   Is the patient /caregiver able to teach back basic post-op care?  Lifting as instructed by MD in discharge instructions, Do not remove steri-strips, Keep incision areas clean,dry and protected, Take showers only when approved by MD-sponge bathe until then, No tub bath, swimming, or hot tub until instructed by MD   Is the patient/caregiver able to teach back signs and symptoms of incisional infection?  Increased redness, swelling or pain at the incisonal site, Increased drainage or bleeding, Incisional  warmth, Fever, Pus or odor from incision   Is the patient/caregiver able to teach back steps to recovery at home?  Make a list of questions for surgeon's appointment, Set small, achievable goals for return to baseline health   Is the patient/caregiver able to teach back the hierarchy of who to call/visit for symptoms/problems? PCP, Specialist, Home health nurse, Urgent Care, ED, 911  Yes   Week 2 call completed?  Yes          Santa Escalona RN

## 2018-12-31 ENCOUNTER — OFFICE VISIT (OUTPATIENT)
Dept: SURGERY | Facility: CLINIC | Age: 38
End: 2018-12-31

## 2018-12-31 VITALS
HEART RATE: 111 BPM | BODY MASS INDEX: 35.76 KG/M2 | SYSTOLIC BLOOD PRESSURE: 147 MMHG | WEIGHT: 189.4 LBS | DIASTOLIC BLOOD PRESSURE: 94 MMHG | HEIGHT: 61 IN

## 2018-12-31 DIAGNOSIS — K35.32 ACUTE APPENDICITIS WITH PERFORATION AND LOCALIZED PERITONITIS, WITHOUT ABSCESS OR GANGRENE: Primary | ICD-10-CM

## 2018-12-31 DIAGNOSIS — Z09 POSTOP CHECK: ICD-10-CM

## 2018-12-31 PROCEDURE — 99024 POSTOP FOLLOW-UP VISIT: CPT | Performed by: SURGERY

## 2018-12-31 RX ORDER — FLUCONAZOLE 150 MG/1
150 TABLET ORAL ONCE
Qty: 3 TABLET | Refills: 0 | Status: SHIPPED | OUTPATIENT
Start: 2018-12-31 | End: 2018-12-31

## 2018-12-31 NOTE — PROGRESS NOTES
Subjective   Yahir Garsia is a 38 y.o. female here today for post op.  History of Present Illness  Ms. Garsia was seen in the office today for her second postoperative visit following a laparoscopic appendectomy on 12/14/18.  The appendix had perforated from a phlebolith but there was no abscess.  The patient was discharged home with antibiotics.  Time of the patient's last visit she had multiple complaints of pain, low-grade fever, diarrhea.  She had lab work done the day of her visit which demonstrated a normal WBC.  Her hemoglobin had dropped to 10.7 from approximately 13.5.  Since that time the patient states she has seen her primary care doctor and was diagnosed initially with a UTI but then told she didn't have a UTI.  She was treated with additional antibiotics for this.  She now has a vaginal discharge for.  She states she still has pain in the right lower quadrant with coughing.  She is eating but still has some nausea for which she is taking some Zofran.  She states her stools are soft.  She also reports a low-grade temperature.  No Known Allergies      Current Outpatient Medications   Medication Sig Dispense Refill   • HYDROcodone-acetaminophen (NORCO) 7.5-325 MG per tablet Take 1 tablet by mouth 4 (Four) Times a Day As Needed for Moderate Pain . 20 tablet 0   • lisinopril-hydrochlorothiazide (PRINZIDE,ZESTORETIC) 10-12.5 MG per tablet Take 1 tablet by mouth Daily.     • ondansetron ODT (ZOFRAN-ODT) 4 MG disintegrating tablet Dissolve 1 tablet on the tongue Every 8 (Eight) Hours As Needed for Nausea or Vomiting. 15 tablet 0     No current facility-administered medications for this visit.        Objective   LMP 09/30/2016 (Exact Date)    Physical Exam  This is a well-developed well-nourished female in no acute distress  HEENT examination: Sclera are anicteric  Abdomen: Bowel sounds are present and normal.  The abdomen is nondistended.  There is some mild right lower quadrant tenderness without  guarding  Skin/incisions: Incision sites were inspected and demonstrate no drainage or erythema  Results/Data      Procedures     Assessment/Plan   Status post laparoscopic appendectomy for perforated appendicitis. The patient's level of postoperative pain is difficult to evaluate.  She does not look toxic.  It is not clear whether or not she has a low pain tolerance or whether there is something going on in the abdomen.  Given her normal white count 8 days ago I would tend to favor perhaps a postoperative hematoma as the source of her pain.  Only a CT scan may not distinguish between a postoperative hematoma and an infectious process.    Plan: I did give the patient an additional week off from work.  I also suggested to her that if she does not feel well that she should go back to the emergency room and have them do a follow-up CT scan as this is probably the most expeditious way to accomplish this.  I also gave her prescription for Diflucan for what clinically appears to be a East infection.           Discussion/Summary    Errors in dictation may reflect use of voice recognition software and not all errors in transcription may have been detected prior to signing.    No future appointments.

## 2019-01-03 ENCOUNTER — READMISSION MANAGEMENT (OUTPATIENT)
Dept: CALL CENTER | Facility: HOSPITAL | Age: 39
End: 2019-01-03

## 2019-01-03 NOTE — OUTREACH NOTE
General Surgery Week 3 Survey      Responses   Facility patient discharged from?  [facility]   Does the patient have one of the following disease processes/diagnoses(primary or secondary)?  General Surgery   Week 3 attempt successful?  Yes   Call start time  1728   Call end time  1733   Discharge diagnosis  Appendicitis, s/p appendectomy laparoscopic   Is patient permission given to speak with other caregiver?  No   Meds reviewed with patient/caregiver?  Yes   Is the patient having any side effects they believe may be caused by any medication additions or changes?  No   Does the patient have all medications related to this admission filled (includes all antibiotics, pain medications, etc.)  Yes   Medication comments  Patient states that she has completed antibiotics for UTI.    Does the patient have a follow up appointment scheduled with their surgeon?  Yes   Has the patient kept scheduled appointments due by today?  Yes   Has home health visited the patient within 72 hours of discharge?  N/A   Psychosocial issues?  No   Did the patient receive a copy of their discharge instructions?  Yes   Nursing interventions  Reviewed instructions with patient   What is the patient's perception of their health status since discharge?  Improving [Patient states that she has improved, but still having significant pain. States that fever resolved approx 3-4 days ago. ]   Nursing interventions  Nurse provided patient education, Advised patient to call provider   Is the patient/caregiver able to teach back signs and symptoms of incisional infection?  Increased redness, swelling or pain at the incisonal site, Increased drainage or bleeding, Incisional warmth, Pus or odor from incision, Fever   Is the patient/caregiver able to teach back steps to recovery at home?  Set small, achievable goals for return to baseline health, Rest and rebuild strength, gradually increase activity   Is the patient/caregiver able to teach back the hierarchy of  who to call/visit for symptoms/problems? PCP, Specialist, Home health nurse, Urgent Care, ED, 911  Yes   Additional teach back comments  Patient states that she has called PCP and seeing tomorrow.    Week 3 call completed?  Yes          Gaby Wolfe RN

## 2019-05-28 ENCOUNTER — OFFICE VISIT (OUTPATIENT)
Dept: SURGERY | Facility: CLINIC | Age: 39
End: 2019-05-28

## 2019-05-28 VITALS
HEART RATE: 95 BPM | OXYGEN SATURATION: 98 % | RESPIRATION RATE: 16 BRPM | HEIGHT: 61 IN | SYSTOLIC BLOOD PRESSURE: 134 MMHG | WEIGHT: 195.4 LBS | BODY MASS INDEX: 36.89 KG/M2 | DIASTOLIC BLOOD PRESSURE: 93 MMHG | TEMPERATURE: 98 F

## 2019-05-28 DIAGNOSIS — K21.9 GASTROESOPHAGEAL REFLUX DISEASE, ESOPHAGITIS PRESENCE NOT SPECIFIED: Primary | ICD-10-CM

## 2019-05-28 PROCEDURE — 99213 OFFICE O/P EST LOW 20 MIN: CPT | Performed by: SURGERY

## 2019-05-28 RX ORDER — PANTOPRAZOLE SODIUM 40 MG/1
TABLET, DELAYED RELEASE ORAL
Refills: 5 | COMMUNITY
Start: 2019-05-13 | End: 2019-10-07

## 2019-05-28 NOTE — PROGRESS NOTES
5/28/2019    Patient Information  Yahir Garsia  [address] [place] [address]  1980  [phone] (home)     Chief Complaint   Patient presents with   • Heartburn     Recurrent Reflux       HPI  Patient is a 30-year-old white female who is in November 2018 underwent a laparoscopic Nissen fundoplication and repair of hiatal hernia for reflux and persistent hoarseness.  She initially had very good results.  She is suffered a ruptured appendix in December 2018.  He said after this her hoarseness and reflux returned.    Review of Systems    General: weight gain, weight loss  Integumentary: negative  Eyes: glasses, dry  ENT: recurrent sore throat and hoarseness  Respiratory: chronic cough  Gastrointestinal: heartburn  Cardiovascular: negative  Neurological: headaches and dizziness  Psychiatric: anxiety and depression  Hematologic/Lymphatic: easy bleeding and easy bruising  Genitourinary: negative  Musculoskeletal: negative  Endocrine: hot flashes  Breasts: negative      Patient Active Problem List   Diagnosis   • Leiomyoma   • Encounter for consultation   • Tobacco use   • Hoarseness   • Preoperative clearance   • Obesity, Class III, BMI 40-49.9 (morbid obesity) (CMS/HCC)   • Essential hypertension   • Mixed hyperlipidemia   • Abnormal EKG   • SOB (shortness of breath)   • Gastroesophageal reflux disease with esophagitis   • Gastroesophageal reflux disease   • Hand pain, right   • Kidney stone   • Appendicitis         Past Medical History:   Diagnosis Date   • Anxiety    • Chronic laryngitis     HISTORY OF   • Depression    • Endometriosis    • Fatigue    • GERD (gastroesophageal reflux disease)     full history in HPI.  SEVERE, on H2 blocker only due ot insurance not covering PPI.  Loses employment opportunities due to hoarseness from reflux.    • Hyperlipidemia    • Hypertension    • Kidney stones    • Migraine    • Prediabetes     HISTORY OF   • Sleep apnea     does not use CPAP due to intolerance   • Spinal  headache          Past Surgical History:   Procedure Laterality Date   • ABDOMINAL SURGERY     • APPENDECTOMY N/A 2018    Procedure: APPENDECTOMY LAPAROSCOPIC;  Surgeon: Augustina Ayala MD;  [facility]:  [facility];  Service: General   • BRAVO PROCEDURE N/A 2017    Procedure: ESOPHAGOGASTRODUODENOSCOPY AND NAILS;  Surgeon: Morris Solares MD;  [facility]:  [facility];  Service:    • CARDIOVASCULAR STRESS TEST  2017   •  SECTION PRIMARY      done under general anesthesia b/c couldn't get epidural   •  SECTION WITH TUBAL     • CYSTOSCOPY N/A 2018    Procedure: CYSTOSCOPY;  Surgeon: Ousmane Alvarez MD;  Location:  [facility];  Service: Urology   • DIAGNOSTIC LAPAROSCOPY      looking for endometriosis   • DILATATION AND CURETTAGE N/A 10/4/2016    Procedure: DILATATION AND CURETTAGE WITH FROZEN SECTION;  Surgeon: Rajendra Rico MD;  Location:  [facility];  for abnormal uterine bleeding   • ECHO - CONVERTED  2017   • ENDOMETRIAL FULGURATION     • ENDOSCOPY      in Dr. Beck Gongora   • EXTRACORPOREAL SHOCK WAVE LITHOTRIPSY (ESWL) Left 2018    Procedure: EXTRACORPOREAL SHOCKWAVE LITHOTRIPSY;  Surgeon: Ousmane Alvarez MD;  [facility]:  [facility];  Service: Urology   • HIATAL HERNIA REPAIR N/A 2018    Procedure: HIATAL HERNIA REPAIR LAPAROSCOPIC;  Surgeon: Morris Solares MD;  [facility];  Service:    • NISSEN FUNDOPLICATION N/A 2018    Procedure: NISSEN FUNDOPLICATION LAPAROSCOPIC ;  Surgeon: Morris Solares MD;  [facility];  Service:    • TOTAL LAPAROSCOPIC HYSTERECTOMY N/A 10/4/2016    Procedure: TOTAL LAPAROSCOPIC HYSTERECTOMY BILATERAL SALPINGO OOPHERECTOMY WITH DAVINCI SI ROBOT;  Surgeon: Rajendra Rico MD;  Location:  [facility];  endometriosis/fibroids   • TUBAL ABDOMINAL LIGATION           Family History   Problem Relation Age of Onset   • No Known Problems Mother    • No Known Problems Father    • Obesity Sister    •  "Hypertension Sister          Social History     Tobacco Use   • Smoking status: Current Some Day Smoker     Packs/day: 0.50     Years: 12.00     Pack years: 6.00     Types: Cigarettes   • Smokeless tobacco: Never Used   Substance Use Topics   • Alcohol use: Yes     Comment: occasional   • Drug use: No       Current Outpatient Medications   Medication Sig Dispense Refill   • HYDROcodone-acetaminophen (NORCO) 7.5-325 MG per tablet Take 1 tablet by mouth 4 (Four) Times a Day As Needed for Moderate Pain . 20 tablet 0   • lisinopril-hydrochlorothiazide (PRINZIDE,ZESTORETIC) 10-12.5 MG per tablet Take 1 tablet by mouth Daily.     • ondansetron ODT (ZOFRAN-ODT) 4 MG disintegrating tablet Dissolve 1 tablet on the tongue Every 8 (Eight) Hours As Needed for Nausea or Vomiting. 15 tablet 0   • pantoprazole (PROTONIX) 40 MG EC tablet   5     No current facility-administered medications for this visit.          Allergies  Patient has no known allergies.    /93   Pulse 95   Temp 98 °F (36.7 °C)   Resp 16   Ht 154.9 cm (61\")   Wt 88.6 kg (195 lb 6.4 oz)   LMP 09/30/2016 (Exact Date)   SpO2 98%   BMI 36.92 kg/m²      Physical Exam    General :  Patient is a 38 y.o. female in no acute distress.    HEENT:  Normocephalic, pupils equally round and reactive to light, extraocular motions intact.  Chest:  Clear bilaterally. Equal breath sounds. No rales or rhonchi.  Heart:   Regular rate and rhythm.  Abdomen:  Soft, nontender. No distention.  :  Normal external genitalia.  Rectal:  Not performed.  Extremities:  No clubbing cyanosis or edema. Good femoral, popliteal, dorsalis pedis and posterior tibial pulse.  Neurologic:  Patient oriented ×3. Cranial nerves grossly intact. No sensory,cellebellar, or motor dysfunction.                ASSESSMENT  Recurrent gastroesophageal reflux disease        PLAN    EGD with pH study    Patient's Body mass index is 36.92 kg/m². BMI is above normal parameters. Recommendations include: " educational material.           This document signed by Morris Solares MD May 28, 2019 10:53 AM

## 2019-06-07 ENCOUNTER — TELEPHONE (OUTPATIENT)
Dept: SURGERY | Facility: CLINIC | Age: 39
End: 2019-06-07

## 2019-06-07 ENCOUNTER — ANESTHESIA EVENT (OUTPATIENT)
Dept: PERIOP | Facility: HOSPITAL | Age: 39
End: 2019-06-07

## 2019-06-10 ENCOUNTER — ANESTHESIA (OUTPATIENT)
Dept: PERIOP | Facility: HOSPITAL | Age: 39
End: 2019-06-10

## 2019-06-10 ENCOUNTER — HOSPITAL ENCOUNTER (OUTPATIENT)
Facility: HOSPITAL | Age: 39
Setting detail: HOSPITAL OUTPATIENT SURGERY
Discharge: HOME | End: 2019-06-10
Attending: SURGERY | Admitting: SURGERY

## 2019-06-10 VITALS
BODY MASS INDEX: 36.44 KG/M2 | OXYGEN SATURATION: 98 % | HEIGHT: 61 IN | SYSTOLIC BLOOD PRESSURE: 101 MMHG | WEIGHT: 193 LBS | TEMPERATURE: 97 F | HEART RATE: 75 BPM | RESPIRATION RATE: 20 BRPM | DIASTOLIC BLOOD PRESSURE: 76 MMHG

## 2019-06-10 DIAGNOSIS — K21.9 GASTROESOPHAGEAL REFLUX DISEASE, ESOPHAGITIS PRESENCE NOT SPECIFIED: ICD-10-CM

## 2019-06-10 PROCEDURE — 25010000002 PROPOFOL 1000 MG/ML EMULSION: Performed by: NURSE ANESTHETIST, CERTIFIED REGISTERED

## 2019-06-10 PROCEDURE — 25010000002 FENTANYL CITRATE (PF) 100 MCG/2ML SOLUTION: Performed by: NURSE ANESTHETIST, CERTIFIED REGISTERED

## 2019-06-10 PROCEDURE — 25010000002 PROPOFOL 10 MG/ML EMULSION: Performed by: NURSE ANESTHETIST, CERTIFIED REGISTERED

## 2019-06-10 PROCEDURE — 43239 EGD BIOPSY SINGLE/MULTIPLE: CPT | Performed by: SURGERY

## 2019-06-10 RX ORDER — LIDOCAINE HYDROCHLORIDE 20 MG/ML
INJECTION, SOLUTION INFILTRATION; PERINEURAL AS NEEDED
Status: DISCONTINUED | OUTPATIENT
Start: 2019-06-10 | End: 2019-06-10 | Stop reason: SURG

## 2019-06-10 RX ORDER — ONDANSETRON 2 MG/ML
4 INJECTION INTRAMUSCULAR; INTRAVENOUS ONCE AS NEEDED
Status: DISCONTINUED | OUTPATIENT
Start: 2019-06-10 | End: 2019-06-10 | Stop reason: HOSPADM

## 2019-06-10 RX ORDER — MEPERIDINE HYDROCHLORIDE 25 MG/ML
12.5 INJECTION INTRAMUSCULAR; INTRAVENOUS; SUBCUTANEOUS
Status: DISCONTINUED | OUTPATIENT
Start: 2019-06-10 | End: 2019-06-10 | Stop reason: HOSPADM

## 2019-06-10 RX ORDER — SODIUM CHLORIDE 0.9 % (FLUSH) 0.9 %
3 SYRINGE (ML) INJECTION EVERY 12 HOURS SCHEDULED
Status: DISCONTINUED | OUTPATIENT
Start: 2019-06-10 | End: 2019-06-10 | Stop reason: HOSPADM

## 2019-06-10 RX ORDER — PROPOFOL 10 MG/ML
VIAL (ML) INTRAVENOUS AS NEEDED
Status: DISCONTINUED | OUTPATIENT
Start: 2019-06-10 | End: 2019-06-10 | Stop reason: SURG

## 2019-06-10 RX ORDER — FENTANYL CITRATE 50 UG/ML
INJECTION, SOLUTION INTRAMUSCULAR; INTRAVENOUS AS NEEDED
Status: DISCONTINUED | OUTPATIENT
Start: 2019-06-10 | End: 2019-06-10 | Stop reason: SURG

## 2019-06-10 RX ORDER — OXYCODONE HYDROCHLORIDE AND ACETAMINOPHEN 5; 325 MG/1; MG/1
1 TABLET ORAL ONCE AS NEEDED
Status: DISCONTINUED | OUTPATIENT
Start: 2019-06-10 | End: 2019-06-10 | Stop reason: HOSPADM

## 2019-06-10 RX ORDER — IPRATROPIUM BROMIDE AND ALBUTEROL SULFATE 2.5; .5 MG/3ML; MG/3ML
3 SOLUTION RESPIRATORY (INHALATION) ONCE AS NEEDED
Status: DISCONTINUED | OUTPATIENT
Start: 2019-06-10 | End: 2019-06-10 | Stop reason: HOSPADM

## 2019-06-10 RX ORDER — SODIUM CHLORIDE 0.9 % (FLUSH) 0.9 %
3-10 SYRINGE (ML) INJECTION AS NEEDED
Status: DISCONTINUED | OUTPATIENT
Start: 2019-06-10 | End: 2019-06-10 | Stop reason: HOSPADM

## 2019-06-10 RX ORDER — FENTANYL CITRATE 50 UG/ML
50 INJECTION, SOLUTION INTRAMUSCULAR; INTRAVENOUS
Status: DISCONTINUED | OUTPATIENT
Start: 2019-06-10 | End: 2019-06-10 | Stop reason: HOSPADM

## 2019-06-10 RX ORDER — SODIUM CHLORIDE, SODIUM LACTATE, POTASSIUM CHLORIDE, CALCIUM CHLORIDE 600; 310; 30; 20 MG/100ML; MG/100ML; MG/100ML; MG/100ML
125 INJECTION, SOLUTION INTRAVENOUS CONTINUOUS
Status: DISCONTINUED | OUTPATIENT
Start: 2019-06-10 | End: 2019-06-10 | Stop reason: HOSPADM

## 2019-06-10 RX ADMIN — LIDOCAINE HYDROCHLORIDE 60 MG: 20 INJECTION, SOLUTION INFILTRATION; PERINEURAL at 07:35

## 2019-06-10 RX ADMIN — SODIUM CHLORIDE, POTASSIUM CHLORIDE, SODIUM LACTATE AND CALCIUM CHLORIDE: 600; 310; 30; 20 INJECTION, SOLUTION INTRAVENOUS at 07:34

## 2019-06-10 RX ADMIN — PROPOFOL 30 MG: 10 INJECTION, EMULSION INTRAVENOUS at 07:36

## 2019-06-10 RX ADMIN — FENTANYL CITRATE 100 MCG: 50 INJECTION INTRAMUSCULAR; INTRAVENOUS at 07:35

## 2019-06-10 RX ADMIN — PROPOFOL 200 MCG/KG/MIN: 10 INJECTION, EMULSION INTRAVENOUS at 07:36

## 2019-06-10 NOTE — ANESTHESIA PREPROCEDURE EVALUATION
Anesthesia Evaluation     Patient summary reviewed and Nursing notes reviewed   history of anesthetic complications: PONV  NPO Solid Status: > 8 hours  NPO Liquid Status: > 8 hours           Airway   Mallampati: III  TM distance: >3 FB  Neck ROM: full  no difficulty expected  Dental - normal exam   (+) poor dentition    Comment: Small chip upper R incisor    Pulmonary - normal exam   (+) a smoker Current Smoked day of surgery, sleep apnea (mild),   (-) asthma, shortness of breath  Cardiovascular - normal exam  Exercise tolerance: good (4-7 METS)    NYHA Classification: II    (+) hypertension, dysrhythmias, hyperlipidemia,   (-) past MI, angina, CHF      Neuro/Psych  (+) headaches, psychiatric history Anxiety and Depression,     (-) seizures, CVA  GI/Hepatic/Renal/Endo    (+) obesity,  hiatal hernia, GERD,  renal disease stones,   (-) morbid obesity, liver disease, diabetes, hypothyroidism    Musculoskeletal     (+) back pain,   Abdominal  - normal exam    Bowel sounds: normal.   Substance History   (+) alcohol use,      OB/GYN negative ob/gyn ROS         Other - negative ROS       (-) arthritis                    Anesthesia Plan    ASA 3     general     intravenous induction   Anesthetic plan, all risks, benefits, and alternatives have been provided, discussed and informed consent has been obtained with: patient and spouse/significant other.  Use of blood products discussed with patient and spouse/significant other  Consented to blood products.

## 2019-06-10 NOTE — ANESTHESIA POSTPROCEDURE EVALUATION
Patient: Yahir Garsia    Procedure Summary     Date:  06/10/19 Room / Location:  [facility] OR  /  [facility]    Anesthesia Start:  0733 Anesthesia Stop:  0747    Procedure:  ESOPHAGOGASTRODUODENOSCOPY AND BRAVO; GE Junction at 36cm (N/A Esophagus) Diagnosis:       Gastroesophageal reflux disease, esophagitis presence not specified      (Gastroesophageal reflux disease, esophagitis presence not specified [K21.9])    Surgeon:  Morris Solares MD Provider:  Brijesh Lechuga MD    Anesthesia Type:  general ASA Status:  3          Anesthesia Type: general  Last vitals  BP   102/66 (06/10/19 0755)   Temp   97 °F (36.1 °C) (06/10/19 0750)   Pulse   82 (06/10/19 0755)   Resp   20 (06/10/19 0755)     SpO2   97 % (06/10/19 0755)     Post Anesthesia Care and Evaluation    Patient location during evaluation: PHASE II  Patient participation: complete - patient participated  Level of consciousness: awake and alert  Pain score: 1  Pain management: adequate  Airway patency: patent  Anesthetic complications: No anesthetic complications  PONV Status: controlled  Cardiovascular status: acceptable  Respiratory status: acceptable  Hydration status: acceptable

## 2019-06-10 NOTE — OP NOTE
Yahir Garsia  6/10/2019      Operative Progress Note:    Surgeon and Assistant: Dr. Solares    Pre-Operative Diagnosis: History of Nissen fundoplication with recurrent hoarseness    Post-Operative Diagnosis: Nissen fundoplication with recurrent hoarseness with normal EGD    Procedure(s): EGD with biopsies and placement of pH probe    Type of Anesthesia Administered: IV general    Estimated Blood Loss: Minimal    Blood Products: None    Specimen Obtained/Removed antral biopsy    Complication(s):  None    Graft/Implant/Prosthetics/Implanted Device/Transplants: H probe at 30 cm    Indication: Patient is a 38-year-old white female who done a laparoscopic Nissen fundoplication and repair of hiatal hernia for reflux disease.  1 of her main symptoms preoperatively was hoarseness.  Her hoarseness has returned    Findings: Duodenum normal, stomach normal with findings consistent with Nissen fundoplication with wrap in excellent position, esophagus normal, gastroesophageal junction 36 cm.    Operative Report:  Patient was taken operating room and placed in a left lateral decubitus position.  IV sedation was given per anesthesia.  Gastroscope was introduced and passed into the duodenum.  The scope was slowly withdrawn.  I examined the duodenum, stomach and esophagus thoroughly.  Biopsies were taken as indicated above.  Findings are listed as above.    Patient will be discharged home at recovery and will be seen back in the office in 1 week.       Electronically Signed by: Morris Solares MD        Dictated Utilizing Dragon Dictation

## 2019-06-12 LAB
LAB AP CASE REPORT: NORMAL
PATH REPORT.FINAL DX SPEC: NORMAL

## 2019-06-18 ENCOUNTER — OFFICE VISIT (OUTPATIENT)
Dept: SURGERY | Facility: CLINIC | Age: 39
End: 2019-06-18

## 2019-06-18 VITALS
BODY MASS INDEX: 36.32 KG/M2 | OXYGEN SATURATION: 98 % | HEIGHT: 61 IN | WEIGHT: 192.4 LBS | RESPIRATION RATE: 17 BRPM | SYSTOLIC BLOOD PRESSURE: 132 MMHG | DIASTOLIC BLOOD PRESSURE: 91 MMHG | TEMPERATURE: 98 F | HEART RATE: 97 BPM

## 2019-06-18 DIAGNOSIS — R49.0 HOARSENESS: Primary | ICD-10-CM

## 2019-06-18 PROCEDURE — 99213 OFFICE O/P EST LOW 20 MIN: CPT | Performed by: SURGERY

## 2019-06-18 NOTE — PROGRESS NOTES
6/18/2019    Patient Information  Yahir Garsia  [address] [place] [address]  1980  [phone] (home)     Chief Complaint   Patient presents with   • Follow-up     FU EGD/BRAVO       HPI  Patient is a 38-year-old white female for follow-up on EGD and pH study which was done because of recurrent hoarseness after Nissen fundoplication.  Preoperatively her DeMeester score was 29 indicating significant reflux.  Her repeat DeMeester score postoperatively is very low indicating no significant reflux.  EGD showed no significant pathology in the esophagus, stomach, or duodenum.  It confirmed that the Nissen wrap was in excellent position.    Patient is mainly concerned about her persistent hoarseness.  She reports she seen 3 different ENT doctors over the year and also speech pathologist and has had very thorough work-up related to this.  Dr. Pio BARRERA referred her to me because of the suspicion of her hoarseness being related to gastroesophageal reflux disease.  Initially she had regain of her voice after the procedure for few months but now she is back to baseline she was at prior to the surgery.    Review of Systems    The Past medical history, family history, social history, medication list, allergies, and Review of Systems has been reviewed and confirmed.      General: weight gain, weight loss   Integumentary: negative  Eyes: glasses, dry eyes  ENT: recurrent sore throat and hoarseness  Respiratory: chronic cough  Gastrointestinal: heartburn  Cardiovascular: negative  Neurological: headaches and dizziness  Psychiatric: anxiety and depression  Hematologic/Lymphatic: easy bleeding and easy bruising  Genitourinary: negative  Musculoskeletal: negative  Endocrine: hot flashes  Breasts: negative      Patient Active Problem List   Diagnosis   • Leiomyoma   • Encounter for consultation   • Tobacco use   • Hoarseness   • Preoperative clearance   • Obesity, Class III, BMI 40-49.9 (morbid obesity) (CMS/[facility])   •  Essential hypertension   • Mixed hyperlipidemia   • Abnormal EKG   • SOB (shortness of breath)   • Gastroesophageal reflux disease with esophagitis   • Hand pain, right   • Kidney stone   • Appendicitis         Past Medical History:   Diagnosis Date   • Anxiety    • Chronic laryngitis     HISTORY OF   • Depression    • Endometriosis    • Fatigue    • GERD (gastroesophageal reflux disease)     full history in hospitals.  SEVERE, on H2 blocker only due ot insurance not covering PPI.  Loses employment opportunities due to hoarseness from reflux.    • Hyperlipidemia    • Hypertension    • Kidney stones    • Migraine    • Prediabetes     HISTORY OF   • Sleep apnea     does not use CPAP due to intolerance   • Spinal headache          Past Surgical History:   Procedure Laterality Date   • ABDOMINAL SURGERY     • APPENDECTOMY N/A 2018    Procedure: APPENDECTOMY LAPAROSCOPIC;  Surgeon: Augustina Ayala MD;  Location: [facility] OR;  Service: General   • BRAVO PROCEDURE N/A 2017    Procedure: ESOPHAGOGASTRODUODENOSCOPY AND NAISL;  Surgeon: Morris Solares MD;  Location: [facility] OR;  Service:    • BRAVO PROCEDURE N/A 6/10/2019    Procedure: ESOPHAGOGASTRODUODENOSCOPY AND BRAVO; GE Junction at 36cm;  Surgeon: Morris Solares MD;  Location: [facility];  Service: Gastroenterology   • CARDIOVASCULAR STRESS TEST  2017   •  SECTION PRIMARY      done under general anesthesia b/c couldn't get epidural   •  SECTION WITH TUBAL     • CYSTOSCOPY N/A 2018    Procedure: CYSTOSCOPY;  Surgeon: Ousmane Alvarez MD;  Location: [facility] OR;  Service: Urology   • DIAGNOSTIC LAPAROSCOPY      looking for endometriosis   • DILATATION AND CURETTAGE N/A 10/4/2016    Procedure: DILATATION AND CURETTAGE WITH FROZEN SECTION;  Surgeon: Rajendra Rico MD;  Location: [facility] OR;  for abnormal uterine bleeding   • ECHO - CONVERTED  2017   • ENDOMETRIAL FULGURATION     • ENDOSCOPY  2017    in [place], Dr. Brown   •  "EXTRACORPOREAL SHOCK WAVE LITHOTRIPSY (ESWL) Left 11/2/2018    Procedure: EXTRACORPOREAL SHOCKWAVE LITHOTRIPSY;  Surgeon: Ousmane Alvarez MD;  Location: [facility] OR;  Service: Urology   • HIATAL HERNIA REPAIR N/A 1/29/2018    Procedure: HIATAL HERNIA REPAIR LAPAROSCOPIC;  Surgeon: Morris Solares MD;  Location: [facility] OR;  Service:    • NISSEN FUNDOPLICATION N/A 1/29/2018    Procedure: NISSEN FUNDOPLICATION LAPAROSCOPIC ;  Surgeon: Morris Solares MD;  Location: [facility] OR;  Service:    • TOTAL LAPAROSCOPIC HYSTERECTOMY N/A 10/4/2016    Procedure: TOTAL LAPAROSCOPIC HYSTERECTOMY BILATERAL SALPINGO OOPHERECTOMY WITH DAVINCI SI ROBOT;  Surgeon: Rajendra Rico MD;  Location: [facility] OR;  endometriosis/fibroids   • TUBAL ABDOMINAL LIGATION  2006         Family History   Problem Relation Age of Onset   • No Known Problems Mother    • No Known Problems Father    • Obesity Sister    • Hypertension Sister          Social History     Tobacco Use   • Smoking status: Current Some Day Smoker     Packs/day: 0.50     Years: 12.00     Pack years: 6.00     Types: Cigarettes   • Smokeless tobacco: Never Used   Substance Use Topics   • Alcohol use: Yes     Comment: occasional   • Drug use: No       Current Outpatient Medications   Medication Sig Dispense Refill   • lisinopril-hydrochlorothiazide (PRINZIDE,ZESTORETIC) 10-12.5 MG per tablet Take 1 tablet by mouth Daily.     • pantoprazole (PROTONIX) 40 MG EC tablet   5     No current facility-administered medications for this visit.          Allergies  Patient has no known allergies.    /91   Pulse 97   Temp 98 °F (36.7 °C)   Resp 17   Ht 154.9 cm (61\")   Wt 87.3 kg (192 lb 6.4 oz)   LMP 09/30/2016 (Exact Date)   SpO2 98%   BMI 36.35 kg/m²      Physical Exam    General :  Patient is a 38 y.o. female in no acute distress.    HEENT:  Normocephalic, pupils equally round and reactive to light, extraocular motions intact.  Patient has hoarseness  Chest:  Clear " bilaterally. Equal breath sounds. No rales or rhonchi.  Heart:   Regular rate and rhythm.  Abdomen:  Soft, nontender. No distention.  :  Normal external genitalia.  Rectal:  Not performed.  Extremities:  No clubbing cyanosis or edema. Good femoral, popliteal, dorsalis pedis and posterior tibial pulse.  Neurologic:  Patient oriented ×3. Cranial nerves grossly intact. No sensory,cellebellar, or motor dysfunction.                ASSESSMENT  Patient has idiopathic hoarseness or spasmodic dysphonia        PLAN    I am going to get a second opinion in relation to this diagnosis from Dr. gonzalez, ENT specialist, in [place].    Patient's Body mass index is 36.35 kg/m². BMI is above normal parameters. Recommendations include: educational material.           This document signed by Morris Solares MD June 18, 2019 11:25 AM

## 2019-06-20 ENCOUNTER — TELEPHONE (OUTPATIENT)
Dept: SURGERY | Facility: CLINIC | Age: 39
End: 2019-06-20

## 2019-10-07 ENCOUNTER — OFFICE VISIT (OUTPATIENT)
Dept: PSYCHIATRY | Facility: CLINIC | Age: 39
End: 2019-10-07

## 2019-10-07 VITALS
HEIGHT: 61 IN | HEART RATE: 89 BPM | SYSTOLIC BLOOD PRESSURE: 122 MMHG | DIASTOLIC BLOOD PRESSURE: 78 MMHG | BODY MASS INDEX: 39.01 KG/M2 | WEIGHT: 206.6 LBS

## 2019-10-07 DIAGNOSIS — G47.00 INSOMNIA, UNSPECIFIED TYPE: ICD-10-CM

## 2019-10-07 DIAGNOSIS — F42.2 MIXED OBSESSIONAL THOUGHTS AND ACTS: ICD-10-CM

## 2019-10-07 DIAGNOSIS — F41.9 ANXIETY DISORDER, UNSPECIFIED TYPE: Primary | ICD-10-CM

## 2019-10-07 DIAGNOSIS — F90.2 ADHD (ATTENTION DEFICIT HYPERACTIVITY DISORDER), COMBINED TYPE: ICD-10-CM

## 2019-10-07 DIAGNOSIS — F33.1 MODERATE EPISODE OF RECURRENT MAJOR DEPRESSIVE DISORDER (HCC): ICD-10-CM

## 2019-10-07 PROCEDURE — 90792 PSYCH DIAG EVAL W/MED SRVCS: CPT | Performed by: NURSE PRACTITIONER

## 2019-10-07 RX ORDER — OMEPRAZOLE 40 MG/1
40 CAPSULE, DELAYED RELEASE ORAL EVERY MORNING
Refills: 11 | COMMUNITY
Start: 2019-09-15 | End: 2020-09-04

## 2019-10-07 RX ORDER — HYDROXYZINE HYDROCHLORIDE 25 MG/1
TABLET, FILM COATED ORAL
Qty: 30 TABLET | Refills: 1 | Status: SHIPPED | OUTPATIENT
Start: 2019-10-07 | End: 2019-11-13 | Stop reason: SDUPTHER

## 2019-10-07 RX ORDER — IBUPROFEN 800 MG/1
800 TABLET ORAL EVERY 6 HOURS PRN
COMMUNITY
End: 2020-09-04

## 2019-10-07 RX ORDER — FLUOXETINE 10 MG/1
10 CAPSULE ORAL DAILY
Qty: 10 CAPSULE | Refills: 0 | Status: SHIPPED | OUTPATIENT
Start: 2019-10-07 | End: 2019-11-13

## 2019-10-07 RX ORDER — FLUOXETINE HYDROCHLORIDE 20 MG/1
20 CAPSULE ORAL DAILY
Qty: 30 CAPSULE | Refills: 1 | Status: SHIPPED | OUTPATIENT
Start: 2019-10-07 | End: 2019-11-13 | Stop reason: SDUPTHER

## 2019-10-07 RX ORDER — RANITIDINE 300 MG/1
300 TABLET ORAL
Refills: 11 | COMMUNITY
Start: 2019-09-15 | End: 2019-12-26

## 2019-10-07 RX ORDER — HYDROXYZINE HYDROCHLORIDE 25 MG/1
TABLET, FILM COATED ORAL
Qty: 30 TABLET | Refills: 1 | Status: SHIPPED | OUTPATIENT
Start: 2019-10-07 | End: 2019-10-07 | Stop reason: SDUPTHER

## 2019-10-07 RX ORDER — ERGOCALCIFEROL 1.25 MG/1
50000 CAPSULE ORAL WEEKLY
Refills: 0 | COMMUNITY
Start: 2019-09-15

## 2019-10-07 NOTE — PROGRESS NOTES
"Subjective   Yahir Garsia is a 39 y.o. female who is here today for initial appointment to evaluate for medication options. Self referral.     Chief Complaint:  Anxiety and ADHD    HPI: pt states she started having symptoms aprox 7-8 years ago.  This was all around the time she lost her grandmother.  Did not seek treatment until aprox 2 years ago when her daughter started having behavioral issues.  She has the following symptoms of depression: anhedonia, increased irritability, crying spells several times a week. See PHQ 9.  Denies any SI/HI/ or any A/V hallucinations. Currently rates depression a 7/10 with 10 being worse.  Rates anxiety the same.  Worries all the time.  Does a lot of \"what if\" thinking.   Body mass index is 39.06 kg/m².  Weight gain 26 lbs in a year.  Irrittable.  Impatient.  Has anger outbursts couple times a week.  Little things trigger them.  \"When no one will help her at work\". Outbursts consist of yelling or punching the wall.  She has fractured her hand twice in the past because of this.  Trouble sleeping.  Gets 3-4 hours sleep.  Pt does snore.  Wakes up with headache.  Has had sleep study which said she had ROYAL and needed C pap.  Could not tolerate it so does not wear it.  Says her mind will not turn off it is always racing.  Mood changes quickly.  No rosales behaviors.  Has to click light 3-4 times.  Has to check her phone alarm and bedside clock 3 times.  Has to check doors 3 times if she does not complete the compulsions it really irritates her.  Will repeat sets of 3 nine times before. She gets thoughts stuck in her head as well at times.  No flashbacks to previous events.  Can go into public but if it is crowded she has to leave.  Paranoid people are looking at her.  Says she has always been this way and has poor self image.  She has never done therapy.  Has trouble completing tasks.  Loses things frequently.  Has trouble concentrating and noises will bother her and distract her.  No " "history of seizure disorder or cardiac issues.  She has had total hysterectomy   History of Present Illness    Past Psych History:  Never formally diagnosed with anything.  States her PCP has treated her with numerous meds for anxiety and depression.  No inpatient hospitalizations.  No suicide attempts.    Previous Psych Meds:  zoloft made her sick.  Wellbutrin did not work.  Buspar and effexor did not work.  Lexapro helped some but she had to stop it after appendix removed and it made her sick when she restarted it.  .     Substance Abuse:  None.  Smokes 1/2 PPD cigarettes for 15 years.  Social ETOH.      Social History:  Born and raised in [place] by 2 parents. Had \"OK\" childhood.  Denies any physical, emotional, or sexual abuse as a child. Both parents still living.  2 sisters 1 being identical twin. Says she is close to sisters.  Graduated HS in [place].  No college.  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  Moved here in 2005.   in 2005.  Has 2 children ages 12 and 13.  Never incarcerated.  No pending legal issues.  No .  Believes in God.  Likes to spend time with family.  No actual hobbies.  Lives with  and 2 children.   trying for physical disability.         Family Psychiatric History:  family history includes Hypertension in her sister; No Known Problems in her father and mother; Obesity in her sister. Twin is on Zoloft for depression and anxiety.  Younger sister has depression and anxiety treated with zoloft.      Medical/Surgical History:  Past Medical History:   Diagnosis Date   • Anxiety    • Chronic laryngitis     HISTORY OF   • Depression    • Endometriosis    • Fatigue    • GERD (gastroesophageal reflux disease)     full history in HPI.  SEVERE, on H2 blocker only due ot insurance not covering PPI.  Loses employment opportunities due to hoarseness from reflux.    • Hyperlipidemia    • Hypertension    • " Kidney stones    • Migraine    • Prediabetes     HISTORY OF   • Sleep apnea     does not use CPAP due to intolerance   • Spinal headache      Past Surgical History:   Procedure Laterality Date   • ABDOMINAL SURGERY     • APPENDECTOMY N/A 2018    Procedure: APPENDECTOMY LAPAROSCOPIC;  Surgeon: Augustina Ayala MD;  [facility]:  [facility];  Service: General   • BRAVO PROCEDURE N/A 2017    Procedure: ESOPHAGOGASTRODUODENOSCOPY AND NAILS;  Surgeon: Morris Solares MD;  [facility]:  [facility];  Service:    • BRAVO PROCEDURE N/A 6/10/2019    Procedure: ESOPHAGOGASTRODUODENOSCOPY AND BRAVO; GE Junction at 36cm;  Surgeon: Morris Solares MD;  [facility]:  [facility];  Service: Gastroenterology   • CARDIOVASCULAR STRESS TEST  2017   •  SECTION PRIMARY      done under general anesthesia b/c couldn't get epidural   •  SECTION WITH TUBAL     • CYSTOSCOPY N/A 2018    Procedure: CYSTOSCOPY;  Surgeon: Ousmane Alvarez MD;  [facility]:  [facility];  Service: Urology   • DIAGNOSTIC LAPAROSCOPY      looking for endometriosis   • DILATATION AND CURETTAGE N/A 10/4/2016    Procedure: DILATATION AND CURETTAGE WITH FROZEN SECTION;  Surgeon: Rajendra Rico MD;  [facility]:  [facility];  for abnormal uterine bleeding   • ECHO - CONVERTED  2017   • ENDOMETRIAL FULGURATION     • ENDOSCOPY      in Dr. Beck Gongora   • EXTRACORPOREAL SHOCK WAVE LITHOTRIPSY (ESWL) Left 2018    Procedure: EXTRACORPOREAL SHOCKWAVE LITHOTRIPSY;  Surgeon: Ousmane Alvarez MD;  [facility]:  [facility];  Service: Urology   • HIATAL HERNIA REPAIR N/A 2018    Procedure: HIATAL HERNIA REPAIR LAPAROSCOPIC;  Surgeon: Morris Solares MD;  [facility];  Service:    • NISSEN FUNDOPLICATION N/A 2018    Procedure: NISSEN FUNDOPLICATION LAPAROSCOPIC ;  Surgeon: Morris Solares MD;  [facility];  Service:    • TOTAL LAPAROSCOPIC HYSTERECTOMY N/A 10/4/2016    Procedure: TOTAL LAPAROSCOPIC HYSTERECTOMY  BILATERAL SALPINGO OOPHERECTOMY WITH DAVINCI SI ROBOT;  Surgeon: Rajendra Rico MD;  [facility]:  [facility];  endometriosis/fibroids   • TUBAL ABDOMINAL LIGATION  2006       No Known Allergies        Current Medications:   Current Outpatient Medications   Medication Sig Dispense Refill   • Aspirin-Acetaminophen-Caffeine (EXCEDRIN PO) Take  by mouth As Needed.     • ibuprofen (ADVIL,MOTRIN) 800 MG tablet Take 800 mg by mouth Every 6 (Six) Hours As Needed for Mild Pain .     • lisinopril-hydrochlorothiazide (PRINZIDE,ZESTORETIC) 10-12.5 MG per tablet Take 1 tablet by mouth Daily.     • omeprazole (priLOSEC) 40 MG capsule Take 40 mg by mouth Every Morning.  11   • raNITIdine (ZANTAC) 300 MG tablet Take 300 mg by mouth every night at bedtime.  11   • vitamin D (ERGOCALCIFEROL) 59609 units capsule capsule Take 50,000 Units by mouth 1 (One) Time Per Week.  0   • FLUoxetine (PROzac) 10 MG capsule Take 1 capsule by mouth Daily. 10 capsule 0   • FLUoxetine (PROzac) 20 MG capsule Take 1 capsule by mouth Daily. Begin after the 10 days of 10mg 30 capsule 1   • hydrOXYzine (ATARAX) 25 MG tablet 1/2-1 tablet  po at HS 30 tablet 1     No current facility-administered medications for this visit.          Review of Systems   Constitutional: Positive for fatigue. Negative for activity change and appetite change.   HENT: Positive for voice change.    Eyes: Negative for visual disturbance.   Respiratory: Negative.    Cardiovascular: Negative.    Gastrointestinal: Negative for nausea.   Endocrine: Negative.    Genitourinary: Negative.    Musculoskeletal: Negative for arthralgias.   Skin: Negative.    Allergic/Immunologic: Negative.    Neurological: Negative for dizziness, seizures and headaches.   Hematological: Negative.    Psychiatric/Behavioral: Positive for decreased concentration and sleep disturbance. Negative for agitation, behavioral problems, confusion, dysphoric mood, hallucinations, self-injury and suicidal ideas. The patient  "is nervous/anxious. The patient is not hyperactive.     denies HEENT, cardiovascular, respiratory, liver, renal, GI/, endocrine, neuro, DERM, hematology, immunology, musculoskeletal disorders.    Objective   Physical Exam  Blood pressure 122/78, pulse 89, height 154.9 cm (60.98\"), weight 93.7 kg (206 lb 9.6 oz), last menstrual period 09/30/2016, not currently breastfeeding.    Mental Status Exam:   Hygiene:   good  Cooperation:  Cooperative  Eye Contact:  Good  Psychomotor Behavior:  Restless  Affect:  Full range  Hopelessness: Denies  Speech:  Normal  Thought Process:  Goal directed  Thought Content:  Normal  Suicidal:  None  Homicidal:  None  Hallucinations:  None  Delusion:  None  Memory:  Intact  Orientation:  Person, Place, Time and Situation  Reliability:  good  Insight:  Good  Judgement:  Good  Impulse Control:  Fair  Physical/Medical Issues:  Yes gerd, Htn      Short-term goals: Patient will be compliant with clinic appointments.  Patient will be engaged in therapy, medication compliant with minimal side effects. Patient  will report decrease of symptoms and frequency.    Long-term goals: Patient will have minimal symptoms of  with continued medication management. Patient will be compliant with treatment and appointments.       Problem list:   Strengths: personality, work ethic  Weaknesses: coping skills    Assessment/Plan   Problems Addressed this Visit     None      Visit Diagnoses     Anxiety disorder, unspecified type    -  Primary    Relevant Medications    FLUoxetine (PROzac) 10 MG capsule    FLUoxetine (PROzac) 20 MG capsule    hydrOXYzine (ATARAX) 25 MG tablet    Other Relevant Orders    CBC & Differential    Comprehensive Metabolic Panel    TSH    Moderate episode of recurrent major depressive disorder (CMS/HCC)        Relevant Medications    FLUoxetine (PROzac) 10 MG capsule    FLUoxetine (PROzac) 20 MG capsule    hydrOXYzine (ATARAX) 25 MG tablet    Other Relevant Orders    CBC & Differential    " Comprehensive Metabolic Panel    TSH    ADHD (attention deficit hyperactivity disorder), combined type        Relevant Medications    FLUoxetine (PROzac) 10 MG capsule    FLUoxetine (PROzac) 20 MG capsule    hydrOXYzine (ATARAX) 25 MG tablet    Mixed obsessional thoughts and acts        Relevant Medications    FLUoxetine (PROzac) 10 MG capsule    FLUoxetine (PROzac) 20 MG capsule    Insomnia, unspecified type        Relevant Medications    hydrOXYzine (ATARAX) 25 MG tablet        · Functionality: pt having significant impairment in important areas of daily functioning.  Prognosis:  good dependent on medication/follow up and treatment plan compliance.  PHQ 9 score 20 indicating moderate severe depression  CPT 3 test administered shows 3 atypical T scores indicating moderate likelihood ADHd  ADHD adult symptom check list shows indicating borderline on ADHD  I had lengthy discussion with patient.  She shows ADHD on the Cpt 3 however with the interview it does not seem to be affecting her to the point it needs treated.  Also the depression and anxiety seem to be the main problem at this time.  She does exhibit some OCD behavior but this is also not disrupting her life too much.  I think the anxiety makes this worse.  She agrees.  I also discussed with her that her ROYAL needs treated and I will give her some hydroxyzine however if the ROYAL is not treated nothing will help her sleep.  She states she really wants to lose weight and does not want another sleep evaluation.    Discussed medication options.  Begin prozac.  Discussed the risks, benefits, and side effects of the medication including vivid dreams and sexual issues.  Also that atarax can cause drowsiness.  ; client acknowledged and verbally consented.  She has had total hysterectomy.  Offered therapy regarding the anxiety however she wants to try the medication first. Patient is aware to contact the [facility] with any worsening of symptom.  Patient is agreeable  to go to the ER or call 911 should they begin SI/HI.     Return in 4 weeks.        This document has been electronically signed by ЮЛИЯ Montano on   October 7, 2019 2:22 PM.

## 2019-10-18 ENCOUNTER — LAB (OUTPATIENT)
Dept: FAMILY MEDICINE CLINIC | Facility: CLINIC | Age: 39
End: 2019-10-18

## 2019-10-18 DIAGNOSIS — F33.1 MODERATE EPISODE OF RECURRENT MAJOR DEPRESSIVE DISORDER (HCC): ICD-10-CM

## 2019-10-18 DIAGNOSIS — F41.9 ANXIETY DISORDER, UNSPECIFIED TYPE: ICD-10-CM

## 2019-10-18 PROCEDURE — 80050 GENERAL HEALTH PANEL: CPT | Performed by: NURSE PRACTITIONER

## 2019-10-19 LAB
ALBUMIN SERPL-MCNC: 4 G/DL (ref 3.5–5.2)
ALBUMIN/GLOB SERPL: 1.2 G/DL
ALP SERPL-CCNC: 101 U/L (ref 39–117)
ALT SERPL W P-5'-P-CCNC: 12 U/L (ref 1–33)
ANION GAP SERPL CALCULATED.3IONS-SCNC: 9.3 MMOL/L (ref 5–15)
AST SERPL-CCNC: 14 U/L (ref 1–32)
BASOPHILS # BLD AUTO: 0.06 10*3/MM3 (ref 0–0.2)
BASOPHILS NFR BLD AUTO: 0.9 % (ref 0–1.5)
BILIRUB SERPL-MCNC: 0.5 MG/DL (ref 0.2–1.2)
BUN BLD-MCNC: 12 MG/DL (ref 6–20)
BUN/CREAT SERPL: 14.8 (ref 7–25)
CALCIUM SPEC-SCNC: 9.2 MG/DL (ref 8.6–10.5)
CHLORIDE SERPL-SCNC: 102 MMOL/L (ref 98–107)
CO2 SERPL-SCNC: 27.7 MMOL/L (ref 22–29)
CREAT BLD-MCNC: 0.81 MG/DL (ref 0.57–1)
DEPRECATED RDW RBC AUTO: 40.6 FL (ref 37–54)
EOSINOPHIL # BLD AUTO: 0.14 10*3/MM3 (ref 0–0.4)
EOSINOPHIL NFR BLD AUTO: 2.1 % (ref 0.3–6.2)
ERYTHROCYTE [DISTWIDTH] IN BLOOD BY AUTOMATED COUNT: 12.3 % (ref 12.3–15.4)
GFR SERPL CREATININE-BSD FRML MDRD: 79 ML/MIN/1.73
GLOBULIN UR ELPH-MCNC: 3.3 GM/DL
GLUCOSE BLD-MCNC: 87 MG/DL (ref 65–99)
HCT VFR BLD AUTO: 39.7 % (ref 34–46.6)
HGB BLD-MCNC: 13.7 G/DL (ref 12–15.9)
IMM GRANULOCYTES # BLD AUTO: 0.02 10*3/MM3 (ref 0–0.05)
IMM GRANULOCYTES NFR BLD AUTO: 0.3 % (ref 0–0.5)
LYMPHOCYTES # BLD AUTO: 2.33 10*3/MM3 (ref 0.7–3.1)
LYMPHOCYTES NFR BLD AUTO: 35.3 % (ref 19.6–45.3)
MCH RBC QN AUTO: 31.4 PG (ref 26.6–33)
MCHC RBC AUTO-ENTMCNC: 34.5 G/DL (ref 31.5–35.7)
MCV RBC AUTO: 90.8 FL (ref 79–97)
MONOCYTES # BLD AUTO: 0.39 10*3/MM3 (ref 0.1–0.9)
MONOCYTES NFR BLD AUTO: 5.9 % (ref 5–12)
NEUTROPHILS # BLD AUTO: 3.66 10*3/MM3 (ref 1.7–7)
NEUTROPHILS NFR BLD AUTO: 55.5 % (ref 42.7–76)
NRBC BLD AUTO-RTO: 0 /100 WBC (ref 0–0.2)
PLATELET # BLD AUTO: 343 10*3/MM3 (ref 140–450)
PMV BLD AUTO: 10.2 FL (ref 6–12)
POTASSIUM BLD-SCNC: 4.5 MMOL/L (ref 3.5–5.2)
PROT SERPL-MCNC: 7.3 G/DL (ref 6–8.5)
RBC # BLD AUTO: 4.37 10*6/MM3 (ref 3.77–5.28)
SODIUM BLD-SCNC: 139 MMOL/L (ref 136–145)
TSH SERPL DL<=0.05 MIU/L-ACNC: 0.94 UIU/ML (ref 0.27–4.2)
WBC NRBC COR # BLD: 6.6 10*3/MM3 (ref 3.4–10.8)

## 2019-11-13 ENCOUNTER — OFFICE VISIT (OUTPATIENT)
Dept: PSYCHIATRY | Facility: CLINIC | Age: 39
End: 2019-11-13

## 2019-11-13 VITALS
BODY MASS INDEX: 38.71 KG/M2 | HEART RATE: 77 BPM | SYSTOLIC BLOOD PRESSURE: 120 MMHG | HEIGHT: 61 IN | DIASTOLIC BLOOD PRESSURE: 74 MMHG | WEIGHT: 205 LBS

## 2019-11-13 DIAGNOSIS — F90.2 ADHD (ATTENTION DEFICIT HYPERACTIVITY DISORDER), COMBINED TYPE: ICD-10-CM

## 2019-11-13 DIAGNOSIS — G47.00 INSOMNIA, UNSPECIFIED TYPE: ICD-10-CM

## 2019-11-13 DIAGNOSIS — F41.9 ANXIETY DISORDER, UNSPECIFIED TYPE: Primary | ICD-10-CM

## 2019-11-13 DIAGNOSIS — F42.2 MIXED OBSESSIONAL THOUGHTS AND ACTS: ICD-10-CM

## 2019-11-13 DIAGNOSIS — F33.1 MODERATE EPISODE OF RECURRENT MAJOR DEPRESSIVE DISORDER (HCC): ICD-10-CM

## 2019-11-13 PROCEDURE — 99214 OFFICE O/P EST MOD 30 MIN: CPT | Performed by: NURSE PRACTITIONER

## 2019-11-13 RX ORDER — FLUOXETINE HYDROCHLORIDE 40 MG/1
40 CAPSULE ORAL DAILY
Qty: 30 CAPSULE | Refills: 2 | Status: SHIPPED | OUTPATIENT
Start: 2019-11-13 | End: 2020-01-30 | Stop reason: SDUPTHER

## 2019-11-13 RX ORDER — HYDROXYZINE HYDROCHLORIDE 25 MG/1
TABLET, FILM COATED ORAL
Qty: 30 TABLET | Refills: 2 | Status: SHIPPED | OUTPATIENT
Start: 2019-11-13 | End: 2019-12-26

## 2019-11-13 NOTE — PROGRESS NOTES
Subjective   Yahir Garsia is a 39 y.o. female is here today for medication management follow-up.    Chief Complaint:  Recheck on depression and anxiety  History of Present Illness: She presents for her follow-up appointment at the [facility].  She states that she can tell the Prozac has helped a little.  She now rates depression and anxiety both approximately a 5 out of 10 with 10 being worse.  Denies any suicidal thoughts, homicidal thoughts, or any AV hallucinations.  She is continuing to have the OCD behavior of performing things in threes.  She states that she feels the medication has also helped with her irritability slightly.  She is sleeping well at night without difficulty.  Mood is stable.  Still gets agitated but may be not as often.  She denies any negative side effects to the medication.  She is only having to use the Atarax rarely.Body mass index is 38.75 kg/m². weight loss 1 lb since last visit.  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  She is going to have surgery in December for this and this does cause increased anxiety right now that is more situational.    The following portions of the patient's history were reviewed and updated as appropriate: allergies, current medications, past family history, past medical history, past social history, past surgical history and problem list.    Review of Systems   Constitutional: Negative for activity change, appetite change and fatigue.   HENT: Positive for voice change.    Eyes: Negative for visual disturbance.   Respiratory: Negative.    Cardiovascular: Negative.    Gastrointestinal: Negative for nausea.   Endocrine: Negative.    Genitourinary: Negative.    Musculoskeletal: Negative for arthralgias.   Skin: Negative.    Allergic/Immunologic: Negative.    Neurological: Negative for dizziness, seizures and headaches.   Hematological: Negative.    Psychiatric/Behavioral: Negative for agitation, behavioral problems,  "confusion, decreased concentration, dysphoric mood, hallucinations, self-injury, sleep disturbance and suicidal ideas. The patient is nervous/anxious. The patient is not hyperactive.        Objective   Physical Exam   Constitutional: She is oriented to person, place, and time. She appears well-developed and well-nourished.   HENT:   Head: Normocephalic and atraumatic.   Neurological: She is alert and oriented to person, place, and time.   Psychiatric: She has a normal mood and affect. Her speech is normal and behavior is normal. Judgment and thought content normal. Cognition and memory are normal.   Pleasant and engaging   Vitals reviewed.    Blood pressure 120/74, pulse 77, height 154.9 cm (60.98\"), weight 93 kg (205 lb), last menstrual period 09/30/2016, not currently breastfeeding.    Medication List:   Current Outpatient Medications   Medication Sig Dispense Refill   • Aspirin-Acetaminophen-Caffeine (EXCEDRIN PO) Take  by mouth As Needed.     • FLUoxetine (PROzac) 40 MG capsule Take 1 capsule by mouth Daily. 30 capsule 2   • hydrOXYzine (ATARAX) 25 MG tablet 1/2-1 tablet  po at HS 30 tablet 2   • ibuprofen (ADVIL,MOTRIN) 800 MG tablet Take 800 mg by mouth Every 6 (Six) Hours As Needed for Mild Pain .     • lisinopril-hydrochlorothiazide (PRINZIDE,ZESTORETIC) 10-12.5 MG per tablet Take 1 tablet by mouth Daily.     • omeprazole (priLOSEC) 40 MG capsule Take 40 mg by mouth Every Morning.  11   • raNITIdine (ZANTAC) 300 MG tablet Take 300 mg by mouth every night at bedtime.  11   • vitamin D (ERGOCALCIFEROL) 14396 units capsule capsule Take 50,000 Units by mouth 1 (One) Time Per Week.  0     No current facility-administered medications for this visit.        Mental Status Exam:   Hygiene:   good  Cooperation:  Cooperative  Eye Contact:  Good  Psychomotor Behavior:  Appropriate  Affect:  Full range  Hopelessness: Denies  Speech:  raspy voice  Thought Process:  Goal directed  Thought Content:  Normal  Suicidal:  " None  Homicidal:  None  Hallucinations:  None  Delusion:  None  Memory:  Intact  Orientation:  Person, Place, Time and Situation  Reliability:  good  Insight:  Good  Judgement:  Good  Impulse Control:  Fair  Physical/Medical Issues:  Yes htn    Assessment/Plan   Problems Addressed this Visit     None      Visit Diagnoses     Anxiety disorder, unspecified type    -  Primary    Relevant Medications    FLUoxetine (PROzac) 40 MG capsule    hydrOXYzine (ATARAX) 25 MG tablet    Moderate episode of recurrent major depressive disorder (CMS/HCC)        Relevant Medications    FLUoxetine (PROzac) 40 MG capsule    hydrOXYzine (ATARAX) 25 MG tablet    ADHD (attention deficit hyperactivity disorder), combined type        Relevant Medications    FLUoxetine (PROzac) 40 MG capsule    hydrOXYzine (ATARAX) 25 MG tablet    Mixed obsessional thoughts and acts        Relevant Medications    FLUoxetine (PROzac) 40 MG capsule    Insomnia, unspecified type        Relevant Medications    hydrOXYzine (ATARAX) 25 MG tablet        Functionality: pt having significant impairment in important areas of daily functioning.  Prognosis: Guarded dependent on medication/follow up and treatment plan compliance.      Discussed medication options.  I am increasing her Prozac to 40 mg today.  Still does not wish to see a therapist.Continuing efforts to promote the therapeutic alliance, address the patient's issues, and strengthen self awareness, insights, and coping skills  Patient acknowledged and verbally consented.  Patient is agreeable to call the [facility].  Patient is aware to call 911 or go to the nearest ER should begin having SI/HI. rtc 1 month             This document has been electronically signed by ЮЛИЯ Montano on   November 13, 2019 12:26 PM.

## 2019-11-13 NOTE — TREATMENT PLAN
Multi-Disciplinary Problems (from Behavioral Health Treatment Plan)    Active Problems     Problem: Anxiety  Start Date: 11/13/19    Problem Details:  The patient self-scales this problem as a 3 with 10 being the worst.       Goal Priority Start Date Expected End Date End Date    Patient will develop and implement behavioral and cognitive strategies to reduce anxiety and irrational fears. -- 11/13/19 -- --    Goal Details:  Progress toward goal:  The patient self-scales their progress related to this goal as a 3 with 10 being the worst.       Goal Intervention Frequency Start Date End Date    Help patient explore past emotional issues in relation to present anxiety. Weekly 11/13/19 --    Intervention Details:  Duration of treatment until until remission of symptoms.       Goal Intervention Frequency Start Date End Date    Help patient develop an awareness of their cognitive and physical responses to anxiety. Weekly 11/13/19 --    Intervention Details:  Duration of treatment until until remission of symptoms.             Problem: Depression  Start Date: 11/13/19    Problem Details:  The patient self-scales this problem as a 3 with 10 being the worst.       Goal Priority Start Date Expected End Date End Date    Patient will demonstrate the ability to initiate new constructive life skills outside of sessions on a consistent basis. -- 11/13/19 -- --    Goal Details:  Progress toward goal:  The patient self-scales their progress related to this goal as a 3 with 10 being the worst.       Goal Intervention Frequency Start Date End Date    Assist patient in setting attainable activities of daily living goals. PRN 11/13/19 --    Goal Intervention Frequency Start Date End Date    Provide education about depression Weekly 11/13/19 --    Intervention Details:  Duration of treatment until until remission of symptoms.       Goal Intervention Frequency Start Date End Date    Assist patient in developing healthy coping strategies.  Weekly 11/13/19 --    Intervention Details:  Duration of treatment until until remission of symptoms.                          I have discussed and reviewed this treatment plan with the patient.

## 2019-12-26 ENCOUNTER — OFFICE VISIT (OUTPATIENT)
Dept: UROLOGY | Facility: CLINIC | Age: 39
End: 2019-12-26

## 2019-12-26 ENCOUNTER — HOSPITAL ENCOUNTER (OUTPATIENT)
Dept: GENERAL RADIOLOGY | Facility: HOSPITAL | Age: 39
Discharge: HOME | End: 2019-12-26
Admitting: UROLOGY

## 2019-12-26 VITALS — BODY MASS INDEX: 38.73 KG/M2 | HEIGHT: 61 IN

## 2019-12-26 DIAGNOSIS — N20.0 KIDNEY STONE: Primary | ICD-10-CM

## 2019-12-26 DIAGNOSIS — M53.87 SCIATICA ASSOCIATED WITH DISORDER OF LUMBOSACRAL SPINE: ICD-10-CM

## 2019-12-26 PROCEDURE — 99213 OFFICE O/P EST LOW 20 MIN: CPT | Performed by: UROLOGY

## 2019-12-26 PROCEDURE — 74018 RADEX ABDOMEN 1 VIEW: CPT

## 2019-12-26 PROCEDURE — 74018 RADEX ABDOMEN 1 VIEW: CPT | Performed by: RADIOLOGY

## 2019-12-26 RX ORDER — OXYCODONE AND ACETAMINOPHEN 10; 325 MG/1; MG/1
1 TABLET ORAL EVERY 6 HOURS PRN
Qty: 12 TABLET | Refills: 0 | OUTPATIENT
Start: 2019-12-26 | End: 2020-09-04

## 2019-12-26 NOTE — PROGRESS NOTES
Chief Complaint:          Chief Complaint   Patient presents with   • Nephrolithiasis     follow up        HPI:   39 y.o. female returns today with a left midpole stone history and had a CT KUB.  She had it treated.  She is here for follow-up having severe pain gross hematuria history of stones her urine was positive for blood her CT showed acute appendicitis today she had positive straight leg raising on the left I do not believe this is a stone I gave her reassurance recommended warm soaks pain medication      Past Medical History:        Past Medical History:   Diagnosis Date   • Anxiety    • Chronic laryngitis     HISTORY OF   • Depression    • Endometriosis    • Fatigue    • GERD (gastroesophageal reflux disease)     full history in HPI.  SEVERE, on H2 blocker only due ot insurance not covering PPI.  Loses employment opportunities due to hoarseness from reflux.    • Hyperlipidemia    • Hypertension    • Kidney stones    • Migraine    • Prediabetes     HISTORY OF   • Sleep apnea     does not use CPAP due to intolerance   • Spinal headache          Current Meds:     Current Outpatient Medications   Medication Sig Dispense Refill   • FLUoxetine (PROzac) 40 MG capsule Take 1 capsule by mouth Daily. 30 capsule 2   • ibuprofen (ADVIL,MOTRIN) 800 MG tablet Take 800 mg by mouth Every 6 (Six) Hours As Needed for Mild Pain .     • omeprazole (priLOSEC) 40 MG capsule Take 40 mg by mouth Every Morning.  11   • vitamin D (ERGOCALCIFEROL) 93595 units capsule capsule Take 50,000 Units by mouth 1 (One) Time Per Week.  0     No current facility-administered medications for this visit.         Allergies:      No Known Allergies     Past Surgical History:     Past Surgical History:   Procedure Laterality Date   • ABDOMINAL SURGERY     • APPENDECTOMY N/A 12/14/2018    Procedure: APPENDECTOMY LAPAROSCOPIC;  Surgeon: Augustina Ayala MD;  Location: [facility];  Service: General   • BRAVO PROCEDURE N/A 5/8/2017    Procedure:  ESOPHAGOGASTRODUODENOSCOPY AND NAILS;  Surgeon: Morris Solares MD;  Location:  [facility] OR;  Service:    • BRAVO PROCEDURE N/A 6/10/2019    Procedure: ESOPHAGOGASTRODUODENOSCOPY AND BRAVO; GE Junction at 36cm;  Surgeon: Morris Solares MD;  Location: [facility] OR;  Service: Gastroenterology   • CARDIOVASCULAR STRESS TEST  2017   •  SECTION PRIMARY      done under general anesthesia b/c couldn't get epidural   •  SECTION WITH TUBAL     • CYSTOSCOPY N/A 2018    Procedure: CYSTOSCOPY;  Surgeon: Ousmane Alvarez MD;  Location:  [facility] OR;  Service: Urology   • DIAGNOSTIC LAPAROSCOPY      looking for endometriosis   • DILATATION AND CURETTAGE N/A 10/4/2016    Procedure: DILATATION AND CURETTAGE WITH FROZEN SECTION;  Surgeon: Rajendra Rico MD;  Location:  [facility] OR;  for abnormal uterine bleeding   • ECHO - CONVERTED  2017   • ENDOMETRIAL FULGURATION     • ENDOSCOPY      in [facility]   • EXTRACORPOREAL SHOCK WAVE LITHOTRIPSY (ESWL) Left 2018    Procedure: EXTRACORPOREAL SHOCKWAVE LITHOTRIPSY;  Surgeon: Ousmane Alvarez MD;  Location: [facility] OR;  Service: Urology   • HIATAL HERNIA REPAIR N/A 2018    Procedure: HIATAL HERNIA REPAIR LAPAROSCOPIC;  Surgeon: Morris Solares MD;  Location: [facility] OR;  Service:    • NISSEN FUNDOPLICATION N/A 2018    Procedure: NISSEN FUNDOPLICATION LAPAROSCOPIC ;  Surgeon: Morris Solares MD;  Location: [facility] OR;  Service:    • TOTAL LAPAROSCOPIC HYSTERECTOMY N/A 10/4/2016    Procedure: TOTAL LAPAROSCOPIC HYSTERECTOMY BILATERAL SALPINGO OOPHERECTOMY WITH DAVINCI SI ROBOT;  Surgeon: Rajendra Rico MD;  Location:  [facility] OR;  endometriosis/fibroids   • TUBAL ABDOMINAL LIGATION           Social History:     Social History     Socioeconomic History   • Marital status:      Spouse name: Heath Garsia   • Number of children: 2   • Years of education: high School [employer]    • Highest education level: Not on file    Occupational History   • Occupation: Unemployed    Tobacco Use   • Smoking status: Current Some Day Smoker     Packs/day: 0.50     Years: 12.00     Pack years: 6.00     Types: Cigarettes   • Smokeless tobacco: Never Used   Substance and Sexual Activity   • Alcohol use: Yes     Comment: occasional   • Drug use: No   • Sexual activity: Defer     Partners: Male     Birth control/protection: Surgical     Comment:     Social History Narrative    Lives with  and children.  Unemployed due to not being able to find work due to hoarse voice.  Denied disability due to hoarseness not that severe.         Family History:     Family History   Problem Relation Age of Onset   • No Known Problems Mother    • No Known Problems Father    • Obesity Sister    • Hypertension Sister        Review of Systems:     Review of Systems   Constitutional: Negative.  Negative for activity change, appetite change, chills, diaphoresis, fatigue and unexpected weight change.   HENT: Negative for congestion, dental problem, drooling, ear discharge, ear pain, facial swelling, hearing loss, mouth sores, nosebleeds, postnasal drip, rhinorrhea, sinus pressure, sneezing, sore throat, tinnitus, trouble swallowing and voice change.    Eyes: Negative.  Negative for photophobia, pain, discharge, redness, itching and visual disturbance.   Respiratory: Negative.  Negative for apnea, cough, choking, chest tightness, shortness of breath, wheezing and stridor.    Cardiovascular: Negative.  Negative for chest pain, palpitations and leg swelling.   Gastrointestinal: Negative.  Negative for abdominal distention, abdominal pain, anal bleeding, blood in stool, constipation, diarrhea, nausea, rectal pain and vomiting.   Endocrine: Negative.  Negative for cold intolerance, heat intolerance, polydipsia, polyphagia and polyuria.   Genitourinary: Positive for flank pain.   Musculoskeletal: Negative for arthralgias, back pain, gait problem, joint swelling,  myalgias, neck pain and neck stiffness.   Skin: Negative.  Negative for color change, pallor, rash and wound.   Allergic/Immunologic: Negative.  Negative for environmental allergies, food allergies and immunocompromised state.   Neurological: Negative.  Negative for dizziness, tremors, seizures, syncope, facial asymmetry, speech difficulty, weakness, light-headedness, numbness and headaches.   Hematological: Negative.  Negative for adenopathy. Does not bruise/bleed easily.   Psychiatric/Behavioral: Negative for agitation, behavioral problems, confusion, decreased concentration, dysphoric mood, hallucinations, self-injury, sleep disturbance and suicidal ideas. The patient is not nervous/anxious and is not hyperactive.    All other systems reviewed and are negative.      Physical Exam:     Physical Exam   Constitutional: She appears well-developed and well-nourished.   HENT:   Head: Normocephalic and atraumatic.   Right Ear: External ear normal.   Left Ear: External ear normal.   Mouth/Throat: Oropharynx is clear and moist.   Eyes: Pupils are equal, round, and reactive to light. Conjunctivae are normal.   Cardiovascular: Normal rate, regular rhythm, normal heart sounds and intact distal pulses.   Pulmonary/Chest: Effort normal and breath sounds normal.   Abdominal: Soft. Bowel sounds are normal. She exhibits no distension and no mass. There is no tenderness. There is no rebound and no guarding.   Genitourinary: No vaginal discharge found.   Genitourinary Comments: Positive straight leg raising on her left leg   Musculoskeletal: Normal range of motion.   Neurological: She is alert. She has normal reflexes.   Skin: Skin is warm and dry.   Psychiatric: She has a normal mood and affect. Her behavior is normal. Judgment and thought content normal.       I have reviewed the following portions of the patient's history: allergies, current medications, past family history, past medical history, past social history, past  surgical history, problem list and ROS and confirm it's accurate.      Procedure:       Assessment/Plan:   Left L-5 S-1 Sciatica-recommend warm soaks elevation etc.  Renal calculus-we discussed the presence of the stone we discussed the various therapeutic options available including percutaneous nephrostolithotomy, lithotripsy.  We discussed the risks of lithotripsy including the passage of stones the development of a large string of stones in the distal ureter known as Steinstrasse.  In the 3% incidence of that we will need to proceed with a ureteroscopy for obstructing fragments.  Extremely rare incidence of renal hematoma.  And the significance of this.  We discussed the absolute relative indicators for intervention including the presence of sepsis, and pain we cannot control is the primary need for urgent intervention.  We discussed placement of a stent if indicated and the management of the stent as well.  Status post a successful lithotripsy with a negative KUB today.  Narcotic pain medication-patient has significant acute pain that I believe would be an indication for the use of narcotic pain medication.  I discussed the significant risks of pain medication and the fact that this will be a short only option and I will give her no more than a three-day supply of pain medication.  And I will not plan long-term medication and that this will be sent to a pain clinic if at all becomes necessary.  We discussed signing a pain medication agreement and the fact that we're going to run a state [facility] review to be sure the patient is not getting pain medication from elsewhere.  If this is the case we will not give pain medication.  As part of the patient's treatment plan of there being prescribed a controlled substance with potential for abuse.  This patient has been wait aware of the appropriate dose of such medications including, the risk for somnolence, limited ability to drive and/or safety and the significant  potential for overdose.  It is been made clear that these medications are for the prescribed patient only without concomitant use of alcohol or other sepsis unless prescribed by the medical provider.  Has completed prescribing agreement detailing the terms of continue prescribing him a controlled substance.  Including monitoring Bakari ports, the possibility of urine drug screens and pedal counts.  The patient is aware that we reviewed BAKARI reports on a regular basis and scan them into the chart.  History and physical examination exhibited continued safe and appropriate use of controlled substances.  Also discussed the fact that the new [place] legislation allows only a three-day prescription for pain medication.  In this situation he will be referred to a chronic pain clinic.          Patient's Body mass index is 38.73 kg/m². BMI is above normal parameters. Recommendations include: educational material.              This document has been electronically signed by BEN BAEZ MD December 26, 2019 1:41 PM

## 2019-12-30 ENCOUNTER — PRIOR AUTHORIZATION (OUTPATIENT)
Dept: UROLOGY | Facility: CLINIC | Age: 39
End: 2019-12-30

## 2019-12-30 PROBLEM — M53.87 SCIATICA ASSOCIATED WITH DISORDER OF LUMBOSACRAL SPINE: Status: ACTIVE | Noted: 2019-12-30

## 2020-01-30 ENCOUNTER — OFFICE VISIT (OUTPATIENT)
Dept: PSYCHIATRY | Facility: CLINIC | Age: 40
End: 2020-01-30

## 2020-01-30 VITALS
HEART RATE: 84 BPM | WEIGHT: 209 LBS | SYSTOLIC BLOOD PRESSURE: 118 MMHG | HEIGHT: 61 IN | BODY MASS INDEX: 39.46 KG/M2 | DIASTOLIC BLOOD PRESSURE: 82 MMHG

## 2020-01-30 DIAGNOSIS — F90.2 ADHD (ATTENTION DEFICIT HYPERACTIVITY DISORDER), COMBINED TYPE: ICD-10-CM

## 2020-01-30 DIAGNOSIS — F41.9 ANXIETY DISORDER, UNSPECIFIED TYPE: Primary | ICD-10-CM

## 2020-01-30 DIAGNOSIS — F33.1 MODERATE EPISODE OF RECURRENT MAJOR DEPRESSIVE DISORDER (HCC): ICD-10-CM

## 2020-01-30 DIAGNOSIS — F42.2 MIXED OBSESSIONAL THOUGHTS AND ACTS: ICD-10-CM

## 2020-01-30 PROCEDURE — 99214 OFFICE O/P EST MOD 30 MIN: CPT | Performed by: NURSE PRACTITIONER

## 2020-01-30 RX ORDER — FLUOXETINE HYDROCHLORIDE 40 MG/1
40 CAPSULE ORAL DAILY
Qty: 30 CAPSULE | Refills: 2 | Status: SHIPPED | OUTPATIENT
Start: 2020-01-30 | End: 2020-03-02 | Stop reason: SDUPTHER

## 2020-01-30 NOTE — PROGRESS NOTES
"      Subjective   Yahir Garsia is a 39 y.o. female is here today for medication management follow-up.    Chief Complaint:  Recheck on depression and anxiety  History of Present Illness: She presents for her follow-up appointment at the [facility].  She has left her  and now lives in new apartment with her children.  She continues to work at gas station.  The change to new dwelling has caused the OCD to flare.  She is still doing \"everything in 3s\" and feels like it is currently a little worse.  Rates depression and anxiety a 6/10 with 10 being worse.  Denies any SI, HI, or any AVH.  No negative side effects to the meds.  Sleep is inconsistent right now and she thinks it is due to living in new home.  Body mass index is 39.51 kg/m². weight gain 4 lbs since last visit.  No medical stressors.  Has had throat surgery and is doing well.  Mood is stable.  Right now she does not feel she needs any meds for ADHD.  Does have problems with focus and concentration at times but not causing any distress. She says she and  were fighting too much in front of kids so she knows this was good move on her part.          The following portions of the patient's history were reviewed and updated as appropriate: allergies, current medications, past family history, past medical history, past social history, past surgical history and problem list.    Review of Systems   Constitutional: Negative for activity change, appetite change and fatigue.   HENT: Negative for voice change.    Eyes: Negative for visual disturbance.   Respiratory: Negative.    Cardiovascular: Negative.    Gastrointestinal: Negative for nausea.   Endocrine: Negative.    Genitourinary: Negative.    Musculoskeletal: Negative for arthralgias.   Skin: Negative.    Allergic/Immunologic: Negative.    Neurological: Negative for dizziness, seizures and headaches.   Hematological: Negative.    Psychiatric/Behavioral: Positive for sleep " "disturbance. Negative for agitation, behavioral problems, confusion, decreased concentration, dysphoric mood, hallucinations, self-injury and suicidal ideas. The patient is nervous/anxious. The patient is not hyperactive.        Objective   Physical Exam   Constitutional: She is oriented to person, place, and time. She appears well-developed and well-nourished.   HENT:   Head: Normocephalic and atraumatic.   Neurological: She is alert and oriented to person, place, and time.   Psychiatric: She has a normal mood and affect. Her speech is normal and behavior is normal. Judgment and thought content normal. Cognition and memory are normal.   Pleasant and engaging   Vitals reviewed.    Blood pressure 118/82, pulse 84, height 154.9 cm (60.98\"), weight 94.8 kg (209 lb), last menstrual period 09/30/2016, not currently breastfeeding.    Medication List:   Current Outpatient Medications   Medication Sig Dispense Refill   • FLUoxetine (PROzac) 40 MG capsule Take 1 capsule by mouth Daily. 30 capsule 2   • ibuprofen (ADVIL,MOTRIN) 800 MG tablet Take 800 mg by mouth Every 6 (Six) Hours As Needed for Mild Pain .     • omeprazole (priLOSEC) 40 MG capsule Take 40 mg by mouth Every Morning.  11   • vitamin D (ERGOCALCIFEROL) 21908 units capsule capsule Take 50,000 Units by mouth 1 (One) Time Per Week.  0   • oxyCODONE-acetaminophen (PERCOCET)  MG per tablet Take 1 tablet by mouth Every 6 (Six) Hours As Needed for Moderate Pain . 12 tablet 0     No current facility-administered medications for this visit.        Mental Status Exam:   Hygiene:   good  Cooperation:  Cooperative  Eye Contact:  Good  Psychomotor Behavior:  Appropriate  Affect:  Full range  Hopelessness: Denies  Speech:  raspy voice  Thought Process:  Goal directed  Thought Content:  Normal  Suicidal:  None  Homicidal:  None  Hallucinations:  None  Delusion:  None  Memory:  Intact  Orientation:  Person, Place, Time and Situation  Reliability:  good  Insight:  " Good  Judgement:  Good  Impulse Control:  Fair  Physical/Medical Issues:  Yes htn    Assessment/Plan   Problems Addressed this Visit     None      Visit Diagnoses     Anxiety disorder, unspecified type    -  Primary    Relevant Medications    FLUoxetine (PROzac) 40 MG capsule    Moderate episode of recurrent major depressive disorder (CMS/HCC)        Relevant Medications    FLUoxetine (PROzac) 40 MG capsule    ADHD (attention deficit hyperactivity disorder), combined type        Relevant Medications    FLUoxetine (PROzac) 40 MG capsule    Mixed obsessional thoughts and acts        Relevant Medications    FLUoxetine (PROzac) 40 MG capsule        Functionality: pt having significant impairment in important areas of daily functioning.  Prognosis: Guarded dependent on medication/follow up and treatment plan compliance.      Discussed medication options. I am continuing her on current med.  She has prescription for atarax at home and I have recommended it for sleep as needed.  She states she does not need a prescription for this today.  Motivational interviewing occurred in that pt is working and providing stable home for her children.   Still does not wish to see a therapist.Continuing efforts to promote the therapeutic alliance, address the patient's issues, and strengthen self awareness, insights, and coping skills  Patient acknowledged and verbally consented.  Patient is agreeable to call the [facility].  Patient is aware to call 911 or go to the nearest ER should begin having SI/HI. rtc 6 weeks.             This document has been electronically signed by ЮЛИЯ Montano on   January 30, 2020 8:43 AM.

## 2020-03-02 DIAGNOSIS — F42.2 MIXED OBSESSIONAL THOUGHTS AND ACTS: ICD-10-CM

## 2020-03-02 DIAGNOSIS — F41.9 ANXIETY DISORDER, UNSPECIFIED TYPE: ICD-10-CM

## 2020-03-02 DIAGNOSIS — F33.1 MODERATE EPISODE OF RECURRENT MAJOR DEPRESSIVE DISORDER (HCC): ICD-10-CM

## 2020-03-02 RX ORDER — FLUOXETINE HYDROCHLORIDE 40 MG/1
40 CAPSULE ORAL DAILY
Qty: 30 CAPSULE | Refills: 0 | Status: SHIPPED | OUTPATIENT
Start: 2020-03-02 | End: 2020-03-12 | Stop reason: SDUPTHER

## 2020-03-12 ENCOUNTER — OFFICE VISIT (OUTPATIENT)
Dept: PSYCHIATRY | Facility: CLINIC | Age: 40
End: 2020-03-12

## 2020-03-12 VITALS
HEIGHT: 61 IN | BODY MASS INDEX: 39.65 KG/M2 | SYSTOLIC BLOOD PRESSURE: 130 MMHG | HEART RATE: 90 BPM | DIASTOLIC BLOOD PRESSURE: 76 MMHG | WEIGHT: 210 LBS

## 2020-03-12 DIAGNOSIS — G47.00 INSOMNIA, UNSPECIFIED TYPE: ICD-10-CM

## 2020-03-12 DIAGNOSIS — F33.1 MODERATE EPISODE OF RECURRENT MAJOR DEPRESSIVE DISORDER (HCC): ICD-10-CM

## 2020-03-12 DIAGNOSIS — F42.2 MIXED OBSESSIONAL THOUGHTS AND ACTS: ICD-10-CM

## 2020-03-12 DIAGNOSIS — F41.9 ANXIETY DISORDER, UNSPECIFIED TYPE: Primary | ICD-10-CM

## 2020-03-12 DIAGNOSIS — F90.2 ADHD (ATTENTION DEFICIT HYPERACTIVITY DISORDER), COMBINED TYPE: ICD-10-CM

## 2020-03-12 PROCEDURE — 99214 OFFICE O/P EST MOD 30 MIN: CPT | Performed by: NURSE PRACTITIONER

## 2020-03-12 RX ORDER — FLUOXETINE HYDROCHLORIDE 40 MG/1
40 CAPSULE ORAL DAILY
Qty: 30 CAPSULE | Refills: 1 | Status: SHIPPED | OUTPATIENT
Start: 2020-03-12 | End: 2020-07-02 | Stop reason: SDUPTHER

## 2020-03-12 RX ORDER — CLONIDINE HYDROCHLORIDE 0.1 MG/1
0.1 TABLET ORAL NIGHTLY
Qty: 30 TABLET | Refills: 1 | Status: SHIPPED | OUTPATIENT
Start: 2020-03-12 | End: 2020-07-02 | Stop reason: SDUPTHER
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
## 2020-05-06 ENCOUNTER — APPOINTMENT (OUTPATIENT)
Dept: CT IMAGING | Facility: HOSPITAL | Age: 40
End: 2020-05-06

## 2020-05-06 ENCOUNTER — HOSPITAL ENCOUNTER (EMERGENCY)
Facility: HOSPITAL | Age: 40
Discharge: HOME | End: 2020-05-06
Attending: EMERGENCY MEDICINE | Admitting: EMERGENCY MEDICINE

## 2020-05-06 VITALS
BODY MASS INDEX: 40.59 KG/M2 | HEART RATE: 87 BPM | DIASTOLIC BLOOD PRESSURE: 92 MMHG | HEIGHT: 61 IN | RESPIRATION RATE: 18 BRPM | TEMPERATURE: 98 F | WEIGHT: 215 LBS | SYSTOLIC BLOOD PRESSURE: 143 MMHG | OXYGEN SATURATION: 98 %

## 2020-05-06 DIAGNOSIS — R10.9 RIGHT FLANK PAIN: ICD-10-CM

## 2020-05-06 DIAGNOSIS — N39.0 ACUTE LOWER UTI: Primary | ICD-10-CM

## 2020-05-06 LAB
6-ACETYL MORPHINE: NEGATIVE
ALBUMIN SERPL-MCNC: 3.81 G/DL (ref 3.5–5.2)
ALBUMIN/GLOB SERPL: 1.3 G/DL
ALP SERPL-CCNC: 113 U/L (ref 39–117)
ALT SERPL W P-5'-P-CCNC: 11 U/L (ref 1–33)
AMPHET+METHAMPHET UR QL: NEGATIVE
ANION GAP SERPL CALCULATED.3IONS-SCNC: 11.2 MMOL/L (ref 5–15)
AST SERPL-CCNC: 15 U/L (ref 1–32)
BACTERIA UR QL AUTO: ABNORMAL /HPF
BARBITURATES UR QL SCN: NEGATIVE
BASOPHILS # BLD AUTO: 0.06 10*3/MM3 (ref 0–0.2)
BASOPHILS NFR BLD AUTO: 0.6 % (ref 0–1.5)
BENZODIAZ UR QL SCN: NEGATIVE
BILIRUB SERPL-MCNC: 0.2 MG/DL (ref 0.2–1.2)
BILIRUB UR QL STRIP: NEGATIVE
BUN BLD-MCNC: 15 MG/DL (ref 6–20)
BUN/CREAT SERPL: 19.7 (ref 7–25)
BUPRENORPHINE SERPL-MCNC: NEGATIVE NG/ML
CALCIUM SPEC-SCNC: 8.8 MG/DL (ref 8.6–10.5)
CANNABINOIDS SERPL QL: NEGATIVE
CHLORIDE SERPL-SCNC: 107 MMOL/L (ref 98–107)
CLARITY UR: CLEAR
CO2 SERPL-SCNC: 23.8 MMOL/L (ref 22–29)
COCAINE UR QL: NEGATIVE
COLOR UR: YELLOW
CREAT BLD-MCNC: 0.76 MG/DL (ref 0.57–1)
DEPRECATED RDW RBC AUTO: 49.8 FL (ref 37–54)
EOSINOPHIL # BLD AUTO: 0.15 10*3/MM3 (ref 0–0.4)
EOSINOPHIL NFR BLD AUTO: 1.6 % (ref 0.3–6.2)
ERYTHROCYTE [DISTWIDTH] IN BLOOD BY AUTOMATED COUNT: 14.2 % (ref 12.3–15.4)
GFR SERPL CREATININE-BSD FRML MDRD: 85 ML/MIN/1.73
GLOBULIN UR ELPH-MCNC: 2.9 GM/DL
GLUCOSE BLD-MCNC: 75 MG/DL (ref 65–99)
GLUCOSE UR STRIP-MCNC: NEGATIVE MG/DL
HCT VFR BLD AUTO: 39.3 % (ref 34–46.6)
HGB BLD-MCNC: 12.1 G/DL (ref 12–15.9)
HGB UR QL STRIP.AUTO: NEGATIVE
HYALINE CASTS UR QL AUTO: ABNORMAL /LPF
IMM GRANULOCYTES # BLD AUTO: 0.02 10*3/MM3 (ref 0–0.05)
IMM GRANULOCYTES NFR BLD AUTO: 0.2 % (ref 0–0.5)
KETONES UR QL STRIP: ABNORMAL
LEUKOCYTE ESTERASE UR QL STRIP.AUTO: ABNORMAL
LYMPHOCYTES # BLD AUTO: 2.3 10*3/MM3 (ref 0.7–3.1)
LYMPHOCYTES NFR BLD AUTO: 24.1 % (ref 19.6–45.3)
MCH RBC QN AUTO: 29.4 PG (ref 26.6–33)
MCHC RBC AUTO-ENTMCNC: 30.8 G/DL (ref 31.5–35.7)
MCV RBC AUTO: 95.6 FL (ref 79–97)
METHADONE UR QL SCN: NEGATIVE
MONOCYTES # BLD AUTO: 0.64 10*3/MM3 (ref 0.1–0.9)
MONOCYTES NFR BLD AUTO: 6.7 % (ref 5–12)
NEUTROPHILS # BLD AUTO: 6.36 10*3/MM3 (ref 1.7–7)
NEUTROPHILS NFR BLD AUTO: 66.8 % (ref 42.7–76)
NITRITE UR QL STRIP: NEGATIVE
NRBC BLD AUTO-RTO: 0 /100 WBC (ref 0–0.2)
OPIATES UR QL: NEGATIVE
OXYCODONE UR QL SCN: NEGATIVE
PCP UR QL SCN: NEGATIVE
PH UR STRIP.AUTO: 6 [PH] (ref 5–8)
PLATELET # BLD AUTO: 271 10*3/MM3 (ref 140–450)
PMV BLD AUTO: 9.6 FL (ref 6–12)
POTASSIUM BLD-SCNC: 4.1 MMOL/L (ref 3.5–5.2)
PROT SERPL-MCNC: 6.7 G/DL (ref 6–8.5)
PROT UR QL STRIP: NEGATIVE
RBC # BLD AUTO: 4.11 10*6/MM3 (ref 3.77–5.28)
RBC # UR: ABNORMAL /HPF
REF LAB TEST METHOD: ABNORMAL
SODIUM BLD-SCNC: 142 MMOL/L (ref 136–145)
SP GR UR STRIP: 1.02 (ref 1–1.03)
SQUAMOUS #/AREA URNS HPF: ABNORMAL /HPF
UROBILINOGEN UR QL STRIP: ABNORMAL
WBC NRBC COR # BLD: 9.53 10*3/MM3 (ref 3.4–10.8)
WBC UR QL AUTO: ABNORMAL /HPF

## 2020-05-06 PROCEDURE — 80307 DRUG TEST PRSMV CHEM ANLYZR: CPT | Performed by: PHYSICIAN ASSISTANT

## 2020-05-06 PROCEDURE — 25010000002 KETOROLAC TROMETHAMINE PER 15 MG: Performed by: PHYSICIAN ASSISTANT

## 2020-05-06 PROCEDURE — 96374 THER/PROPH/DIAG INJ IV PUSH: CPT

## 2020-05-06 PROCEDURE — 74176 CT ABD & PELVIS W/O CONTRAST: CPT | Performed by: RADIOLOGY

## 2020-05-06 PROCEDURE — 87086 URINE CULTURE/COLONY COUNT: CPT | Performed by: PHYSICIAN ASSISTANT

## 2020-05-06 PROCEDURE — 25010000002 HYDROMORPHONE 1 MG/ML SOLUTION: Performed by: EMERGENCY MEDICINE

## 2020-05-06 PROCEDURE — 81001 URINALYSIS AUTO W/SCOPE: CPT | Performed by: PHYSICIAN ASSISTANT

## 2020-05-06 PROCEDURE — 96375 TX/PRO/DX INJ NEW DRUG ADDON: CPT

## 2020-05-06 PROCEDURE — 74176 CT ABD & PELVIS W/O CONTRAST: CPT

## 2020-05-06 PROCEDURE — 80053 COMPREHEN METABOLIC PANEL: CPT | Performed by: PHYSICIAN ASSISTANT

## 2020-05-06 PROCEDURE — 99284 EMERGENCY DEPT VISIT MOD MDM: CPT

## 2020-05-06 PROCEDURE — 85025 COMPLETE CBC W/AUTO DIFF WBC: CPT | Performed by: PHYSICIAN ASSISTANT

## 2020-05-06 RX ORDER — KETOROLAC TROMETHAMINE 30 MG/ML
10 INJECTION, SOLUTION INTRAMUSCULAR; INTRAVENOUS ONCE
Status: COMPLETED | OUTPATIENT
Start: 2020-05-06 | End: 2020-05-06

## 2020-05-06 RX ORDER — NITROFURANTOIN 25; 75 MG/1; MG/1
100 CAPSULE ORAL 2 TIMES DAILY
Qty: 14 CAPSULE | Refills: 0 | Status: SHIPPED | OUTPATIENT
Start: 2020-05-06 | End: 2020-05-13

## 2020-05-06 RX ORDER — KETOROLAC TROMETHAMINE 10 MG/1
10 TABLET, FILM COATED ORAL EVERY 6 HOURS PRN
Qty: 15 TABLET | Refills: 0 | OUTPATIENT
Start: 2020-05-06 | End: 2020-09-04

## 2020-05-06 RX ORDER — PHENAZOPYRIDINE HYDROCHLORIDE 200 MG/1
200 TABLET, FILM COATED ORAL 3 TIMES DAILY PRN
Qty: 6 TABLET | Refills: 0 | Status: SHIPPED | OUTPATIENT
Start: 2020-05-06 | End: 2020-05-08

## 2020-05-06 RX ORDER — SODIUM CHLORIDE 0.9 % (FLUSH) 0.9 %
10 SYRINGE (ML) INJECTION AS NEEDED
Status: DISCONTINUED | OUTPATIENT
Start: 2020-05-06 | End: 2020-05-06 | Stop reason: HOSPADM

## 2020-05-06 RX ADMIN — SODIUM CHLORIDE 1000 ML: 9 INJECTION, SOLUTION INTRAVENOUS at 11:05

## 2020-05-06 RX ADMIN — KETOROLAC TROMETHAMINE 10 MG: 30 INJECTION, SOLUTION INTRAMUSCULAR at 12:05

## 2020-05-06 RX ADMIN — HYDROMORPHONE HYDROCHLORIDE 1 MG: 1 INJECTION, SOLUTION INTRAMUSCULAR; INTRAVENOUS; SUBCUTANEOUS at 11:05

## 2020-05-06 NOTE — ED PROVIDER NOTES
Subjective   This is a 39-year-old female who comes in with chief complaint right flank pain that radiates to her right lower abdomen for the past week.  Patient describes the pain as sharp, stabbing pain.  Patient has had associated nausea.  Patient reports history of kidney stones and states this feels similar to her previous symptoms.  Patient also complains of dysuria with burning, increased urinary frequency.      History provided by:  Patient   used: No    Flank Pain   Pain location:  R flank  Pain quality: sharp and stabbing    Pain radiates to:  RLQ  Pain severity:  Moderate  Onset quality:  Gradual  Duration:  1 week  Timing:  Intermittent  Progression:  Worsening  Chronicity:  New  Context: not alcohol use, not awakening from sleep, not diet changes, not eating, not medication withdrawal, not previous surgeries, not recent sexual activity, not sick contacts and not trauma    Relieved by:  Nothing  Worsened by:  Nothing  Ineffective treatments:  None tried  Associated symptoms: chills, dysuria and nausea    Associated symptoms: no anorexia, no chest pain, no cough and no hematuria    Nausea:     Severity:  Moderate    Onset quality:  Gradual    Duration:  1 week    Timing:  Intermittent    Progression:  Worsening  Risk factors: no alcohol abuse, no aspirin use, not elderly, no NSAID use, not pregnant and no recent hospitalization        Review of Systems   Constitutional: Positive for chills.   HENT: Negative.  Negative for congestion, dental problem, drooling, facial swelling, hearing loss and mouth sores.    Eyes: Negative.  Negative for photophobia, pain, discharge and itching.   Respiratory: Negative for apnea, cough, choking and chest tightness.    Cardiovascular: Negative.  Negative for chest pain and leg swelling.   Gastrointestinal: Positive for abdominal distention, abdominal pain and nausea. Negative for anorexia.   Genitourinary: Positive for dysuria and flank pain. Negative  for genital sores, hematuria, menstrual problem and pelvic pain.   Musculoskeletal: Negative for arthralgias, back pain, gait problem and joint swelling.   Skin: Negative.  Negative for pallor, rash and wound.   Neurological: Negative.  Negative for dizziness, facial asymmetry and light-headedness.   Hematological: Negative.  Negative for adenopathy. Does not bruise/bleed easily.   Psychiatric/Behavioral: Negative.    All other systems reviewed and are negative.      Past Medical History:   Diagnosis Date   • Anxiety    • Chronic laryngitis     HISTORY OF   • Depression    • Endometriosis    • Fatigue    • GERD (gastroesophageal reflux disease)     full history in HPI.  SEVERE, on H2 blocker only due ot insurance not covering PPI.  Loses employment opportunities due to hoarseness from reflux.    • Hyperlipidemia    • Hypertension    • Kidney stones    • Migraine    • Prediabetes     HISTORY OF   • Sleep apnea     does not use CPAP due to intolerance   • Spinal headache        No Known Allergies    Past Surgical History:   Procedure Laterality Date   • ABDOMINAL SURGERY     • APPENDECTOMY N/A 2018    Procedure: APPENDECTOMY LAPAROSCOPIC;  Surgeon: Augustina Ayala MD;  Location: [facility];  Service: General   • BRAVO PROCEDURE N/A 2017    Procedure: ESOPHAGOGASTRODUODENOSCOPY AND NAILS;  Surgeon: Morris Solares MD;  Location: [facility];  Service:    • BRAVO PROCEDURE N/A 6/10/2019    Procedure: ESOPHAGOGASTRODUODENOSCOPY AND BRAVO; GE Junction at 36cm;  Surgeon: Morris Solares MD;  Location: [facility];  Service: Gastroenterology   • CARDIOVASCULAR STRESS TEST  2017   •  SECTION PRIMARY      done under general anesthesia b/c couldn't get epidural   •  SECTION WITH TUBAL     • CYSTOSCOPY N/A 2018    Procedure: CYSTOSCOPY;  Surgeon: Ousmane Alvarez MD;  Location: [facility];  Service: Urology   • DIAGNOSTIC LAPAROSCOPY      looking for endometriosis   • DILATATION AND  CURETTAGE N/A 10/4/2016    Procedure: DILATATION AND CURETTAGE WITH FROZEN SECTION;  Surgeon: Rajendra Rico MD;  Location: [facility] OR;  for abnormal uterine bleeding   • ECHO - CONVERTED  09/08/2017   • ENDOMETRIAL FULGURATION     • ENDOSCOPY  2017    in Dr. Beck Gongora   • EXTRACORPOREAL SHOCK WAVE LITHOTRIPSY (ESWL) Left 11/2/2018    Procedure: EXTRACORPOREAL SHOCKWAVE LITHOTRIPSY;  Surgeon: Ousmane Alvarez MD;  Location: [facility] OR;  Service: Urology   • HIATAL HERNIA REPAIR N/A 1/29/2018    Procedure: HIATAL HERNIA REPAIR LAPAROSCOPIC;  Surgeon: Morris Solares MD;  Location: [facility] OR;  Service:    • NISSEN FUNDOPLICATION N/A 1/29/2018    Procedure: NISSEN FUNDOPLICATION LAPAROSCOPIC ;  Surgeon: Morris Solares MD;  Location: [facility] OR;  Service:    • TOTAL LAPAROSCOPIC HYSTERECTOMY N/A 10/4/2016    Procedure: TOTAL LAPAROSCOPIC HYSTERECTOMY BILATERAL SALPINGO OOPHERECTOMY WITH DAVINCI SI ROBOT;  Surgeon: Rajendra Rico MD;  Location: [facility] OR;  endometriosis/fibroids   • TUBAL ABDOMINAL LIGATION  2006       Family History   Problem Relation Age of Onset   • No Known Problems Mother    • No Known Problems Father    • Obesity Sister    • Hypertension Sister        Social History     Socioeconomic History   • Marital status:      Spouse name: Heath Garsia   • Number of children: 2   • Years of education: high School [employer]    • Highest education level: Not on file   Occupational History   • Occupation: Unemployed    Tobacco Use   • Smoking status: Current Some Day Smoker     Packs/day: 0.50     Years: 12.00     Pack years: 6.00     Types: Cigarettes   • Smokeless tobacco: Never Used   Substance and Sexual Activity   • Alcohol use: Yes     Comment: occasional   • Drug use: No   • Sexual activity: Defer     Partners: Male     Birth control/protection: Surgical     Comment:     Social History Narrative    Lives with  and children.  Unemployed due to not being able to find work  due to hoarse voice.  Denied disability due to hoarseness not that severe.             Objective   Physical Exam   Constitutional: She is oriented to person, place, and time. She appears well-developed and well-nourished. No distress.   HENT:   Head: Normocephalic and atraumatic.   Right Ear: External ear normal.   Left Ear: External ear normal.   Nose: Nose normal.   Mouth/Throat: Oropharynx is clear and moist. No oropharyngeal exudate.   Eyes: Pupils are equal, round, and reactive to light. Conjunctivae and EOM are normal. Right eye exhibits no discharge. Left eye exhibits no discharge. No scleral icterus.   Neck: Normal range of motion. Neck supple. No JVD present. No tracheal deviation present. No thyromegaly present.   Cardiovascular: Normal rate, regular rhythm, normal heart sounds and intact distal pulses. Exam reveals no friction rub.   No murmur heard.  Pulmonary/Chest: Effort normal and breath sounds normal. No stridor. No respiratory distress. She has no wheezes. She has no rales. She exhibits no tenderness.   Abdominal: Soft. Bowel sounds are normal. She exhibits no distension and no mass. There is no rebound and no guarding. No hernia.   Musculoskeletal: Normal range of motion. She exhibits no edema, tenderness or deformity.   Right cva tenderness.    Lymphadenopathy:     She has no cervical adenopathy.   Neurological: She is alert and oriented to person, place, and time. She displays normal reflexes. No cranial nerve deficit or sensory deficit. She exhibits normal muscle tone. Coordination normal.   Skin: Skin is warm and dry. Capillary refill takes less than 2 seconds. No rash noted. She is not diaphoretic. No erythema. No pallor.   Psychiatric: She has a normal mood and affect. Her behavior is normal. Judgment and thought content normal.   Nursing note and vitals reviewed.      Procedures           ED Course  ED Course as of May 07 0821   Wed May 06, 2020   1054 Impression:  1. Unremarkable exam.  No  source for the patient symptoms.  2. Other incidental/non-acute findings as above.    []   1136 Discussed care with patient. Patient will be started on oral abx.Advised to return if condition worsens.     []      ED Course User Index  [] Antonio Cheema PA-C                                           [facility]    Final diagnoses:   Acute lower UTI   Right flank pain            Antonio Cheema PA-C  05/07/20 0857

## 2020-05-06 NOTE — ED NOTES
Discussed patient's discharge instructions and follow up care. Fluid bolus nearing completion. Patient is waiting on  to pick her up.     Laverne Birmingham RN  05/06/20 [id number]

## 2020-05-07 LAB — BACTERIA SPEC AEROBE CULT: ABNORMAL

## 2020-07-02 ENCOUNTER — OFFICE VISIT (OUTPATIENT)
Dept: PSYCHIATRY | Facility: CLINIC | Age: 40
End: 2020-07-02

## 2020-07-02 VITALS
DIASTOLIC BLOOD PRESSURE: 77 MMHG | TEMPERATURE: 97.6 F | SYSTOLIC BLOOD PRESSURE: 128 MMHG | WEIGHT: 220.8 LBS | BODY MASS INDEX: 41.69 KG/M2 | HEART RATE: 89 BPM | HEIGHT: 61 IN

## 2020-07-02 DIAGNOSIS — F90.2 ADHD (ATTENTION DEFICIT HYPERACTIVITY DISORDER), COMBINED TYPE: ICD-10-CM

## 2020-07-02 DIAGNOSIS — F33.1 MODERATE EPISODE OF RECURRENT MAJOR DEPRESSIVE DISORDER (HCC): ICD-10-CM

## 2020-07-02 DIAGNOSIS — F42.2 MIXED OBSESSIONAL THOUGHTS AND ACTS: ICD-10-CM

## 2020-07-02 DIAGNOSIS — F41.9 ANXIETY DISORDER, UNSPECIFIED TYPE: Primary | ICD-10-CM

## 2020-07-02 DIAGNOSIS — G47.00 INSOMNIA, UNSPECIFIED TYPE: ICD-10-CM

## 2020-07-02 PROCEDURE — 99214 OFFICE O/P EST MOD 30 MIN: CPT | Performed by: NURSE PRACTITIONER

## 2020-07-02 RX ORDER — CLONIDINE HYDROCHLORIDE 0.1 MG/1
0.1 TABLET ORAL 2 TIMES DAILY
Qty: 60 TABLET | Refills: 1 | Status: SHIPPED | OUTPATIENT
Start: 2020-07-02 | End: 2020-08-13 | Stop reason: SDUPTHER

## 2020-07-02 RX ORDER — FLUOXETINE HYDROCHLORIDE 40 MG/1
40 CAPSULE ORAL DAILY
Qty: 30 CAPSULE | Refills: 1 | Status: SHIPPED | OUTPATIENT
Start: 2020-07-02 | End: 2020-08-13 | Stop reason: SDUPTHER

## 2020-07-02 RX ORDER — FLUOXETINE HYDROCHLORIDE 20 MG/1
20 CAPSULE ORAL DAILY
Qty: 30 CAPSULE | Refills: 1 | Status: SHIPPED | OUTPATIENT
Start: 2020-07-02 | End: 2020-08-13 | Stop reason: SDUPTHER
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
## 2020-08-13 ENCOUNTER — OFFICE VISIT (OUTPATIENT)
Dept: PSYCHIATRY | Facility: CLINIC | Age: 40
End: 2020-08-13

## 2020-08-13 VITALS
DIASTOLIC BLOOD PRESSURE: 86 MMHG | SYSTOLIC BLOOD PRESSURE: 136 MMHG | BODY MASS INDEX: 42.25 KG/M2 | HEIGHT: 61 IN | TEMPERATURE: 97.4 F | WEIGHT: 223.8 LBS | HEART RATE: 81 BPM

## 2020-08-13 DIAGNOSIS — F33.1 MODERATE EPISODE OF RECURRENT MAJOR DEPRESSIVE DISORDER (HCC): ICD-10-CM

## 2020-08-13 DIAGNOSIS — F42.2 MIXED OBSESSIONAL THOUGHTS AND ACTS: ICD-10-CM

## 2020-08-13 DIAGNOSIS — F90.2 ADHD (ATTENTION DEFICIT HYPERACTIVITY DISORDER), COMBINED TYPE: ICD-10-CM

## 2020-08-13 DIAGNOSIS — G47.00 INSOMNIA, UNSPECIFIED TYPE: ICD-10-CM

## 2020-08-13 DIAGNOSIS — F41.9 ANXIETY DISORDER, UNSPECIFIED TYPE: Primary | ICD-10-CM

## 2020-08-13 PROCEDURE — 99214 OFFICE O/P EST MOD 30 MIN: CPT | Performed by: NURSE PRACTITIONER

## 2020-08-13 RX ORDER — FLUOXETINE HYDROCHLORIDE 40 MG/1
40 CAPSULE ORAL DAILY
Qty: 30 CAPSULE | Refills: 1 | OUTPATIENT
Start: 2020-08-13 | End: 2020-09-04

## 2020-08-13 RX ORDER — CLONIDINE HYDROCHLORIDE 0.1 MG/1
0.1 TABLET ORAL 2 TIMES DAILY
Qty: 60 TABLET | Refills: 1 | Status: SHIPPED | OUTPATIENT
Start: 2020-08-13 | End: 2020-10-08

## 2020-08-13 RX ORDER — FLUOXETINE HYDROCHLORIDE 20 MG/1
20 CAPSULE ORAL DAILY
Qty: 30 CAPSULE | Refills: 1 | Status: SHIPPED | OUTPATIENT
Start: 2020-08-13 | End: 2020-10-08
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
## 2020-09-04 ENCOUNTER — HOSPITAL ENCOUNTER (EMERGENCY)
Facility: HOSPITAL | Age: 40
Discharge: HOME | End: 2020-09-04
Admitting: EMERGENCY MEDICINE

## 2020-09-04 VITALS
HEART RATE: 90 BPM | SYSTOLIC BLOOD PRESSURE: 144 MMHG | DIASTOLIC BLOOD PRESSURE: 97 MMHG | RESPIRATION RATE: 17 BRPM | HEIGHT: 61 IN | OXYGEN SATURATION: 98 % | TEMPERATURE: 98.3 F | BODY MASS INDEX: 41.54 KG/M2 | WEIGHT: 220 LBS

## 2020-09-04 DIAGNOSIS — N39.0 URINARY TRACT INFECTION WITH HEMATURIA, SITE UNSPECIFIED: Primary | ICD-10-CM

## 2020-09-04 DIAGNOSIS — R31.9 URINARY TRACT INFECTION WITH HEMATURIA, SITE UNSPECIFIED: Primary | ICD-10-CM

## 2020-09-04 LAB
BACTERIA UR QL AUTO: ABNORMAL /HPF
BILIRUB UR QL STRIP: NEGATIVE
CLARITY UR: CLEAR
COLOR UR: YELLOW
GLUCOSE UR STRIP-MCNC: NEGATIVE MG/DL
HGB UR QL STRIP.AUTO: ABNORMAL
HYALINE CASTS UR QL AUTO: ABNORMAL /LPF
KETONES UR QL STRIP: NEGATIVE
LEUKOCYTE ESTERASE UR QL STRIP.AUTO: ABNORMAL
NITRITE UR QL STRIP: NEGATIVE
PH UR STRIP.AUTO: <=5 [PH] (ref 5–8)
PROT UR QL STRIP: NEGATIVE
RBC # UR: ABNORMAL /HPF
REF LAB TEST METHOD: ABNORMAL
SP GR UR STRIP: 1.02 (ref 1–1.03)
SQUAMOUS #/AREA URNS HPF: ABNORMAL /HPF
UROBILINOGEN UR QL STRIP: ABNORMAL
WBC UR QL AUTO: ABNORMAL /HPF

## 2020-09-04 PROCEDURE — 81001 URINALYSIS AUTO W/SCOPE: CPT | Performed by: PHYSICIAN ASSISTANT

## 2020-09-04 PROCEDURE — 99283 EMERGENCY DEPT VISIT LOW MDM: CPT

## 2020-09-04 PROCEDURE — 87086 URINE CULTURE/COLONY COUNT: CPT | Performed by: PHYSICIAN ASSISTANT

## 2020-09-04 RX ORDER — PHENAZOPYRIDINE HYDROCHLORIDE 200 MG/1
200 TABLET, FILM COATED ORAL ONCE
Status: COMPLETED | OUTPATIENT
Start: 2020-09-04 | End: 2020-09-04

## 2020-09-04 RX ORDER — KETOROLAC TROMETHAMINE 10 MG/1
10 TABLET, FILM COATED ORAL ONCE
Status: COMPLETED | OUTPATIENT
Start: 2020-09-04 | End: 2020-09-04

## 2020-09-04 RX ORDER — SULFAMETHOXAZOLE AND TRIMETHOPRIM 400; 80 MG/1; MG/1
1 TABLET ORAL 2 TIMES DAILY
Qty: 14 TABLET | Refills: 0 | Status: SHIPPED | OUTPATIENT
Start: 2020-09-04 | End: 2020-09-11

## 2020-09-04 RX ORDER — SULFAMETHOXAZOLE AND TRIMETHOPRIM 800; 160 MG/1; MG/1
1 TABLET ORAL ONCE
Status: COMPLETED | OUTPATIENT
Start: 2020-09-04 | End: 2020-09-04

## 2020-09-04 RX ADMIN — PHENAZOPYRIDINE HYDROCHLORIDE 200 MG: 200 TABLET ORAL at 23:06

## 2020-09-04 RX ADMIN — SULFAMETHOXAZOLE AND TRIMETHOPRIM 160 MG: 800; 160 TABLET ORAL at 23:06

## 2020-09-04 RX ADMIN — KETOROLAC TROMETHAMINE 10 MG: 10 TABLET, FILM COATED ORAL at 23:06

## 2020-09-05 LAB — BACTERIA SPEC AEROBE CULT: NORMAL

## 2020-09-05 NOTE — ED PROVIDER NOTES
Subjective     History provided by:  Patient   used: No    Dysuria   Pain quality:  Aching  Pain severity:  Mild  Onset quality:  Sudden  Duration:  1 week  Timing:  Constant  Progression:  Worsening  Chronicity:  New  Recent urinary tract infections: no    Relieved by:  Nothing  Worsened by:  Nothing  Ineffective treatments:  None tried  Associated symptoms: no fever, no flank pain, no nausea and no vomiting        Review of Systems   Constitutional: Negative for chills and fever.   HENT: Negative for congestion, ear pain and sore throat.    Respiratory: Negative for cough, shortness of breath and wheezing.    Cardiovascular: Negative for chest pain.   Gastrointestinal: Negative for diarrhea, nausea and vomiting.   Genitourinary: Positive for dysuria and urgency. Negative for flank pain.   Skin: Negative for rash.   Neurological: Negative for headaches.   Psychiatric/Behavioral: The patient is not nervous/anxious.    All other systems reviewed and are negative.      Past Medical History:   Diagnosis Date   • Anxiety    • Chronic laryngitis     HISTORY OF   • Depression    • Endometriosis    • Fatigue    • GERD (gastroesophageal reflux disease)     full history in [facility].  SEVERE, on H2 blocker only due ot insurance not covering PPI.  Loses employment opportunities due to hoarseness from reflux.    • Hyperlipidemia    • Hypertension    • Kidney stones    • Migraine    • Prediabetes     HISTORY OF   • Sleep apnea     does not use CPAP due to intolerance   • Spinal headache        No Known Allergies    Past Surgical History:   Procedure Laterality Date   • ABDOMINAL SURGERY     • APPENDECTOMY N/A 12/14/2018    Procedure: APPENDECTOMY LAPAROSCOPIC;  Surgeon: Augustina Ayala MD;  Location: [facility];  Service: General   • BRAVO PROCEDURE N/A 5/8/2017    Procedure: ESOPHAGOGASTRODUODENOSCOPY AND NAILS;  Surgeon: Morris Solares MD;  Location: [facility];  Service:    • BRAVO PROCEDURE N/A 6/10/2019     Procedure: ESOPHAGOGASTRODUODENOSCOPY AND BRAVO; GE Junction at 36cm;  Surgeon: Morris Solares MD;  [facility];  Service: Gastroenterology   • CARDIOVASCULAR STRESS TEST  2017   •  SECTION PRIMARY      done under general anesthesia b/c couldn't get epidural   •  SECTION WITH TUBAL     • CYSTOSCOPY N/A 2018    Procedure: CYSTOSCOPY;  Surgeon: Ousmane Alvarez MD;  [facility];  Service: Urology   • DIAGNOSTIC LAPAROSCOPY      looking for endometriosis   • DILATATION AND CURETTAGE N/A 10/4/2016    Procedure: DILATATION AND CURETTAGE WITH FROZEN SECTION;  Surgeon: Rajendra Rico MD;  [facility]:  [facility];  for abnormal uterine bleeding   • ECHO - CONVERTED  2017   • ENDOMETRIAL FULGURATION     • ENDOSCOPY      in Dr. Beck Gongora   • EXTRACORPOREAL SHOCK WAVE LITHOTRIPSY (ESWL) Left 2018    Procedure: EXTRACORPOREAL SHOCKWAVE LITHOTRIPSY;  Surgeon: Ousmane Alvarez MD;  Location: [facility] OR;  Service: Urology   • HIATAL HERNIA REPAIR N/A 2018    Procedure: HIATAL HERNIA REPAIR LAPAROSCOPIC;  Surgeon: Morris Solares MD;  Location: [facility] OR;  Service:    • NISSEN FUNDOPLICATION N/A 2018    Procedure: NISSEN FUNDOPLICATION LAPAROSCOPIC ;  Surgeon: Morris Solares MD;  Location: [facility] OR;  Service:    • TOTAL LAPAROSCOPIC HYSTERECTOMY N/A 10/4/2016    Procedure: TOTAL LAPAROSCOPIC HYSTERECTOMY BILATERAL SALPINGO OOPHERECTOMY WITH DAVINCI SI ROBOT;  Surgeon: Rajendra Rico MD;  [facility];  endometriosis/fibroids   • TUBAL ABDOMINAL LIGATION         Family History   Problem Relation Age of Onset   • No Known Problems Mother    • No Known Problems Father    • Obesity Sister    • Hypertension Sister        Social History     Socioeconomic History   • Marital status:      Spouse name: Heath Garsia   • Number of children: 2   • Years of education: high School [employer]    • Highest education level: Not on file    Occupational History   • Occupation: Unemployed    Tobacco Use   • Smoking status: Current Some Day Smoker     Packs/day: 0.50     Years: 12.00     Pack years: 6.00     Types: Cigarettes   • Smokeless tobacco: Never Used   Substance and Sexual Activity   • Alcohol use: Yes     Comment: occasional   • Drug use: No   • Sexual activity: Defer     Partners: Male     Birth control/protection: Surgical     Comment:     Social History Narrative    Lives with  and children.  Unemployed due to not being able to find work due to hoarse voice.  Denied disability due to hoarseness not that severe.             Objective   Physical Exam   Constitutional: She is oriented to person, place, and time. She appears well-developed and well-nourished.   HENT:   Head: Normocephalic.   Mouth/Throat: Oropharynx is clear and moist.   Neck: Neck supple.   Cardiovascular: Normal rate and regular rhythm.   Pulmonary/Chest: Effort normal and breath sounds normal.   Abdominal: Soft. Bowel sounds are normal. There is tenderness.   Musculoskeletal: Normal range of motion.   Neurological: She is alert and oriented to person, place, and time.   Skin: Skin is warm and dry.   Psychiatric: She has a normal mood and affect. Her behavior is normal. Judgment and thought content normal.   Nursing note and vitals reviewed.      Procedures           ED Course                                           MDM    Final diagnoses:   Urinary tract infection with hematuria, site unspecified            Susana Cote PA  09/04/20 3352

## 2020-10-01 ENCOUNTER — APPOINTMENT (OUTPATIENT)
Dept: GENERAL RADIOLOGY | Facility: HOSPITAL | Age: 40
End: 2020-10-01

## 2020-10-01 ENCOUNTER — HOSPITAL ENCOUNTER (EMERGENCY)
Facility: HOSPITAL | Age: 40
Discharge: HOME | End: 2020-10-01
Attending: FAMILY MEDICINE | Admitting: FAMILY MEDICINE

## 2020-10-01 VITALS
RESPIRATION RATE: 20 BRPM | OXYGEN SATURATION: 98 % | SYSTOLIC BLOOD PRESSURE: 123 MMHG | HEART RATE: 97 BPM | WEIGHT: 226 LBS | HEIGHT: 61 IN | TEMPERATURE: 98.3 F | DIASTOLIC BLOOD PRESSURE: 95 MMHG | BODY MASS INDEX: 42.67 KG/M2

## 2020-10-01 DIAGNOSIS — S93.401A MODERATE RIGHT ANKLE SPRAIN, INITIAL ENCOUNTER: Primary | ICD-10-CM

## 2020-10-01 PROCEDURE — 73630 X-RAY EXAM OF FOOT: CPT | Performed by: RADIOLOGY

## 2020-10-01 PROCEDURE — 73610 X-RAY EXAM OF ANKLE: CPT | Performed by: RADIOLOGY

## 2020-10-01 PROCEDURE — 63710000001 ONDANSETRON ODT 4 MG TABLET DISPERSIBLE: Performed by: FAMILY MEDICINE

## 2020-10-01 PROCEDURE — 25010000002 KETOROLAC TROMETHAMINE PER 15 MG: Performed by: PHYSICIAN ASSISTANT

## 2020-10-01 PROCEDURE — 73630 X-RAY EXAM OF FOOT: CPT

## 2020-10-01 PROCEDURE — 73610 X-RAY EXAM OF ANKLE: CPT

## 2020-10-01 PROCEDURE — 96372 THER/PROPH/DIAG INJ SC/IM: CPT

## 2020-10-01 PROCEDURE — 99284 EMERGENCY DEPT VISIT MOD MDM: CPT

## 2020-10-01 RX ORDER — ONDANSETRON 4 MG/1
4 TABLET, ORALLY DISINTEGRATING ORAL ONCE
Status: COMPLETED | OUTPATIENT
Start: 2020-10-01 | End: 2020-10-01

## 2020-10-01 RX ORDER — ONDANSETRON 4 MG/1
4 TABLET, ORALLY DISINTEGRATING ORAL EVERY 6 HOURS PRN
Qty: 12 TABLET | Refills: 0 | Status: SHIPPED | OUTPATIENT
Start: 2020-10-01

## 2020-10-01 RX ORDER — KETOROLAC TROMETHAMINE 30 MG/ML
30 INJECTION, SOLUTION INTRAMUSCULAR; INTRAVENOUS ONCE
Status: COMPLETED | OUTPATIENT
Start: 2020-10-01 | End: 2020-10-01

## 2020-10-01 RX ORDER — HYDROCODONE BITARTRATE AND ACETAMINOPHEN 5; 325 MG/1; MG/1
1 TABLET ORAL EVERY 6 HOURS PRN
Qty: 12 TABLET | Refills: 0 | Status: SHIPPED | OUTPATIENT
Start: 2020-10-01 | End: 2021-07-05 | Stop reason: SDUPTHER

## 2020-10-01 RX ORDER — HYDROCODONE BITARTRATE AND ACETAMINOPHEN 5; 325 MG/1; MG/1
1 TABLET ORAL ONCE
Status: COMPLETED | OUTPATIENT
Start: 2020-10-01 | End: 2020-10-01

## 2020-10-01 RX ADMIN — HYDROCODONE BITARTRATE AND ACETAMINOPHEN 1 TABLET: 5; 325 TABLET ORAL at 14:02

## 2020-10-01 RX ADMIN — KETOROLAC TROMETHAMINE 30 MG: 30 INJECTION, SOLUTION INTRAMUSCULAR; INTRAVENOUS at 14:36

## 2020-10-01 RX ADMIN — ONDANSETRON 4 MG: 4 TABLET, ORALLY DISINTEGRATING ORAL at 14:02

## 2020-10-01 NOTE — ED PROVIDER NOTES
Subjective   Patient presents to the emergency department chief complaint right ankle and foot injury. Patient states that she was tripped injury right ankle and foot. States that family doctor has been  Treating her for strep. Has been taking clindamycin. States that she has had aching, throbbing pain. No other reported injuries at this time.       History provided by:  Patient   used: No    Ankle Pain  Location:  Ankle  Time since incident:  1 day  Injury: no    Ankle location:  R ankle  Pain details:     Quality:  Aching and throbbing    Severity:  Moderate    Onset quality:  Gradual    Duration:  1 day    Timing:  Intermittent    Progression:  Worsening  Chronicity:  New  Dislocation: no    Foreign body present:  No foreign bodies  Tetanus status:  Unknown  Prior injury to area:  No  Relieved by:  Nothing  Worsened by:  Nothing  Ineffective treatments:  None tried  Associated symptoms: decreased ROM, stiffness and swelling    Risk factors: no concern for non-accidental trauma, no frequent fractures, no known bone disorder and no obesity        Review of Systems   Eyes: Negative.  Negative for photophobia, redness and itching.   Respiratory: Negative.  Negative for cough, choking, shortness of breath and stridor.    Cardiovascular: Negative.  Negative for chest pain and palpitations.   Endocrine: Negative.  Negative for cold intolerance, polydipsia and polyphagia.   Genitourinary: Negative.  Negative for difficulty urinating, enuresis, flank pain and frequency.   Musculoskeletal: Positive for arthralgias, joint swelling, myalgias and stiffness.   Skin: Negative.  Negative for color change, pallor and rash.   Hematological: Negative.  Negative for adenopathy. Does not bruise/bleed easily.   Psychiatric/Behavioral: Negative.  Negative for agitation, behavioral problems, confusion, decreased concentration, dysphoric mood and hallucinations. The patient is not hyperactive.    All other systems  reviewed and are negative.      Past Medical History:   Diagnosis Date   • Anxiety    • Chronic laryngitis     HISTORY OF   • Depression    • Endometriosis    • Fatigue    • GERD (gastroesophageal reflux disease)     full history in [facility].  SEVERE, on H2 blocker only due ot insurance not covering PPI.  Loses employment opportunities due to hoarseness from reflux.    • Hyperlipidemia    • Hypertension    • Kidney stones    • Migraine    • Prediabetes     HISTORY OF   • Sleep apnea     does not use CPAP due to intolerance   • Spinal headache        No Known Allergies    Past Surgical History:   Procedure Laterality Date   • ABDOMINAL SURGERY     • APPENDECTOMY N/A 2018    Procedure: APPENDECTOMY LAPAROSCOPIC;  Surgeon: Augustina Ayala MD;  Location: [facility] OR;  Service: General   • BRAVO PROCEDURE N/A 2017    Procedure: ESOPHAGOGASTRODUODENOSCOPY AND NAILS;  Surgeon: Morris Solares MD;  Location: [facility] OR;  Service:    • BRAVO PROCEDURE N/A 6/10/2019    Procedure: ESOPHAGOGASTRODUODENOSCOPY AND BRAVO; GE Junction at 36cm;  Surgeon: Morris Solares MD;  Location: [facility];  Service: Gastroenterology   • CARDIOVASCULAR STRESS TEST  2017   •  SECTION PRIMARY      done under general anesthesia b/c couldn't get epidural   •  SECTION WITH TUBAL     • CYSTOSCOPY N/A 2018    Procedure: CYSTOSCOPY;  Surgeon: Ousmane Alvarez MD;  Location: [facility];  Service: Urology   • DIAGNOSTIC LAPAROSCOPY      looking for endometriosis   • DILATATION AND CURETTAGE N/A 10/4/2016    Procedure: DILATATION AND CURETTAGE WITH FROZEN SECTION;  Surgeon: Rajendra Rico MD;  Location: [facility];  for abnormal uterine bleeding   • ECHO - CONVERTED  2017   • ENDOMETRIAL FULGUR[facility]     [facility]   • EXTRACORPOREAL SHOCK WAVE LITHOTRIPSY (ESWL) Left 2018    Procedure: EXTRACORPOREAL SHOCKWAVE LITHOTRIPSY;  Surgeon: Ousmane Alvarez MD;  [facility];  Service: Urology   • HIATAL HERNIA REPAIR N/A 1/29/2018    Procedure: HIATAL HERNIA REPAIR LAPAROSCOPIC;  Surgeon: Morris Solares MD;  [facility];  Service:    • NISSEN FUNDOPLICATION N/A 1/29/2018    Procedure: NISSEN FUNDOPLICATION LAPAROSCOPIC ;  Surgeon: Morris Solares MD;  [facility];  Service:    • TOTAL LAPAROSCOPIC HYSTERECTOMY N/A 10/4/2016    Procedure: TOTAL LAPAROSCOPIC HYSTERECTOMY BILATERAL SALPINGO OOPHERECTOMY WITH DAVINCI SI ROBOT;  Surgeon: Rajendra Rico MD;  [facility];  endometriosis/fibroids   • TUBAL ABDOMINAL LIGATION  2006       Family History   Problem Relation Age of Onset   • No Known Problems Mother    • No Known Problems Father    • Obesity Sister    • Hypertension Sister        Social History     Socioeconomic History   • Marital status:      Spouse name: Heath Garsia   • Number of children: 2   • Years of education: high School [employer]    • Highest education level: Not on file   Occupational History   • Occupation: Unemployed    Tobacco Use   • Smoking status: Current Some Day Smoker     Packs/day: 0.50     Years: 12.00     Pack years: 6.00     Types: Cigarettes   • Smokeless tobacco: Never Used   Substance and Sexual Activity   • Alcohol use: Yes     Comment: occasional   • Drug use: No   • Sexual activity: Defer     Partners: Male     Birth control/protection: Surgical     Comment:     Social History Narrative    Lives with  and children.  Unemployed due to not being able to find work due to hoarse voice.  Denied disability due to hoarseness not that severe.             Objective   Physical Exam  Vitals signs and nursing note reviewed.   Constitutional:       General: She is not in acute distress.     Appearance: Normal appearance. She is normal weight. She is not ill-appearing, toxic-appearing or diaphoretic.   HENT:      Head: Normocephalic and atraumatic.      Right Ear: Tympanic membrane and ear canal normal. There is  no impacted cerumen.      Left Ear: Tympanic membrane and ear canal normal. There is no impacted cerumen.      Nose: No congestion or rhinorrhea.      Mouth/Throat:      Mouth: Mucous membranes are dry.      Pharynx: Oropharynx is clear. No posterior oropharyngeal erythema.   Eyes:      General: No scleral icterus.        Right eye: No discharge.         Left eye: No discharge.      Extraocular Movements: Extraocular movements intact.      Conjunctiva/sclera: Conjunctivae normal.      Pupils: Pupils are equal, round, and reactive to light.   Neck:      Musculoskeletal: Normal range of motion and neck supple. No neck rigidity or muscular tenderness.      Vascular: No carotid bruit.   Cardiovascular:      Rate and Rhythm: Normal rate.      Pulses: Normal pulses.      Heart sounds: Normal heart sounds. No murmur. No friction rub. No gallop.    Pulmonary:      Effort: Pulmonary effort is normal. No respiratory distress.      Breath sounds: Normal breath sounds. No stridor. No wheezing, rhonchi or rales.   Chest:      Chest wall: No tenderness.   Abdominal:      General: Abdomen is flat. Bowel sounds are normal. There is no distension.      Palpations: Abdomen is soft. There is no mass.      Tenderness: There is no abdominal tenderness. There is no right CVA tenderness, left CVA tenderness, guarding or rebound.      Hernia: No hernia is present.   Musculoskeletal:         General: Swelling and tenderness present.      Right ankle: She exhibits decreased range of motion. Tenderness. Lateral malleolus tenderness found.   Lymphadenopathy:      Cervical: No cervical adenopathy.   Skin:     General: Skin is warm and dry.      Capillary Refill: Capillary refill takes less than 2 seconds.      Coloration: Skin is not jaundiced or pale.      Findings: No bruising, erythema, lesion or rash.   Neurological:      General: No focal deficit present.      Mental Status: She is alert and oriented to person, place, and time. Mental  status is at baseline.      Cranial Nerves: No cranial nerve deficit.      Sensory: No sensory deficit.      Motor: No weakness.      Coordination: Coordination normal.      Gait: Gait normal.      Deep Tendon Reflexes: Reflexes normal.   Psychiatric:         Mood and Affect: Mood normal.         Behavior: Behavior normal.         Thought Content: Thought content normal.         Judgment: Judgment normal.         Splint - Cast - Strapping    Date/Time: 10/1/2020 3:30 PM  Performed by: Antonio Cheema PA-C  Authorized by: Claudette Cope DO     Consent:     Consent obtained:  Verbal    Consent given by:  Patient    Risks discussed:  Discoloration, pain and swelling    Alternatives discussed:  No treatment  Pre-procedure details:     Sensation:  Normal    Skin color:  Pink   Procedure details:     Laterality:  Right    Location:  Foot    Foot:  R foot    Strapping: no      Cast type:  Short leg walking    Splint type:  Short leg    Supplies:  Prefabricated splint  Post-procedure details:     Pain:  Improved    Sensation:  Normal    Skin color:  Pink     Patient tolerance of procedure:  Tolerated well, no immediate complications  Comments:      N/V intact                ED Course  ED Course as of Oct 01 1533   Thu Oct 01, 2020   1529 IMPRESSION:  No acute or destructive bony abnormality.     COMMUNICATION: Per this written report.       [BH]   1529 IMPRESSION:  No acute or destructive bony abnormality.     COMMUNICATION: Per this written report.    []      ED Course User Index  [] Antonio Cheema PA-C                                           [facility]    Final diagnoses:   Moderate right ankle sprain, initial encounter            Antonio Cheema PA-C  10/01/20 1533

## 2020-10-08 ENCOUNTER — OFFICE VISIT (OUTPATIENT)
Dept: PSYCHIATRY | Facility: CLINIC | Age: 40
End: 2020-10-08

## 2020-10-08 DIAGNOSIS — F41.9 ANXIETY DISORDER, UNSPECIFIED TYPE: Primary | ICD-10-CM

## 2020-10-08 DIAGNOSIS — F33.1 MODERATE EPISODE OF RECURRENT MAJOR DEPRESSIVE DISORDER (HCC): ICD-10-CM

## 2020-10-08 PROCEDURE — 99442 PR PHYS/QHP TELEPHONE EVALUATION 11-20 MIN: CPT | Performed by: NURSE PRACTITIONER

## 2020-10-08 NOTE — PROGRESS NOTES
This is a telephone visit due to the covid pandemic.  Provider is quarantined.  Pt is the historian and gives permission for the phone visit to occur.      CC:  Recheck depression and anxiety    Pt has fractured her ankle falling down the stairs.  She is off work for 1 month due to this.  She states she has not been taking her medication because she has been sick with strep and mono and the recent accident.  Says right now she does not feel she needs anything.  Denies any SI.  Says depression and anxiety come and go and is more situational.  Sleeping well.      Treatment plan:  For now she does not wish to take meds.  Does not wish to see a therapist.  Will follow up in 8 weeks.  Sooner if she feels she needs back on meds.      This visit has been rescheduled as a phone visit to comply with patient safety concerns in accordance with CDC recommendations. Total time of discussion was 15 minutes.

## 2021-01-03 ENCOUNTER — HOSPITAL ENCOUNTER (EMERGENCY)
Facility: HOSPITAL | Age: 41
Discharge: HOME | End: 2021-01-03
Attending: STUDENT IN AN ORGANIZED HEALTH CARE EDUCATION/TRAINING PROGRAM | Admitting: STUDENT IN AN ORGANIZED HEALTH CARE EDUCATION/TRAINING PROGRAM

## 2021-01-03 ENCOUNTER — APPOINTMENT (OUTPATIENT)
Dept: GENERAL RADIOLOGY | Facility: HOSPITAL | Age: 41
End: 2021-01-03

## 2021-01-03 VITALS
HEIGHT: 61 IN | TEMPERATURE: 98 F | DIASTOLIC BLOOD PRESSURE: 100 MMHG | SYSTOLIC BLOOD PRESSURE: 146 MMHG | BODY MASS INDEX: 42.86 KG/M2 | HEART RATE: 88 BPM | RESPIRATION RATE: 18 BRPM | WEIGHT: 227 LBS | OXYGEN SATURATION: 100 %

## 2021-01-03 DIAGNOSIS — J06.9 VIRAL URI: Primary | ICD-10-CM

## 2021-01-03 LAB
B PARAPERT DNA SPEC QL NAA+PROBE: NOT DETECTED
B PERT DNA SPEC QL NAA+PROBE: NOT DETECTED
C PNEUM DNA NPH QL NAA+NON-PROBE: NOT DETECTED
FLUAV SUBTYP SPEC NAA+PROBE: NOT DETECTED
FLUBV RNA ISLT QL NAA+PROBE: NOT DETECTED
HADV DNA SPEC NAA+PROBE: NOT DETECTED
HCOV 229E RNA SPEC QL NAA+PROBE: NOT DETECTED
HCOV HKU1 RNA SPEC QL NAA+PROBE: NOT DETECTED
HCOV NL63 RNA SPEC QL NAA+PROBE: NOT DETECTED
HCOV OC43 RNA SPEC QL NAA+PROBE: NOT DETECTED
HMPV RNA NPH QL NAA+NON-PROBE: NOT DETECTED
HPIV1 RNA SPEC QL NAA+PROBE: NOT DETECTED
HPIV2 RNA SPEC QL NAA+PROBE: NOT DETECTED
HPIV3 RNA NPH QL NAA+PROBE: NOT DETECTED
HPIV4 P GENE NPH QL NAA+PROBE: NOT DETECTED
M PNEUMO IGG SER IA-ACNC: NOT DETECTED
RHINOVIRUS RNA SPEC NAA+PROBE: NOT DETECTED
RSV RNA NPH QL NAA+NON-PROBE: NOT DETECTED
SARS-COV-2 RNA NPH QL NAA+NON-PROBE: NOT DETECTED

## 2021-01-03 PROCEDURE — 71045 X-RAY EXAM CHEST 1 VIEW: CPT

## 2021-01-03 PROCEDURE — 99283 EMERGENCY DEPT VISIT LOW MDM: CPT

## 2021-01-03 PROCEDURE — 0202U NFCT DS 22 TRGT SARS-COV-2: CPT | Performed by: STUDENT IN AN ORGANIZED HEALTH CARE EDUCATION/TRAINING PROGRAM

## 2021-01-03 RX ORDER — PANTOPRAZOLE SODIUM 40 MG/1
40 TABLET, DELAYED RELEASE ORAL ONCE
Status: COMPLETED | OUTPATIENT
Start: 2021-01-03 | End: 2021-01-03

## 2021-01-03 RX ADMIN — PANTOPRAZOLE SODIUM 40 MG: 40 TABLET, DELAYED RELEASE ORAL at 01:44

## 2021-01-03 NOTE — ED PROVIDER NOTES
Subjective   History of Present Illness  40-year-old this emergency department for evaluation of chills and cough.  He says that started with a runny nose yesterday.  She has not had any sore throat but she had a hoarse voice.  She not having any difficulty swallowing.  She is having sinus congestion.  She denies any tooth pain.  She says she feels like her ears are plugged but she is not having any ear pain.  She is not had any fever.  She has not been exposed to Covid recently.  She is most concerned that she could have Covid.      Review of Systems   Constitutional: Negative.    HENT: Negative.         Runny nose, congestion   Eyes: Negative.    Respiratory: Negative.    Cardiovascular: Negative.    Gastrointestinal: Negative.    Endocrine: Negative.    Genitourinary: Negative.    Musculoskeletal: Negative.    Skin: Negative.    Allergic/Immunologic: Negative.    Neurological: Negative.    Hematological: Negative.    Psychiatric/Behavioral: Negative.        Past Medical History:   Diagnosis Date   • Anxiety    • Chronic laryngitis     HISTORY OF   • Depression    • Endometriosis    • Fatigue    • GERD (gastroesophageal reflux disease)     full history in [facility].  SEVERE, on H2 blocker only due ot insurance not covering PPI.  Loses employment opportunities due to hoarseness from reflux.    • Hyperlipidemia    • Hypertension    • Kidney stones    • Migraine    • Prediabetes     HISTORY OF   • Sleep apnea     does not use CPAP due to intolerance   • Spinal headache        No Known Allergies    Past Surgical History:   Procedure Laterality Date   • ABDOMINAL SURGERY     • APPENDECTOMY N/A 12/14/2018    Procedure: APPENDECTOMY LAPAROSCOPIC;  Surgeon: Augustina Ayala MD;  Location: [facility] OR;  Service: General   • BRAVO PROCEDURE N/A 5/8/2017    Procedure: ESOPHAGOGASTRODUODENOSCOPY AND NAILS;  Surgeon: Morris Solares MD;  Location: [facility] OR;  Service:    • BRAVO PROCEDURE N/A 6/10/2019    Procedure:  ESOPHAGOGASTRODUODENOSCOPY AND BRAVO; GE Junction at 36cm;  Surgeon: Morris Solares MD;  [facility]:  [facility];  Service: Gastroenterology   • CARDIOVASCULAR STRESS TEST  2017   •  SECTION PRIMARY      done under general anesthesia b/c couldn't get epidural   •  SECTION WITH TUBAL     • CYSTOSCOPY N/A 2018    Procedure: CYSTOSCOPY;  Surgeon: Ousmane Alvarez MD;  Location: [facility] OR;  Service: Urology   • DIAGNOSTIC LAPAROSCOPY      looking for endometriosis   • DILATATION AND CURETTAGE N/A 10/4/2016    Procedure: DILATATION AND CURETTAGE WITH FROZEN SECTION;  Surgeon: Rajendra Rico MD;  [facility];  for abnormal uterine bleeding   • ECHO - CONVERTED  2017   • ENDOMETRIAL FULGURATION     • ENDOSCOPY      in Dr. Beck Gongora   • EXTRACORPOREAL SHOCK WAVE LITHOTRIPSY (ESWL) Left 2018    Procedure: EXTRACORPOREAL SHOCKWAVE LITHOTRIPSY;  Surgeon: Ousmane Alvarez MD;  Location: [facility] OR;  Service: Urology   • HIATAL HERNIA REPAIR N/A 2018    Procedure: HIATAL HERNIA REPAIR LAPAROSCOPIC;  Surgeon: Morris Solares MD;  Location: [facility] OR;  Service:    • NISSEN FUNDOPLICATION N/A 2018    Procedure: NISSEN FUNDOPLICATION LAPAROSCOPIC ;  Surgeon: Morris Solares MD;  Location: [facility] OR;  Service:    • TOTAL LAPAROSCOPIC HYSTERECTOMY N/A 10/4/2016    Procedure: TOTAL LAPAROSCOPIC HYSTERECTOMY BILATERAL SALPINGO OOPHERECTOMY WITH DAVINCI SI ROBOT;  Surgeon: Rajendra Rico MD;  Location: [facility] OR;  endometriosis/fibroids   • TUBAL ABDOMINAL LIGATION         Family History   Problem Relation Age of Onset   • No Known Problems Mother    • No Known Problems Father    • Obesity Sister    • Hypertension Sister        Social History     Socioeconomic History   • Marital status:      Spouse name: eHath Garsia   • Number of children: 2   • Years of education: high School [employer]    • Highest education level: Not on file   Occupational  History   • Occupation: Unemployed    Tobacco Use   • Smoking status: Current Some Day Smoker     Packs/day: 0.50     Years: 12.00     Pack years: 6.00     Types: Cigarettes   • Smokeless tobacco: Never Used   Substance and Sexual Activity   • Alcohol use: Yes     Comment: occasional   • Drug use: No   • Sexual activity: Defer     Partners: Male     Birth control/protection: Surgical     Comment:     Social History Narrative    Lives with  and children.  Unemployed due to not being able to find work due to hoarse voice.  Denied disability due to hoarseness not that severe.             Objective   Physical Exam  Vitals signs (Vital signs are within normal limits.) and nursing note reviewed. Exam conducted with a chaperone present.   Constitutional:       Appearance: Normal appearance.   HENT:      Head: Normocephalic and atraumatic.      Right Ear: External ear normal.      Left Ear: External ear normal.      Nose: Rhinorrhea present.      Mouth/Throat:      Mouth: Mucous membranes are moist.      Pharynx: Oropharynx is clear.      Comments: Tonsils not inflamed or swollen, there is no exudate.  No posterior pharyngeal edema or erythema.  Eyes:      Extraocular Movements: Extraocular movements intact.      Pupils: Pupils are equal, round, and reactive to light.   Neck:      Musculoskeletal: Normal range of motion and neck supple.      Comments: No lymphadenopathy  Cardiovascular:      Rate and Rhythm: Normal rate and regular rhythm.      Pulses: Normal pulses.      Heart sounds: Normal heart sounds.   Pulmonary:      Effort: Pulmonary effort is normal.      Breath sounds: Normal breath sounds.      Comments: 's are clear to auscultation bilaterally, no wheezing or rhonchi.  Abdominal:      General: Abdomen is flat. Bowel sounds are normal.      Palpations: Abdomen is soft.   Musculoskeletal: Normal range of motion.   Skin:     General: Skin is warm and dry.      Capillary Refill: Capillary refill takes  less than 2 seconds.   Neurological:      General: No focal deficit present.      Mental Status: She is alert and oriented to person, place, and time.   Psychiatric:         Mood and Affect: Mood normal.         Behavior: Behavior normal.         Thought Content: Thought content normal.         Judgment: Judgment normal.         Procedures           ED Course                                           MDM  Number of Diagnoses or Management Options  Viral URI: new and requires workup     Amount and/or Complexity of Data Reviewed  Clinical lab tests: reviewed and ordered  Tests in the radiology section of CPT®: ordered and reviewed  Tests in the medicine section of CPT®: ordered and reviewed  Decide to obtain previous medical records or to obtain history from someone other than the patient: yes  Obtain history from someone other than the patient: yes  Review and summarize past medical records: yes  Independent visualization of images, tracings, or specimens: yes    Risk of Complications, Morbidity, and/or Mortality  Presenting problems: moderate  Diagnostic procedures: moderate  Management options: moderate    Patient Progress  Patient progress: stable      Final diagnoses:   Viral URI            Jaylen Joe DO  01/03/21 0251

## 2021-01-03 NOTE — DISCHARGE INSTRUCTIONS
Is return the emergency department if you develop any worsening shortness of breath or cough.  Up with your PCP for your ongoing GERD.

## 2021-03-15 ENCOUNTER — HOSPITAL ENCOUNTER (EMERGENCY)
Facility: HOSPITAL | Age: 41
Discharge: HOME | End: 2021-03-15
Attending: FAMILY MEDICINE | Admitting: FAMILY MEDICINE

## 2021-03-15 ENCOUNTER — APPOINTMENT (OUTPATIENT)
Dept: CT IMAGING | Facility: HOSPITAL | Age: 41
End: 2021-03-15

## 2021-03-15 ENCOUNTER — APPOINTMENT (OUTPATIENT)
Dept: GENERAL RADIOLOGY | Facility: HOSPITAL | Age: 41
End: 2021-03-15

## 2021-03-15 VITALS
TEMPERATURE: 98.7 F | HEIGHT: 61 IN | SYSTOLIC BLOOD PRESSURE: 135 MMHG | DIASTOLIC BLOOD PRESSURE: 84 MMHG | BODY MASS INDEX: 42.86 KG/M2 | WEIGHT: 227 LBS | HEART RATE: 104 BPM | RESPIRATION RATE: 18 BRPM | OXYGEN SATURATION: 95 %

## 2021-03-15 DIAGNOSIS — S13.4XXA WHIPLASH INJURIES, INITIAL ENCOUNTER: Primary | ICD-10-CM

## 2021-03-15 PROCEDURE — 99283 EMERGENCY DEPT VISIT LOW MDM: CPT

## 2021-03-15 PROCEDURE — 72170 X-RAY EXAM OF PELVIS: CPT

## 2021-03-15 PROCEDURE — 71045 X-RAY EXAM CHEST 1 VIEW: CPT | Performed by: RADIOLOGY

## 2021-03-15 PROCEDURE — 72170 X-RAY EXAM OF PELVIS: CPT | Performed by: RADIOLOGY

## 2021-03-15 PROCEDURE — 71045 X-RAY EXAM CHEST 1 VIEW: CPT

## 2021-03-15 PROCEDURE — 96374 THER/PROPH/DIAG INJ IV PUSH: CPT

## 2021-03-15 PROCEDURE — 25010000002 KETOROLAC TROMETHAMINE PER 15 MG: Performed by: FAMILY MEDICINE

## 2021-03-15 PROCEDURE — 72125 CT NECK SPINE W/O DYE: CPT

## 2021-03-15 PROCEDURE — 70450 CT HEAD/BRAIN W/O DYE: CPT

## 2021-03-15 RX ORDER — KETOROLAC TROMETHAMINE 30 MG/ML
30 INJECTION, SOLUTION INTRAMUSCULAR; INTRAVENOUS ONCE
Status: COMPLETED | OUTPATIENT
Start: 2021-03-15 | End: 2021-03-15

## 2021-03-15 RX ORDER — TIZANIDINE 4 MG/1
4 TABLET ORAL NIGHTLY PRN
Qty: 30 TABLET | Refills: 0 | Status: SHIPPED | OUTPATIENT
Start: 2021-03-15

## 2021-03-15 RX ORDER — SODIUM CHLORIDE 0.9 % (FLUSH) 0.9 %
10 SYRINGE (ML) INJECTION AS NEEDED
Status: DISCONTINUED | OUTPATIENT
Start: 2021-03-15 | End: 2021-03-16 | Stop reason: HOSPADM

## 2021-03-15 RX ORDER — IBUPROFEN 800 MG/1
800 TABLET ORAL
Qty: 30 TABLET | Refills: 0 | Status: SHIPPED | OUTPATIENT
Start: 2021-03-15

## 2021-03-15 RX ADMIN — SODIUM CHLORIDE 1000 ML: 9 INJECTION, SOLUTION INTRAVENOUS at 21:04

## 2021-03-15 RX ADMIN — KETOROLAC TROMETHAMINE 30 MG: 30 INJECTION, SOLUTION INTRAMUSCULAR; INTRAVENOUS at 22:29

## 2021-03-16 ENCOUNTER — BULK ORDERING (OUTPATIENT)
Dept: CASE MANAGEMENT | Facility: OTHER | Age: 41
End: 2021-03-16

## 2021-03-16 DIAGNOSIS — Z23 IMMUNIZATION DUE: ICD-10-CM

## 2021-03-16 NOTE — ED NOTES
Pt being discharged ambulatory to car; NAD noted; respirations even and unlabored; alert and oriented X4; discharge papers given. Pt aware of prescriptions at pharmacy. Pt declined discharge vitals. patient verbalized understanding of follow up and discharge instructions; advised to return to [facility] with any issues       Tyler Pacheco, RN  03/15/21 7508
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
## 2021-03-16 NOTE — ED NOTES
Pt sitting up in stretcher. C Collar in place. Results pending. updated on wait times; verbalized understanding. Skin pwd. denies any needs at this time. will continue to monitor. advised to notify RN of any change in condition.       Tyler Pacheco, RN  03/15/21 7436

## 2021-04-01 ENCOUNTER — OFFICE VISIT (OUTPATIENT)
Dept: PSYCHIATRY | Facility: CLINIC | Age: 41
End: 2021-04-01

## 2021-04-01 VITALS
TEMPERATURE: 96.9 F | WEIGHT: 238 LBS | HEIGHT: 61 IN | SYSTOLIC BLOOD PRESSURE: 142 MMHG | BODY MASS INDEX: 44.93 KG/M2 | DIASTOLIC BLOOD PRESSURE: 81 MMHG | HEART RATE: 84 BPM

## 2021-04-01 DIAGNOSIS — F41.9 ANXIETY DISORDER, UNSPECIFIED TYPE: Primary | ICD-10-CM

## 2021-04-01 DIAGNOSIS — F42.2 MIXED OBSESSIONAL THOUGHTS AND ACTS: ICD-10-CM

## 2021-04-01 DIAGNOSIS — F90.2 ADHD (ATTENTION DEFICIT HYPERACTIVITY DISORDER), COMBINED TYPE: ICD-10-CM

## 2021-04-01 DIAGNOSIS — F33.1 MODERATE EPISODE OF RECURRENT MAJOR DEPRESSIVE DISORDER (HCC): ICD-10-CM

## 2021-04-01 PROCEDURE — 99214 OFFICE O/P EST MOD 30 MIN: CPT | Performed by: NURSE PRACTITIONER

## 2021-04-01 RX ORDER — FLUOXETINE HYDROCHLORIDE 20 MG/1
20 CAPSULE ORAL DAILY
Qty: 14 CAPSULE | Refills: 0 | Status: SHIPPED | OUTPATIENT
Start: 2021-04-01 | End: 2021-08-18

## 2021-04-01 RX ORDER — FLUOXETINE HYDROCHLORIDE 40 MG/1
40 CAPSULE ORAL DAILY
Qty: 30 CAPSULE | Refills: 2 | Status: SHIPPED | OUTPATIENT
Start: 2021-04-01 | End: 2021-08-18

## 2021-04-01 RX ORDER — HYDROXYZINE HYDROCHLORIDE 25 MG/1
25 TABLET, FILM COATED ORAL 2 TIMES DAILY PRN
Qty: 60 TABLET | Refills: 1 | Status: SHIPPED | OUTPATIENT
Start: 2021-04-01 | End: 2021-08-18

## 2021-04-01 NOTE — PROGRESS NOTES
Subjective   Yahir Garsia is a 40 y.o. female is here today for medication management follow-up.    Chief Complaint:  Recheck on depression and anxiety  History of Present Illness:   Pt states she has had high anxiety and depression.  Is not on any medication and feels she needs back on it.  Says she is counting in 3's again all the time.  Denies current SI, HI, or any AVH.  Has had suicidal thoughts when she gets mad within the last few weeks but has never formulated a plan.  Sleep is inconsistent.  Feels nervous and worries all the time.   Body mass index is 44.99 kg/m². having surgery next week on her foot which is also worrying her.  She will not be able to drive etc.  She cannot work longer than 3 hours at a time on her foot.  This causes her anxiety.  Has tolerated prozac in the past.  Just quit meds after being on them for a while.           The following portions of the patient's history were reviewed and updated as appropriate: allergies, current medications, past family history, past medical history, past social history, past surgical history and problem list.    Review of Systems   Constitutional: Negative for activity change, appetite change and fatigue.   HENT: Negative for voice change.    Eyes: Negative for visual disturbance.   Respiratory: Negative.    Cardiovascular: Negative.    Gastrointestinal: Negative for nausea.   Endocrine: Negative.    Genitourinary: Negative.    Musculoskeletal: Negative for arthralgias.   Skin: Negative.    Allergic/Immunologic: Negative.    Neurological: Negative for dizziness, seizures and headaches.   Hematological: Negative.    Psychiatric/Behavioral: Positive for sleep disturbance. Negative for agitation, behavioral problems, confusion, decreased concentration, dysphoric mood, hallucinations, self-injury and suicidal ideas. The patient is nervous/anxious. The patient is not hyperactive.        Objective   Physical Exam   Constitutional: She is oriented to  "person, place, and time. She appears well-developed.   HENT:   Head: Normocephalic and atraumatic.   Neurological: She is alert and oriented to person, place, and time.   Psychiatric: Her speech is normal and behavior is normal. Judgment and thought content normal.   Pleasant and engaging   Vitals reviewed.    Blood pressure 142/81, pulse 84, temperature 96.9 °F (36.1 °C), height 154.9 cm (60.98\"), weight 108 kg (238 lb), last menstrual period 09/30/2016, not currently breastfeeding.    Medication List:   Current Outpatient Medications   Medication Sig Dispense Refill   • HYDROcodone-acetaminophen (NORCO) 5-325 MG per tablet Take 1 tablet by mouth Every 6 (Six) Hours As Needed for Mild Pain . 12 tablet 0   • ibuprofen (ADVIL,MOTRIN) 800 MG tablet Take 1 tablet by mouth 3 (Three) Times a Day With Meals. 30 tablet 0   • ondansetron ODT (ZOFRAN-ODT) 4 MG disintegrating tablet Place 1 tablet on the tongue Every 6 (Six) Hours As Needed for Nausea or Vomiting. 12 tablet 0   • tiZANidine (ZANAFLEX) 4 MG tablet Take 1 tablet by mouth At Night As Needed for Muscle Spasms. 30 tablet 0   • vitamin D (ERGOCALCIFEROL) 76030 units capsule capsule Take 50,000 Units by mouth 1 (One) Time Per Week.  0   • FLUoxetine (PROzac) 20 MG capsule Take 1 capsule by mouth Daily. 14 capsule 0   • FLUoxetine (PROzac) 40 MG capsule Take 1 capsule by mouth Daily. Begin after the 14 days of the 20mg 30 capsule 2   • hydrOXYzine (ATARAX) 25 MG tablet Take 1 tablet by mouth 2 (Two) Times a Day As Needed for Anxiety. 60 tablet 1     No current facility-administered medications for this visit.       Mental Status Exam:   Hygiene:   good  Cooperation:  Cooperative  Eye Contact:  Good  Psychomotor Behavior:  Appropriate  Affect:  Full range  Hopelessness: Denies  Speech:  raspy voice  Thought Process:  Goal directed  Thought Content:  Normal  Suicidal:  None  Homicidal:  None  Hallucinations:  None  Delusion:  None  Memory:  Intact  Orientation:  " Person, Place, Time and Situation  Reliability:  good  Insight:  Good  Judgement:  Good  Impulse Control:  Fair  Physical/Medical Issues:  Yes htn    Assessment/Plan   Problems Addressed this Visit     None      Visit Diagnoses     Anxiety disorder, unspecified type    -  Primary    Relevant Medications    FLUoxetine (PROzac) 20 MG capsule    FLUoxetine (PROzac) 40 MG capsule    hydrOXYzine (ATARAX) 25 MG tablet    Moderate episode of recurrent major depressive disorder (CMS/HCC)        Relevant Medications    FLUoxetine (PROzac) 20 MG capsule    FLUoxetine (PROzac) 40 MG capsule    hydrOXYzine (ATARAX) 25 MG tablet    Mixed obsessional thoughts and acts        Relevant Medications    FLUoxetine (PROzac) 20 MG capsule    FLUoxetine (PROzac) 40 MG capsule    ADHD (attention deficit hyperactivity disorder), combined type        Relevant Medications    FLUoxetine (PROzac) 20 MG capsule    FLUoxetine (PROzac) 40 MG capsule    hydrOXYzine (ATARAX) 25 MG tablet      Diagnoses       Codes Comments    Anxiety disorder, unspecified type    -  Primary ICD-10-CM: F41.9  ICD-9-CM: 300.00     Moderate episode of recurrent major depressive disorder (CMS/HCC)     ICD-10-CM: F33.1  ICD-9-CM: 296.32     Mixed obsessional thoughts and acts     ICD-10-CM: F42.2  ICD-9-CM: 300.3     ADHD (attention deficit hyperactivity disorder), combined type     ICD-10-CM: F90.2  ICD-9-CM: 314.01         Functionality: pt having significant impairment in important areas of daily functioning.  Prognosis: Guarded dependent on medication/follow up and treatment plan compliance.  Yahir Garsia  reports that she has been smoking cigarettes. She has a 6.00 pack-year smoking history. She has never used smokeless tobacco.. I have educated her on the risk of diseases from using tobacco products such as cancer, COPD and heart disease.     I advised her to quit and she is not willing to quit.     Depression: At risk   • PHQ-2 Score: 10       Going to  restart her on prozac.  She will get up to 40mg.  Gave her some hydroxyzine for anxiety.  risks, benefits, side effects of the medication were discussed with patient.     .         Pt does not wish to see a therapist.Continuing efforts to promote the therapeutic alliance, address the patient's issues, and strengthen self awareness, insights, and coping skills  Patient acknowledged and verbally consented.  Patient is agreeable to call the  Clinic.with worsening symptoms.    Patient is aware to call 911 or go to the nearest ER should begin having SI/HI. rtc 8 weeks.             This document has been electronically signed by ЮЛИЯ Montano on   April 1, 2021 11:15 EDT.

## 2021-07-04 ENCOUNTER — HOSPITAL ENCOUNTER (EMERGENCY)
Facility: HOSPITAL | Age: 41
Discharge: HOME | End: 2021-07-05
Attending: FAMILY MEDICINE | Admitting: FAMILY MEDICINE

## 2021-07-04 DIAGNOSIS — S93.402A SPRAIN OF LEFT ANKLE, UNSPECIFIED LIGAMENT, INITIAL ENCOUNTER: Primary | ICD-10-CM

## 2021-07-04 DIAGNOSIS — S93.401A MODERATE RIGHT ANKLE SPRAIN, INITIAL ENCOUNTER: ICD-10-CM

## 2021-07-04 PROCEDURE — 99283 EMERGENCY DEPT VISIT LOW MDM: CPT

## 2021-07-05 ENCOUNTER — APPOINTMENT (OUTPATIENT)
Dept: GENERAL RADIOLOGY | Facility: HOSPITAL | Age: 41
End: 2021-07-05

## 2021-07-05 VITALS
OXYGEN SATURATION: 97 % | DIASTOLIC BLOOD PRESSURE: 88 MMHG | HEART RATE: 88 BPM | TEMPERATURE: 97.4 F | RESPIRATION RATE: 18 BRPM | SYSTOLIC BLOOD PRESSURE: 145 MMHG | BODY MASS INDEX: 44.93 KG/M2 | WEIGHT: 238 LBS | HEIGHT: 61 IN

## 2021-07-05 PROCEDURE — 73590 X-RAY EXAM OF LOWER LEG: CPT

## 2021-07-05 PROCEDURE — 73630 X-RAY EXAM OF FOOT: CPT

## 2021-07-05 PROCEDURE — 73610 X-RAY EXAM OF ANKLE: CPT

## 2021-07-05 RX ORDER — HYDROCODONE BITARTRATE AND ACETAMINOPHEN 5; 325 MG/1; MG/1
1 TABLET ORAL EVERY 6 HOURS PRN
Qty: 8 TABLET | Refills: 0 | Status: SHIPPED | OUTPATIENT
Start: 2021-07-05

## 2021-07-05 NOTE — ED PROVIDER NOTES
Subjective   40-year-old female with a history of prediabetes hypertension (emergency room with left ankle pain.  Patient reports she was walking up the steps at work when she subsequently twisted her left ankle.  She states since that time she had ongoing left ankle pain.  She states pain is made worse with movement.  She has been able to bear weight but reports pain with bearing weight.  She denies fever chills.  She has swelling to affected area.  She denies nausea vomiting she denies hitting her head she denies back pain.  Due to symptoms she came to the emergency room for evaluation.      Ankle Injury  Location:  Left ankle  Severity:  Mild  Timing:  Constant  Progression:  Unchanged  Associated symptoms: no abdominal pain, no chest pain, no congestion, no fatigue, no fever, no vomiting and no wheezing        Review of Systems   Constitutional: Negative for fatigue and fever.   HENT: Negative for congestion.    Respiratory: Negative for wheezing.    Cardiovascular: Negative for chest pain.   Gastrointestinal: Negative for abdominal pain and vomiting.   All other systems reviewed and are negative.      Past Medical History:   Diagnosis Date   • Anxiety    • Chronic laryngitis     HISTORY OF   • Depression    • Endometriosis    • Fatigue    • GERD (gastroesophageal reflux disease)     full history in [facility].  SEVERE, on H2 blocker only due ot insurance not covering PPI.  Loses employment opportunities due to hoarseness from reflux.    • Hyperlipidemia    • Hypertension    • Kidney stones    • Migraine    • Prediabetes     HISTORY OF   • Sleep apnea     does not use CPAP due to intolerance   • Spinal headache        No Known Allergies    Past Surgical History:   Procedure Laterality Date   • ABDOMINAL SURGERY     • APPENDECTOMY N/A 12/14/2018    Procedure: APPENDECTOMY LAPAROSCOPIC;  Surgeon: Augustina Ayala MD;  Location: [facility];  Service: General   • BRAVO PROCEDURE N/A 5/8/2017    Procedure:  ESOPHAGOGASTRODUODENOSCOPY AND NAILS;  Surgeon: Morris Solares MD;  [facility] OR;  Service:    • BRAVO PROCEDURE N/A 6/10/2019    Procedure: ESOPHAGOGASTRODUODENOSCOPY AND BRAVO; GE Junction at 36cm;  Surgeon: Morris Solares MD;  Location: [facility] OR;  Service: Gastroenterology   • CARDIOVASCULAR STRESS TEST  2017   •  SECTION PRIMARY      done under general anesthesia b/c couldn't get epidural   •  SECTION WITH TUBAL     • CYSTOSCOPY N/A 2018    Procedure: CYSTOSCOPY;  Surgeon: Ousmane Alvarez MD;  [facility] OR;  Service: Urology   • DIAGNOSTIC LAPAROSCOPY      looking for endometriosis   • DILATATION AND CURETTAGE N/A 10/4/2016    Procedure: DILATATION AND CURETTAGE WITH FROZEN SECTION;  Surgeon: Rajendra Rico MD;  [facility]:  [facility] OR;  for abnormal uterine bleeding   • ECHO - CONVERTED  2017   • ENDOMETRIAL FULGURATION     • ENDOSCOPY      in Dr. Beck Gongora   • EXTRACORPOREAL SHOCK WAVE LITHOTRIPSY (ESWL) Left 2018    Procedure: EXTRACORPOREAL SHOCKWAVE LITHOTRIPSY;  Surgeon: Ousmane Alvarez MD;  Location: [facility] OR;  Service: Urology   • HIATAL HERNIA REPAIR N/A 2018    Procedure: HIATAL HERNIA REPAIR LAPAROSCOPIC;  Surgeon: Morris Solares MD;  Location: [facility] OR;  Service:    • NISSEN FUNDOPLICATION N/A 2018    Procedure: NISSEN FUNDOPLICATION LAPAROSCOPIC ;  Surgeon: Morris Solares MD;  Location: [facility] OR;  Service:    • TOTAL LAPAROSCOPIC HYSTERECTOMY N/A 10/4/2016    Procedure: TOTAL LAPAROSCOPIC HYSTERECTOMY BILATERAL SALPINGO OOPHERECTOMY WITH DAVINCI SI ROBOT;  Surgeon: Rajendra Rico MD;  [facility] OR;  endometriosis/fibroids   • TUBAL ABDOMINAL LIGATION  2006       Family History   Problem Relation Age of Onset   • No Known Problems Mother    • No Known Problems Father    • Obesity Sister    • Hypertension Sister        Social History     Socioeconomic History   • Marital status:      Spouse  name: Heath Garsia   • Number of children: 2   • Years of education: high School [employer]    • Highest education level: Not on file   Tobacco Use   • Smoking status: Current Some Day Smoker     Packs/day: 0.50     Years: 12.00     Pack years: 6.00     Types: Cigarettes   • Smokeless tobacco: Never Used   Substance and Sexual Activity   • Alcohol use: Yes     Comment: occasional   • Drug use: No   • Sexual activity: Defer     Partners: Male     Birth control/protection: Surgical     Comment:             Objective   Physical Exam  Vitals and nursing note reviewed.   Constitutional:       Appearance: She is not ill-appearing.   HENT:      Head: Normocephalic and atraumatic.      Mouth/Throat:      Mouth: Mucous membranes are moist.   Eyes:      Extraocular Movements: Extraocular movements intact.      Pupils: Pupils are equal, round, and reactive to light.   Cardiovascular:      Rate and Rhythm: Normal rate and regular rhythm.      Comments: 2+ dorsalis pedis 2+ radial pulse bilaterally.  Pulmonary:      Effort: Pulmonary effort is normal.      Breath sounds: Normal breath sounds.   Abdominal:      General: Bowel sounds are normal.      Palpations: Abdomen is soft.   Musculoskeletal:      Cervical back: Neck supple.      Comments: Left ankle anterior tenderness palpation.  Left proximal anterior foot with tenderness palpation.  Mild swelling.  Patient able to paula and invert flex extend foot without difficulty.  No Achilles tenderness.  Sensation intact bilaterally.  Left knee negative Lachman no tenderness palpation.   Skin:     General: Skin is warm and dry.   Neurological:      General: No focal deficit present.      Mental Status: She is alert and oriented to person, place, and time.      Sensory: No sensory deficit.   Psychiatric:         Mood and Affect: Mood normal.         Procedures           ED Course  ED Course as of Jul 05 0221 Mon Jul 05, 2021 0216 Patient's plain films unremarkable.  Discussed  limitations of plain plus.  Patient be given crutches as well as a walking boot.  Discussed need to follow-up with orthopedics.  Discussed warning signs symptoms warrant emergency room patient verbalized understanding.  Patient is neurovascular intact prior to discharge.  Patient was fitted for a walking boot and crutches she tolerated procedure well.    [BB]      ED Course User Index  [BB] Nagi Tapia MD                                           [facility]    Final diagnoses:   Sprain of left ankle, unspecified ligament, initial encounter       ED Disposition  ED Disposition     ED Disposition Condition Comment    Discharge Stable           Brayan Valverde MD  [address] [place] [address] [address] [facility] [phone]               Where to Get Your Medications      These medications were sent to [facility] [address] [place] [address] - [phone]  - [phone] [place] [id number]    Phone: [phone]   · HYDROcodone-acetaminophen 5-325 MG per tablet        Medication List      No changes were made to your prescriptions during this visit.          Nagi Tapia MD  07/05/21 0226

## 2021-07-14 ENCOUNTER — TRANSCRIBE ORDERS (OUTPATIENT)
Dept: ADMINISTRATIVE | Facility: HOSPITAL | Age: 41
End: 2021-07-14

## 2021-07-14 ENCOUNTER — LAB (OUTPATIENT)
Dept: LAB | Facility: HOSPITAL | Age: 41
End: 2021-07-14

## 2021-07-14 DIAGNOSIS — J02.9 PHARYNGITIS, UNSPECIFIED ETIOLOGY: Primary | ICD-10-CM

## 2021-07-14 DIAGNOSIS — R07.0 PAIN IN THROAT: Primary | ICD-10-CM

## 2021-07-14 DIAGNOSIS — J02.9 PHARYNGITIS, UNSPECIFIED ETIOLOGY: ICD-10-CM

## 2021-07-14 PROCEDURE — 87205 SMEAR GRAM STAIN: CPT

## 2021-07-14 PROCEDURE — 87070 CULTURE OTHR SPECIMN AEROBIC: CPT

## 2021-07-15 ENCOUNTER — HOSPITAL ENCOUNTER (OUTPATIENT)
Dept: ULTRASOUND IMAGING | Facility: HOSPITAL | Age: 41
Discharge: HOME | End: 2021-07-15

## 2021-07-15 ENCOUNTER — LAB (OUTPATIENT)
Dept: LAB | Facility: HOSPITAL | Age: 41
End: 2021-07-15

## 2021-07-15 ENCOUNTER — TRANSCRIBE ORDERS (OUTPATIENT)
Dept: ADMINISTRATIVE | Facility: HOSPITAL | Age: 41
End: 2021-07-15

## 2021-07-15 DIAGNOSIS — R07.0 PAIN IN THROAT: ICD-10-CM

## 2021-07-15 DIAGNOSIS — R53.83 FATIGUE, UNSPECIFIED TYPE: Primary | ICD-10-CM

## 2021-07-15 DIAGNOSIS — R53.83 FATIGUE, UNSPECIFIED TYPE: ICD-10-CM

## 2021-07-15 PROCEDURE — 36415 COLL VENOUS BLD VENIPUNCTURE: CPT

## 2021-07-15 PROCEDURE — 84480 ASSAY TRIIODOTHYRONINE (T3): CPT

## 2021-07-15 PROCEDURE — 76536 US EXAM OF HEAD AND NECK: CPT | Performed by: RADIOLOGY

## 2021-07-15 PROCEDURE — 84436 ASSAY OF TOTAL THYROXINE: CPT

## 2021-07-15 PROCEDURE — 76536 US EXAM OF HEAD AND NECK: CPT

## 2021-07-15 PROCEDURE — 84443 ASSAY THYROID STIM HORMONE: CPT

## 2021-07-16 LAB
BACTERIA SPEC AEROBE CULT: NORMAL
GRAM STN SPEC: NORMAL
T3 SERPL-MCNC: 96.4 NG/DL (ref 80–200)
T4 SERPL-MCNC: 5.93 MCG/DL (ref 4.5–11.7)
TSH SERPL DL<=0.05 MIU/L-ACNC: 1.7 UIU/ML (ref 0.27–4.2)

## 2021-08-18 ENCOUNTER — OFFICE VISIT (OUTPATIENT)
Dept: PSYCHIATRY | Facility: CLINIC | Age: 41
End: 2021-08-18

## 2021-08-18 VITALS
BODY MASS INDEX: 45.01 KG/M2 | SYSTOLIC BLOOD PRESSURE: 129 MMHG | HEART RATE: 100 BPM | WEIGHT: 238.4 LBS | DIASTOLIC BLOOD PRESSURE: 83 MMHG | HEIGHT: 61 IN

## 2021-08-18 DIAGNOSIS — F41.9 ANXIETY DISORDER, UNSPECIFIED TYPE: Primary | ICD-10-CM

## 2021-08-18 DIAGNOSIS — F33.1 MODERATE EPISODE OF RECURRENT MAJOR DEPRESSIVE DISORDER (HCC): ICD-10-CM

## 2021-08-18 DIAGNOSIS — F42.2 MIXED OBSESSIONAL THOUGHTS AND ACTS: ICD-10-CM

## 2021-08-18 PROCEDURE — 99214 OFFICE O/P EST MOD 30 MIN: CPT | Performed by: NURSE PRACTITIONER

## 2021-08-18 RX ORDER — DULOXETIN HYDROCHLORIDE 30 MG/1
30 CAPSULE, DELAYED RELEASE ORAL DAILY
Qty: 14 CAPSULE | Refills: 0 | Status: SHIPPED | OUTPATIENT
Start: 2021-08-18 | End: 2022-08-18

## 2021-08-18 RX ORDER — DULOXETIN HYDROCHLORIDE 60 MG/1
60 CAPSULE, DELAYED RELEASE ORAL DAILY
Qty: 310 CAPSULE | Refills: 1 | Status: SHIPPED | OUTPATIENT
Start: 2021-08-18

## 2021-08-18 NOTE — PROGRESS NOTES
"      Subjective   Yahir Garsia is a 41 y.o. female is here today for medication management follow-up.    Chief Complaint:  Recheck on depression and anxiety  History of Present Illness:   Pt states she has not had her prozac in a few weeks.  Says \"it was not helping anyway\".  Says she continues to have high anxiety.  Denies current depression.  Denies panic attacks.  Describes her anxiety as just feeling nervous all the time and on edge.  Sleep is inconsistent.  She denies any SI.  Mood is stable.  Not using the hydroxyzine.  Body mass index is 45.07 kg/m². continues to have issues with her voice.  Does feel like she would like to try another medication for anxiety          The following portions of the patient's history were reviewed and updated as appropriate: allergies, current medications, past family history, past medical history, past social history, past surgical history and problem list.    Review of Systems   Constitutional: Negative for activity change, appetite change and fatigue.   HENT: Negative for voice change.    Eyes: Negative for visual disturbance.   Respiratory: Negative.    Cardiovascular: Negative.    Gastrointestinal: Negative for nausea.   Endocrine: Negative.    Genitourinary: Negative.    Musculoskeletal: Negative for arthralgias.   Skin: Negative.    Allergic/Immunologic: Negative.    Neurological: Negative for dizziness, seizures and headaches.   Hematological: Negative.    Psychiatric/Behavioral: Positive for sleep disturbance. Negative for agitation, behavioral problems, confusion, decreased concentration, dysphoric mood, hallucinations, self-injury and suicidal ideas. The patient is nervous/anxious. The patient is not hyperactive.      Reviewed copied data and there are no changes    Objective   Physical Exam   Constitutional: She is oriented to person, place, and time. She appears well-developed.   HENT:   Head: Normocephalic and atraumatic.   Neurological: She is alert and " "oriented to person, place, and time.   Psychiatric: Her speech is normal and behavior is normal. Judgment and thought content normal.   Pleasant and engaging   Vitals reviewed.    Blood pressure 129/83, pulse 100, height 154.9 cm (60.98\"), weight 108 kg (238 lb 6.4 oz), last menstrual period 09/30/2016, not currently breastfeeding.    Medication List:   Current Outpatient Medications   Medication Sig Dispense Refill   • HYDROcodone-acetaminophen (NORCO) 5-325 MG per tablet Take 1 tablet by mouth Every 6 (Six) Hours As Needed for Mild Pain . 8 tablet 0   • ibuprofen (ADVIL,MOTRIN) 800 MG tablet Take 1 tablet by mouth 3 (Three) Times a Day With Meals. 30 tablet 0   • ondansetron ODT (ZOFRAN-ODT) 4 MG disintegrating tablet Place 1 tablet on the tongue Every 6 (Six) Hours As Needed for Nausea or Vomiting. 12 tablet 0   • tiZANidine (ZANAFLEX) 4 MG tablet Take 1 tablet by mouth At Night As Needed for Muscle Spasms. 30 tablet 0   • vitamin D (ERGOCALCIFEROL) 74604 units capsule capsule Take 50,000 Units by mouth 1 (One) Time Per Week.  0   • DULoxetine (Cymbalta) 30 MG capsule Take 1 capsule by mouth Daily. For 14 days 14 capsule 0   • DULoxetine (Cymbalta) 60 MG capsule Take 1 capsule by mouth Daily. Begin after the 14 days of 30mg 310 capsule 1     No current facility-administered medications for this visit.     Reviewed copied data and there are no changes    Mental Status Exam:   Hygiene:   good  Cooperation:  Cooperative  Eye Contact:  Good  Psychomotor Behavior:  Appropriate  Affect:  Full range  Hopelessness: Denies  Speech:  raspy voice  Thought Process:  Goal directed  Thought Content:  Normal  Suicidal:  None  Homicidal:  None  Hallucinations:  None  Delusion:  None  Memory:  Intact  Orientation:  Person, Place, Time and Situation  Reliability:  good  Insight:  Good  Judgement:  Good  Impulse Control:  Fair  Physical/Medical Issues:  Yes htn    Assessment/Plan   Problems Addressed this Visit     None      Visit " Diagnoses     Anxiety disorder, unspecified type    -  Primary    Relevant Medications    DULoxetine (Cymbalta) 30 MG capsule    DULoxetine (Cymbalta) 60 MG capsule    Moderate episode of recurrent major depressive disorder (CMS/HCC)        Relevant Medications    DULoxetine (Cymbalta) 30 MG capsule    DULoxetine (Cymbalta) 60 MG capsule    Mixed obsessional thoughts and acts        Relevant Medications    DULoxetine (Cymbalta) 30 MG capsule    DULoxetine (Cymbalta) 60 MG capsule      Diagnoses       Codes Comments    Anxiety disorder, unspecified type    -  Primary ICD-10-CM: F41.9  ICD-9-CM: 300.00     Moderate episode of recurrent major depressive disorder (CMS/HCC)     ICD-10-CM: F33.1  ICD-9-CM: 296.32     Mixed obsessional thoughts and acts     ICD-10-CM: F42.2  ICD-9-CM: 300.3         Functionality: pt having significant impairment in important areas of daily functioning.  Prognosis: Good dependent on medication/follow up and treatment plan compliance.      I am trying pt on cymbalta.  She will take 30mg a day for 14 days then increase to 60mg a day.  Reviewed the risks, benefits, side effects of the medication including the onset of action..         Pt does not wish to see a therapist.Continuing efforts to promote the therapeutic alliance, address the patient's issues, and strengthen self awareness, insights, and coping skills  Patient acknowledged and verbally consented.  Patient is agreeable to call the  Clinic.with worsening symptoms.    Patient is aware to call 911 or go to the nearest ER should begin having SI/HI. rtc 7 weeks.             This document has been electronically signed by ЮЛИЯ Montano on   August 18, 2021 14:30 EDT.

## 2022-11-22 ENCOUNTER — APPOINTMENT (OUTPATIENT)
Dept: MAMMOGRAPHY | Facility: HOSPITAL | Age: 42
End: 2022-11-22

## 2022-12-25 ENCOUNTER — HOSPITAL ENCOUNTER (EMERGENCY)
Facility: HOSPITAL | Age: 42
Discharge: HOME | End: 2022-12-25
Attending: STUDENT IN AN ORGANIZED HEALTH CARE EDUCATION/TRAINING PROGRAM | Admitting: STUDENT IN AN ORGANIZED HEALTH CARE EDUCATION/TRAINING PROGRAM
Payer: COMMERCIAL

## 2022-12-25 VITALS
TEMPERATURE: 97.1 F | HEIGHT: 61 IN | HEART RATE: 89 BPM | DIASTOLIC BLOOD PRESSURE: 85 MMHG | RESPIRATION RATE: 16 BRPM | WEIGHT: 226 LBS | SYSTOLIC BLOOD PRESSURE: 153 MMHG | OXYGEN SATURATION: 100 % | BODY MASS INDEX: 42.67 KG/M2

## 2022-12-25 DIAGNOSIS — S61.215A LACERATION OF LEFT RING FINGER, FOREIGN BODY PRESENCE UNSPECIFIED, NAIL DAMAGE STATUS UNSPECIFIED, INITIAL ENCOUNTER: Primary | ICD-10-CM

## 2022-12-25 PROCEDURE — 90471 IMMUNIZATION ADMIN: CPT | Performed by: PHYSICIAN ASSISTANT

## 2022-12-25 PROCEDURE — 90715 TDAP VACCINE 7 YRS/> IM: CPT | Performed by: PHYSICIAN ASSISTANT

## 2022-12-25 PROCEDURE — 25010000002 TETANUS-DIPHTH-ACELL PERTUSSIS 5-2.5-18.5 LF-MCG/0.5 SUSPENSION PREFILLED SYRINGE: Performed by: PHYSICIAN ASSISTANT

## 2022-12-25 PROCEDURE — 99282 EMERGENCY DEPT VISIT SF MDM: CPT

## 2022-12-25 RX ORDER — LIDOCAINE HYDROCHLORIDE 10 MG/ML
10 INJECTION, SOLUTION EPIDURAL; INFILTRATION; INTRACAUDAL; PERINEURAL ONCE
Status: COMPLETED | OUTPATIENT
Start: 2022-12-25 | End: 2022-12-25

## 2022-12-25 RX ADMIN — TETANUS TOXOID, REDUCED DIPHTHERIA TOXOID AND ACELLULAR PERTUSSIS VACCINE, ADSORBED 0.5 ML: 5; 2.5; 8; 8; 2.5 SUSPENSION INTRAMUSCULAR at 12:05

## 2022-12-25 RX ADMIN — LIDOCAINE HYDROCHLORIDE 10 ML: 10 INJECTION, SOLUTION EPIDURAL; INFILTRATION; INTRACAUDAL; PERINEURAL at 12:06

## 2022-12-25 NOTE — ED PROVIDER NOTES
Subjective   History of Present Illness  42-year-old female presents secondary to left fourth finger laceration to the distal tip.  Patient's not up-to-date on her tetanus shot.  This occurred approximately 2 hours ago.  She states he was slicing potatoes for [employer] dinner.  She voices no other complaints this time.        Review of Systems   Constitutional: Negative.  Negative for fever.   HENT: Negative.    Respiratory: Negative.    Cardiovascular: Negative.  Negative for chest pain.   Gastrointestinal: Negative.  Negative for abdominal pain.   Endocrine: Negative.    Genitourinary: Negative.  Negative for dysuria.   Skin: Negative.    Neurological: Negative.    Psychiatric/Behavioral: Negative.    All other systems reviewed and are negative.      Past Medical History:   Diagnosis Date   • Anxiety    • Chronic laryngitis     HISTORY OF   • Depression    • Endometriosis    • Fatigue    • GERD (gastroesophageal reflux disease)     full history in [facility].  SEVERE, on H2 blocker only due ot insurance not covering PPI.  Loses employment opportunities due to hoarseness from reflux.    • Hyperlipidemia    • Hypertension    • Kidney stones    • Migraine    • Prediabetes     HISTORY OF   • Sleep apnea     does not use CPAP due to intolerance   • Spinal headache        No Known Allergies    Past Surgical History:   Procedure Laterality Date   • ABDOMINAL SURGERY     • APPENDECTOMY N/A 2018    Procedure: APPENDECTOMY LAPAROSCOPIC;  Surgeon: Augustina Ayala MD;  Location: [facility] OR;  Service: General   • BRAVO PROCEDURE N/A 2017    Procedure: ESOPHAGOGASTRODUODENOSCOPY AND NAILS;  Surgeon: Morris Solares MD;  Location: [facility] OR;  Service:    • BRAVO PROCEDURE N/A 6/10/2019    Procedure: ESOPHAGOGASTRODUODENOSCOPY AND BRAVO; GE Junction at 36cm;  Surgeon: Morris Solares MD;  Location: [facility] OR;  Service: Gastroenterology   • CARDIOVASCULAR STRESS TEST  2017   •  SECTION PRIMARY      done under  general anesthesia b/c couldn't get epidural   •  SECTION WITH TUBAL     • CYSTOSCOPY N/A 2018    Procedure: CYSTOSCOPY;  Surgeon: Ousmane Alvarez MD;  Location: [facility] OR;  Service: Urology   • DIAGNOSTIC LAPAROSCOPY      looking for endometriosis   • DILATATION AND CURETTAGE N/A 10/4/2016    Procedure: DILATATION AND CURETTAGE WITH FROZEN SECTION;  Surgeon: Rajendra Rico MD;  Location: [facility] OR;  for abnormal uterine bleeding   • ECHO - CONVERTED  2017   • ENDOMETRIAL FULGURATION     • ENDOSCOPY      in Dr. Beck Gongora   • EXTRACORPOREAL SHOCK WAVE LITHOTRIPSY (ESWL) Left 2018    Procedure: EXTRACORPOREAL SHOCKWAVE LITHOTRIPSY;  Surgeon: Ousmane Alvarez MD;  Location: [facility] OR;  Service: Urology   • HIATAL HERNIA REPAIR N/A 2018    Procedure: HIATAL HERNIA REPAIR LAPAROSCOPIC;  Surgeon: Morris Solares MD;  Location: [facility] OR;  Service:    • NISSEN FUNDOPLICATION N/A 2018    Procedure: NISSEN FUNDOPLICATION LAPAROSCOPIC ;  Surgeon: Morris Solares MD;  Location: [facility] OR;  Service:    • TOTAL LAPAROSCOPIC HYSTERECTOMY N/A 10/4/2016    Procedure: TOTAL LAPAROSCOPIC HYSTERECTOMY BILATERAL SALPINGO OOPHERECTOMY WITH DAVINCI SI ROBOT;  Surgeon: Rajendra Rico MD;  Location: [facility] OR;  endometriosis/fibroids   • TUBAL ABDOMINAL LIGATION         Family History   Problem Relation Age of Onset   • No Known Problems Mother    • No Known Problems Father    • Obesity Sister    • Hypertension Sister        Social History     Socioeconomic History   • Marital status:      Spouse name: Heath Garsia   • Number of children: 2   • Years of education: high School [employer]    Tobacco Use   • Smoking status: Some Days     Packs/day: 0.50     Years: 12.00     Pack years: 6.00     Types: Cigarettes   • Smokeless tobacco: Never   Substance and Sexual Activity   • Alcohol use: Yes     Comment: occasional   • Drug use: No   • Sexual activity: Defer     Partners:  Male     Birth control/protection: Surgical     Comment:             Objective   Physical Exam  Vitals and nursing note reviewed.   Constitutional:       General: She is not in acute distress.     Appearance: She is well-developed. She is not diaphoretic.   HENT:      Head: Normocephalic and atraumatic.      Right Ear: External ear normal.      Left Ear: External ear normal.      Nose: Nose normal.   Eyes:      Conjunctiva/sclera: Conjunctivae normal.      Pupils: Pupils are equal, round, and reactive to light.   Neck:      Vascular: No JVD.      Trachea: No tracheal deviation.   Cardiovascular:      Rate and Rhythm: Normal rate and regular rhythm.      Heart sounds: Normal heart sounds. No murmur heard.  Pulmonary:      Effort: Pulmonary effort is normal. No respiratory distress.      Breath sounds: Normal breath sounds. No wheezing.   Abdominal:      General: Bowel sounds are normal.      Palpations: Abdomen is soft.      Tenderness: There is no abdominal tenderness.   Musculoskeletal:         General: No deformity. Normal range of motion.      Cervical back: Normal range of motion and neck supple.      Comments: 1 cm laceration to the very distal tip of patient's left fourth finger.  Neurovascularly intact.  This was a near avulsion.  Tissue looks to be viable however patient was counseled that the tissue may be nonviable.   Skin:     General: Skin is warm and dry.      Coloration: Skin is not pale.      Findings: No erythema or rash.   Neurological:      Mental Status: She is alert and oriented to person, place, and time.      Cranial Nerves: No cranial nerve deficit.   Psychiatric:         Behavior: Behavior normal.         Thought Content: Thought content normal.         Laceration Repair    Date/Time: 12/29/2022 5:35 PM  Performed by: Fuentes Alex PA  Authorized by: Sunitha Calderon MD     Consent:     Consent obtained:  Verbal    Consent given by:  Patient    Risks discussed:  Infection and  pain  Universal protocol:     Patient identity confirmed:  Verbally with patient  Anesthesia:     Anesthesia method:  Nerve block    Block location:  Digital    Block needle gauge:  25 G    Block anesthetic:  Lidocaine 1% w/o epi    Block technique:  Digital    Block injection procedure:  Anatomic landmarks identified, introduced needle, incremental injection, anatomic landmarks palpated and negative aspiration for blood    Block outcome:  Anesthesia achieved  Laceration details:     Location:  Finger    Finger location:  L ring finger    Length (cm):  1  Pre-procedure details:     Preparation:  Patient was prepped and draped in usual sterile fashion  Exploration:     Hemostasis achieved with:  Direct pressure    Wound exploration: wound explored through full range of motion and entire depth of wound visualized      Wound extent: no nerve damage noted, no tendon damage noted, no underlying fracture noted and no vascular damage noted      Contaminated: no    Treatment:     Area cleansed with:  Saline    Amount of cleaning:  Standard    Irrigation solution:  Sterile saline    Irrigation volume:  250    Irrigation method:  Tap    Debridement:  None    Undermining:  None  Skin repair:     Repair method:  Sutures    Suture size:  5-0    Suture material:  Nylon    Suture technique:  Simple interrupted    Number of sutures:  3  Approximation:     Approximation:  Close  Repair type:     Repair type:  Simple  Post-procedure details:     Dressing:  Antibiotic ointment and bulky dressing    Procedure completion:  Tolerated               ED Course                                           MDM  Number of Diagnoses or Management Options  Laceration of left ring finger, foreign body presence unspecified, nail damage status unspecified, initial encounter: new and does not require workup      Final diagnoses:   Laceration of left ring finger, foreign body presence unspecified, nail damage status unspecified, initial encounter        ED Disposition  ED Disposition     ED Disposition   Discharge    Condition   Stable    Comment   --             Kathy Rhodes, APRN  [address] [facility] [address]  [phone]    In 10 days  For suture removal    [facility] Emergency Department  [address] [place] [id number]  [phone]    If symptoms worsen         Medication List      No changes were made to your prescriptions during this visit.          Fuentes Alex PA  12/25/22 1429       Fuentes Alex PA  12/29/22 1731

## 2022-12-25 NOTE — ED NOTES
Lac repair complete, teaching re: suture care complete, pt v/u, 2x2 and onesimo applied, patient terrance well

## 2023-06-07 ENCOUNTER — APPOINTMENT (OUTPATIENT)
Dept: CT IMAGING | Facility: HOSPITAL | Age: 43
End: 2023-06-07
Payer: COMMERCIAL

## 2023-06-07 ENCOUNTER — HOSPITAL ENCOUNTER (EMERGENCY)
Facility: HOSPITAL | Age: 43
Discharge: HOME | End: 2023-06-07
Attending: STUDENT IN AN ORGANIZED HEALTH CARE EDUCATION/TRAINING PROGRAM | Admitting: STUDENT IN AN ORGANIZED HEALTH CARE EDUCATION/TRAINING PROGRAM
Payer: COMMERCIAL

## 2023-06-07 VITALS
DIASTOLIC BLOOD PRESSURE: 80 MMHG | BODY MASS INDEX: 44.37 KG/M2 | RESPIRATION RATE: 16 BRPM | WEIGHT: 235 LBS | OXYGEN SATURATION: 100 % | TEMPERATURE: 97.8 F | HEART RATE: 98 BPM | HEIGHT: 61 IN | SYSTOLIC BLOOD PRESSURE: 162 MMHG

## 2023-06-07 DIAGNOSIS — R10.9 FLANK PAIN, ACUTE: ICD-10-CM

## 2023-06-07 DIAGNOSIS — R31.9 HEMATURIA, UNSPECIFIED TYPE: Primary | ICD-10-CM

## 2023-06-07 LAB
ALBUMIN SERPL-MCNC: 4.2 G/DL (ref 3.5–5.2)
ALBUMIN/GLOB SERPL: 1.4 G/DL
ALP SERPL-CCNC: 118 U/L (ref 39–117)
ALT SERPL W P-5'-P-CCNC: 17 U/L (ref 1–33)
ANION GAP SERPL CALCULATED.3IONS-SCNC: 11.7 MMOL/L (ref 5–15)
AST SERPL-CCNC: 20 U/L (ref 1–32)
BACTERIA UR QL AUTO: ABNORMAL /HPF
BASOPHILS # BLD AUTO: 0.08 10*3/MM3 (ref 0–0.2)
BASOPHILS NFR BLD AUTO: 0.9 % (ref 0–1.5)
BILIRUB SERPL-MCNC: 0.4 MG/DL (ref 0–1.2)
BILIRUB UR QL STRIP: NEGATIVE
BUN SERPL-MCNC: 12 MG/DL (ref 6–20)
BUN/CREAT SERPL: 12.1 (ref 7–25)
CALCIUM SPEC-SCNC: 9 MG/DL (ref 8.6–10.5)
CHLORIDE SERPL-SCNC: 107 MMOL/L (ref 98–107)
CLARITY UR: CLEAR
CO2 SERPL-SCNC: 24.3 MMOL/L (ref 22–29)
COLOR UR: YELLOW
CREAT SERPL-MCNC: 0.99 MG/DL (ref 0.57–1)
CRP SERPL-MCNC: 1.71 MG/DL (ref 0–0.5)
DEPRECATED RDW RBC AUTO: 48 FL (ref 37–54)
EGFRCR SERPLBLD CKD-EPI 2021: 73.2 ML/MIN/1.73
EOSINOPHIL # BLD AUTO: 0.19 10*3/MM3 (ref 0–0.4)
EOSINOPHIL NFR BLD AUTO: 2.1 % (ref 0.3–6.2)
ERYTHROCYTE [DISTWIDTH] IN BLOOD BY AUTOMATED COUNT: 13.4 % (ref 12.3–15.4)
GLOBULIN UR ELPH-MCNC: 3 GM/DL
GLUCOSE SERPL-MCNC: 108 MG/DL (ref 65–99)
GLUCOSE UR STRIP-MCNC: NEGATIVE MG/DL
HCT VFR BLD AUTO: 43.8 % (ref 34–46.6)
HGB BLD-MCNC: 13.9 G/DL (ref 12–15.9)
HGB UR QL STRIP.AUTO: ABNORMAL
HOLD SPECIMEN: NORMAL
HOLD SPECIMEN: NORMAL
HYALINE CASTS UR QL AUTO: ABNORMAL /LPF
IMM GRANULOCYTES # BLD AUTO: 0.04 10*3/MM3 (ref 0–0.05)
IMM GRANULOCYTES NFR BLD AUTO: 0.5 % (ref 0–0.5)
KETONES UR QL STRIP: NEGATIVE
LEUKOCYTE ESTERASE UR QL STRIP.AUTO: NEGATIVE
LYMPHOCYTES # BLD AUTO: 2.18 10*3/MM3 (ref 0.7–3.1)
LYMPHOCYTES NFR BLD AUTO: 24.6 % (ref 19.6–45.3)
MCH RBC QN AUTO: 30.5 PG (ref 26.6–33)
MCHC RBC AUTO-ENTMCNC: 31.7 G/DL (ref 31.5–35.7)
MCV RBC AUTO: 96.1 FL (ref 79–97)
MONOCYTES # BLD AUTO: 0.59 10*3/MM3 (ref 0.1–0.9)
MONOCYTES NFR BLD AUTO: 6.7 % (ref 5–12)
NEUTROPHILS NFR BLD AUTO: 5.79 10*3/MM3 (ref 1.7–7)
NEUTROPHILS NFR BLD AUTO: 65.2 % (ref 42.7–76)
NITRITE UR QL STRIP: NEGATIVE
NRBC BLD AUTO-RTO: 0 /100 WBC (ref 0–0.2)
PH UR STRIP.AUTO: 6.5 [PH] (ref 5–8)
PLATELET # BLD AUTO: 293 10*3/MM3 (ref 140–450)
PMV BLD AUTO: 9.5 FL (ref 6–12)
POTASSIUM SERPL-SCNC: 4.3 MMOL/L (ref 3.5–5.2)
PROT SERPL-MCNC: 7.2 G/DL (ref 6–8.5)
PROT UR QL STRIP: NEGATIVE
RBC # BLD AUTO: 4.56 10*6/MM3 (ref 3.77–5.28)
RBC # UR STRIP: ABNORMAL /HPF
REF LAB TEST METHOD: ABNORMAL
SODIUM SERPL-SCNC: 143 MMOL/L (ref 136–145)
SP GR UR STRIP: 1.01 (ref 1–1.03)
SQUAMOUS #/AREA URNS HPF: ABNORMAL /HPF
UROBILINOGEN UR QL STRIP: ABNORMAL
WBC # UR STRIP: ABNORMAL /HPF
WBC NRBC COR # BLD: 8.87 10*3/MM3 (ref 3.4–10.8)
WHOLE BLOOD HOLD COAG: NORMAL
WHOLE BLOOD HOLD SPECIMEN: NORMAL

## 2023-06-07 PROCEDURE — 25010000002 KETOROLAC TROMETHAMINE PER 15 MG: Performed by: STUDENT IN AN ORGANIZED HEALTH CARE EDUCATION/TRAINING PROGRAM

## 2023-06-07 PROCEDURE — 96374 THER/PROPH/DIAG INJ IV PUSH: CPT

## 2023-06-07 PROCEDURE — 86140 C-REACTIVE PROTEIN: CPT | Performed by: PHYSICIAN ASSISTANT

## 2023-06-07 PROCEDURE — 85025 COMPLETE CBC W/AUTO DIFF WBC: CPT | Performed by: PHYSICIAN ASSISTANT

## 2023-06-07 PROCEDURE — 80053 COMPREHEN METABOLIC PANEL: CPT | Performed by: PHYSICIAN ASSISTANT

## 2023-06-07 PROCEDURE — 25010000002 HYDROMORPHONE PER 4 MG: Performed by: STUDENT IN AN ORGANIZED HEALTH CARE EDUCATION/TRAINING PROGRAM

## 2023-06-07 PROCEDURE — 74176 CT ABD & PELVIS W/O CONTRAST: CPT

## 2023-06-07 PROCEDURE — 25010000002 ONDANSETRON PER 1 MG: Performed by: STUDENT IN AN ORGANIZED HEALTH CARE EDUCATION/TRAINING PROGRAM

## 2023-06-07 PROCEDURE — 81001 URINALYSIS AUTO W/SCOPE: CPT | Performed by: PHYSICIAN ASSISTANT

## 2023-06-07 PROCEDURE — 96375 TX/PRO/DX INJ NEW DRUG ADDON: CPT

## 2023-06-07 PROCEDURE — 99283 EMERGENCY DEPT VISIT LOW MDM: CPT

## 2023-06-07 RX ORDER — ONDANSETRON 2 MG/ML
4 INJECTION INTRAMUSCULAR; INTRAVENOUS ONCE
Status: COMPLETED | OUTPATIENT
Start: 2023-06-07 | End: 2023-06-07

## 2023-06-07 RX ORDER — HYDROMORPHONE HYDROCHLORIDE 1 MG/ML
0.5 INJECTION, SOLUTION INTRAMUSCULAR; INTRAVENOUS; SUBCUTANEOUS ONCE
Status: COMPLETED | OUTPATIENT
Start: 2023-06-07 | End: 2023-06-07

## 2023-06-07 RX ORDER — KETOROLAC TROMETHAMINE 30 MG/ML
30 INJECTION, SOLUTION INTRAMUSCULAR; INTRAVENOUS ONCE
Status: COMPLETED | OUTPATIENT
Start: 2023-06-07 | End: 2023-06-07

## 2023-06-07 RX ORDER — SODIUM CHLORIDE 0.9 % (FLUSH) 0.9 %
10 SYRINGE (ML) INJECTION AS NEEDED
Status: DISCONTINUED | OUTPATIENT
Start: 2023-06-07 | End: 2023-06-07 | Stop reason: HOSPADM

## 2023-06-07 RX ADMIN — SODIUM CHLORIDE 1000 ML: 9 INJECTION, SOLUTION INTRAVENOUS at 17:12

## 2023-06-07 RX ADMIN — ONDANSETRON 4 MG: 2 INJECTION INTRAMUSCULAR; INTRAVENOUS at 17:14

## 2023-06-07 RX ADMIN — KETOROLAC TROMETHAMINE 30 MG: 30 INJECTION, SOLUTION INTRAMUSCULAR; INTRAVENOUS at 17:14

## 2023-06-07 RX ADMIN — HYDROMORPHONE HYDROCHLORIDE 0.5 MG: 1 INJECTION, SOLUTION INTRAMUSCULAR; INTRAVENOUS; SUBCUTANEOUS at 17:15

## 2023-06-07 NOTE — Clinical Note
[facility] EMERGENCY DEPARTMENT  [address] [address]  Phone: [phone]    Yahir Garsia was seen and treated in our emergency department on 6/7/2023.  She may return to work on 06/09/2023.         Thank you for choosing [facility].    Angela Barahona PA-C

## 2023-06-07 NOTE — ED PROVIDER NOTES
Subjective   History of Present Illness  42-year-old female with past medical history of nephrolithiasis, sleep apnea, prediabetes, migraines, hypertension, hyperlipidemia, GERD, fatigue, endometriosis, anxiety, and depression presents to the emergency room with left-sided flank pain that radiates to left lower quadrant and left groin x1 day.  Patient does endorse hematuria and dysuria.  She states the pain she is feeling this day is consistent with her prior active kidney stones.  She does state that she has followed with Dr. Alvarez in the past and had to have surgery to remove the kidney stones.  She denies any fever, chills, or body aches.  Aggravating factors include urination and movement.  Denies any alleviating factors despite rest.  Denies any other complaints or concerns at this time.    History provided by:  Patient   used: No      Review of Systems   Constitutional: Negative.  Negative for fever.   HENT: Negative.     Respiratory: Negative.     Cardiovascular: Negative.  Negative for chest pain.   Gastrointestinal: Negative.  Negative for abdominal pain.   Endocrine: Negative.    Genitourinary:  Positive for flank pain. Negative for dysuria.   Skin: Negative.    Neurological: Negative.    Psychiatric/Behavioral: Negative.     All other systems reviewed and are negative.    Past Medical History:   Diagnosis Date    Anxiety     Chronic laryngitis     HISTORY OF    Depression     Endometriosis     Fatigue     GERD (gastroesophageal reflux disease)     full history in HPI.  SEVERE, on H2 blocker only due ot insurance not covering PPI.  Loses employment opportunities due to hoarseness from reflux.     Hyperlipidemia     Hypertension     Kidney stones     Migraine     Prediabetes     HISTORY OF    Sleep apnea     does not use CPAP due to intolerance    Spinal headache        No Known Allergies    Past Surgical History:   Procedure Laterality Date    ABDOMINAL SURGERY      APPENDECTOMY N/A  2018    Procedure: APPENDECTOMY LAPAROSCOPIC;  Surgeon: Augustina Ayala MD;  Location:  [facility];  Service: General    NAILS PROCEDURE N/A 2017    Procedure: ESOPHAGOGASTRODUODENOSCOPY AND NAILS;  Surgeon: Morris Solares MD;  Location:  [facility];  Service:     BRAVO PROCEDURE N/A 6/10/2019    Procedure: ESOPHAGOGASTRODUODENOSCOPY AND BRAVO; GE Junction at 36cm;  Surgeon: Morris Solares MD;  Location:  [facility];  Service: Gastroenterology    CARDIOVASCULAR STRESS TEST  2017     SECTION PRIMARY      done under general anesthesia b/c couldn't get epidural     SECTION WITH TUBAL      CYSTOSCOPY N/A 2018    Procedure: CYSTOSCOPY;  Surgeon: Ousmane Alvarez MD;  Location:  [facility];  Service: Urology    DIAGNOSTIC LAPAROSCOPY      looking for endometriosis    DILATATION AND CURETTAGE N/A 10/4/2016    Procedure: DILATATION AND CURETTAGE WITH FROZEN SECTION;  Surgeon: Rajendra Rico MD;  Location:  [facility];  for abnormal uterine bleeding    ECHO - CONVERTED  2017    ENDOMETRIAL FULGURATION      ENDOSCOPY  2017    in Dr. Beck Gongora    EXTRACORPOREAL SHOCK WAVE LITHOTRIPSY (ESWL) Left 2018    Procedure: EXTRACORPOREAL SHOCKWAVE LITHOTRIPSY;  Surgeon: Ousmane Alvarez MD;  Location:  [facility];  Service: Urology    HIATAL HERNIA REPAIR N/A 2018    Procedure: HIATAL HERNIA REPAIR LAPAROSCOPIC;  Surgeon: Morris Solares MD;  Location:  [facility];  Service:     NISSEN FUNDOPLICATION N/A 2018    Procedure: NISSEN FUNDOPLICATION LAPAROSCOPIC ;  Surgeon: Morris Solares MD;  Location:  [facility];  Service:     TOTAL LAPAROSCOPIC HYSTERECTOMY N/A 10/4/2016    Procedure: TOTAL LAPAROSCOPIC HYSTERECTOMY BILATERAL SALPINGO OOPHERECTOMY WITH DAVINCI SI ROBOT;  Surgeon: Rajendra Rico MD;  Location:  [facility];  endometriosis/fibroids    TUBAL ABDOMINAL LIGATION  2006       Family History   Problem Relation Age of Onset    No Known Problems Mother     No  Known Problems Father     Obesity Sister     Hypertension Sister        Social History     Socioeconomic History    Marital status:      Spouse name: Heath Garsia    Number of children: 2    Years of education: high School [employer]    Tobacco Use    Smoking status: Some Days     Packs/day: 0.50     Years: 12.00     Pack years: 6.00     Types: Cigarettes    Smokeless tobacco: Never   Substance and Sexual Activity    Alcohol use: Yes     Comment: occasional    Drug use: No    Sexual activity: Defer     Partners: Male     Birth control/protection: Surgical     Comment:             Objective   Physical Exam  Vitals and nursing note reviewed.   Constitutional:       General: She is not in acute distress.     Appearance: She is well-developed. She is not diaphoretic.   HENT:      Head: Normocephalic and atraumatic.      Right Ear: External ear normal.      Left Ear: External ear normal.      Nose: Nose normal.   Eyes:      Conjunctiva/sclera: Conjunctivae normal.      Pupils: Pupils are equal, round, and reactive to light.   Neck:      Vascular: No JVD.      Trachea: No tracheal deviation.   Cardiovascular:      Rate and Rhythm: Normal rate and regular rhythm.      Heart sounds: Normal heart sounds. No murmur heard.  Pulmonary:      Effort: Pulmonary effort is normal. No respiratory distress.      Breath sounds: Normal breath sounds. No wheezing.   Abdominal:      General: Bowel sounds are normal. There is no distension.      Palpations: Abdomen is soft.      Tenderness: There is no abdominal tenderness in the left lower quadrant. There is left CVA tenderness.   Musculoskeletal:         General: No deformity. Normal range of motion.      Cervical back: Normal range of motion and neck supple.   Skin:     General: Skin is warm and dry.      Coloration: Skin is not pale.      Findings: No erythema or rash.   Neurological:      Mental Status: She is alert and oriented to person, place, and time.      Cranial  Nerves: No cranial nerve deficit.   Psychiatric:         Behavior: Behavior normal.         Thought Content: Thought content normal.       Procedures           ED Course  ED Course as of 06/07/23 2223   Wed Jun 07, 2023   1755 CT Abdomen Pelvis Stone Protocol [TK]      ED Course User Index  [TK] Angela Barahona PA-C           Results for orders placed or performed during the hospital encounter of 06/07/23   Comprehensive Metabolic Panel    Specimen: Blood   Result Value Ref Range    Glucose 108 (H) 65 - 99 mg/dL    BUN 12 6 - 20 mg/dL    Creatinine 0.99 0.57 - 1.00 mg/dL    Sodium 143 136 - 145 mmol/L    Potassium 4.3 3.5 - 5.2 mmol/L    Chloride 107 98 - 107 mmol/L    CO2 24.3 22.0 - 29.0 mmol/L    Calcium 9.0 8.6 - 10.5 mg/dL    Total Protein 7.2 6.0 - 8.5 g/dL    Albumin 4.2 3.5 - 5.2 g/dL    ALT (SGPT) 17 1 - 33 U/L    AST (SGOT) 20 1 - 32 U/L    Alkaline Phosphatase 118 (H) 39 - 117 U/L    Total Bilirubin 0.4 0.0 - 1.2 mg/dL    Globulin 3.0 gm/dL    A/G Ratio 1.4 g/dL    BUN/Creatinine Ratio 12.1 7.0 - 25.0    Anion Gap 11.7 5.0 - 15.0 mmol/L    eGFR 73.2 >60.0 mL/min/1.73   C-reactive Protein    Specimen: Blood   Result Value Ref Range    C-Reactive Protein 1.71 (H) 0.00 - 0.50 mg/dL   Urinalysis With Culture If Indicated - Urine, Clean Catch    Specimen: Urine, Clean Catch   Result Value Ref Range    Color, UA Yellow Yellow, Straw    Appearance, UA Clear Clear    pH, UA 6.5 5.0 - 8.0    Specific Gravity, UA 1.009 1.005 - 1.030    Glucose, UA Negative Negative    Ketones, UA Negative Negative    Bilirubin, UA Negative Negative    Blood, UA Large (3+) (A) Negative    Protein, UA Negative Negative    Leuk Esterase, UA Negative Negative    Nitrite, UA Negative Negative    Urobilinogen, UA 0.2 E.U./dL 0.2 - 1.0 E.U./dL   CBC Auto Differential    Specimen: Blood   Result Value Ref Range    WBC 8.87 3.40 - 10.80 10*3/mm3    RBC 4.56 3.77 - 5.28 10*6/mm3    Hemoglobin 13.9 12.0 - 15.9 g/dL    Hematocrit 43.8  34.0 - 46.6 %    MCV 96.1 79.0 - 97.0 fL    MCH 30.5 26.6 - 33.0 pg    MCHC 31.7 31.5 - 35.7 g/dL    RDW 13.4 12.3 - 15.4 %    RDW-SD 48.0 37.0 - 54.0 fl    MPV 9.5 6.0 - 12.0 fL    Platelets 293 140 - 450 10*3/mm3    Neutrophil % 65.2 42.7 - 76.0 %    Lymphocyte % 24.6 19.6 - 45.3 %    Monocyte % 6.7 5.0 - 12.0 %    Eosinophil % 2.1 0.3 - 6.2 %    Basophil % 0.9 0.0 - 1.5 %    Immature Grans % 0.5 0.0 - 0.5 %    Neutrophils, Absolute 5.79 1.70 - 7.00 10*3/mm3    Lymphocytes, Absolute 2.18 0.70 - 3.10 10*3/mm3    Monocytes, Absolute 0.59 0.10 - 0.90 10*3/mm3    Eosinophils, Absolute 0.19 0.00 - 0.40 10*3/mm3    Basophils, Absolute 0.08 0.00 - 0.20 10*3/mm3    Immature Grans, Absolute 0.04 0.00 - 0.05 10*3/mm3    nRBC 0.0 0.0 - 0.2 /100 WBC   Urinalysis, Microscopic Only - Urine, Clean Catch    Specimen: Urine, Clean Catch   Result Value Ref Range    RBC, UA Too Numerous to Count (A) None Seen, 0-2 /HPF    WBC, UA 0-2 None Seen, 0-2 /HPF    Bacteria, UA None Seen None Seen /HPF    Squamous Epithelial Cells, UA 0-2 None Seen, 0-2 /HPF    Hyaline Casts, UA None Seen None Seen /LPF    Methodology Automated Microscopy    Green Top (Gel)   Result Value Ref Range    Extra Tube Hold for add-ons.    Lavender Top   Result Value Ref Range    Extra Tube hold for add-on    Gold Top - SST   Result Value Ref Range    Extra Tube Hold for add-ons.    Light Blue Top   Result Value Ref Range    Extra Tube Hold for add-ons.        CT Abdomen Pelvis Stone Protocol   Final Result       No acute process in the abdomen/pelvis.       This report was finalized on 6/7/2023 5:21 PM by Sonya Andre MD.                                              Medical Decision Making  42-year-old female with past medical history of nephrolithiasis, sleep apnea, prediabetes, migraines, hypertension, hyperlipidemia, GERD, fatigue, endometriosis, anxiety, and depression presents to the emergency room with left-sided flank pain that radiates to left lower  quadrant and left groin x1 day.  Patient does endorse hematuria and dysuria.  She states the pain she is feeling this day is consistent with her prior active kidney stones.  She does state that she has followed with Dr. Alvarez in the past and had to have surgery to remove the kidney stones.  She denies any fever, chills, or body aches.  Aggravating factors include urination and movement.  Denies any alleviating factors despite rest.  Denies any other complaints or concerns at this time.      Problems Addressed:  Flank pain, acute: acute illness or injury  Hematuria, unspecified type: acute illness or injury    Amount and/or Complexity of Data Reviewed  Labs: ordered. Decision-making details documented in ED Course.  Radiology: ordered. Decision-making details documented in ED Course.    Risk  Prescription drug management.        Final diagnoses:   Hematuria, unspecified type   Flank pain, acute       ED Disposition  ED Disposition       ED Disposition   Discharge    Condition   Stable    Comment   --               Ousmane Alvarez MD  [address] [facility] [address]  [place] [id number]  [phone]    In 2 days           Medication List      No changes were made to your prescriptions during this visit.            Angela Barahona PADudleyC  06/07/23 0853

## 2023-06-16 PROBLEM — R10.9 BILATERAL FLANK PAIN: Status: ACTIVE | Noted: 2023-06-16

## 2023-07-26 ENCOUNTER — PRE-ADMISSION TESTING (OUTPATIENT)
Dept: PREADMISSION TESTING | Facility: HOSPITAL | Age: 43
End: 2023-07-26
Payer: COMMERCIAL

## 2023-07-26 LAB
ANION GAP SERPL CALCULATED.3IONS-SCNC: 8.9 MMOL/L (ref 5–15)
BUN SERPL-MCNC: 18 MG/DL (ref 6–20)
BUN/CREAT SERPL: 16.2 (ref 7–25)
CALCIUM SPEC-SCNC: 9.5 MG/DL (ref 8.6–10.5)
CHLORIDE SERPL-SCNC: 107 MMOL/L (ref 98–107)
CO2 SERPL-SCNC: 23.1 MMOL/L (ref 22–29)
CREAT SERPL-MCNC: 1.11 MG/DL (ref 0.57–1)
DEPRECATED RDW RBC AUTO: 46.6 FL (ref 37–54)
EGFRCR SERPLBLD CKD-EPI 2021: 63.8 ML/MIN/1.73
ERYTHROCYTE [DISTWIDTH] IN BLOOD BY AUTOMATED COUNT: 13.2 % (ref 12.3–15.4)
GLUCOSE SERPL-MCNC: 96 MG/DL (ref 65–99)
HCT VFR BLD AUTO: 41.4 % (ref 34–46.6)
HGB BLD-MCNC: 13.4 G/DL (ref 12–15.9)
MCH RBC QN AUTO: 30.7 PG (ref 26.6–33)
MCHC RBC AUTO-ENTMCNC: 32.4 G/DL (ref 31.5–35.7)
MCV RBC AUTO: 95 FL (ref 79–97)
PLATELET # BLD AUTO: 310 10*3/MM3 (ref 140–450)
PMV BLD AUTO: 9.7 FL (ref 6–12)
POTASSIUM SERPL-SCNC: 4.5 MMOL/L (ref 3.5–5.2)
RBC # BLD AUTO: 4.36 10*6/MM3 (ref 3.77–5.28)
SODIUM SERPL-SCNC: 139 MMOL/L (ref 136–145)
WBC NRBC COR # BLD: 9.52 10*3/MM3 (ref 3.4–10.8)

## 2023-07-26 PROCEDURE — 36415 COLL VENOUS BLD VENIPUNCTURE: CPT

## 2023-07-26 PROCEDURE — 85027 COMPLETE CBC AUTOMATED: CPT

## 2023-07-26 PROCEDURE — 80048 BASIC METABOLIC PNL TOTAL CA: CPT

## 2023-07-26 NOTE — DISCHARGE INSTRUCTIONS

## 2023-07-28 ENCOUNTER — HOSPITAL ENCOUNTER (OUTPATIENT)
Facility: HOSPITAL | Age: 43
Setting detail: HOSPITAL OUTPATIENT SURGERY
Discharge: HOME | End: 2023-07-28
Attending: UROLOGY | Admitting: UROLOGY
Payer: COMMERCIAL

## 2023-07-28 ENCOUNTER — ANESTHESIA (OUTPATIENT)
Dept: PERIOP | Facility: HOSPITAL | Age: 43
End: 2023-07-28
Payer: COMMERCIAL

## 2023-07-28 ENCOUNTER — ANESTHESIA EVENT (OUTPATIENT)
Dept: PERIOP | Facility: HOSPITAL | Age: 43
End: 2023-07-28
Payer: COMMERCIAL

## 2023-07-28 VITALS
TEMPERATURE: 98.2 F | BODY MASS INDEX: 44.48 KG/M2 | HEART RATE: 70 BPM | SYSTOLIC BLOOD PRESSURE: 122 MMHG | RESPIRATION RATE: 16 BRPM | OXYGEN SATURATION: 99 % | WEIGHT: 235.6 LBS | HEIGHT: 61 IN | DIASTOLIC BLOOD PRESSURE: 87 MMHG

## 2023-07-28 DIAGNOSIS — R10.9 BILATERAL FLANK PAIN: ICD-10-CM

## 2023-07-28 DIAGNOSIS — N20.0 KIDNEY STONES: ICD-10-CM

## 2023-07-28 DIAGNOSIS — N30.00 ACUTE CYSTITIS WITHOUT HEMATURIA: ICD-10-CM

## 2023-07-28 DIAGNOSIS — N20.0 BILATERAL NEPHROLITHIASIS: ICD-10-CM

## 2023-07-28 PROCEDURE — 50590 FRAGMENTING OF KIDNEY STONE: CPT | Performed by: UROLOGY

## 2023-07-28 PROCEDURE — 25010000002 KETOROLAC TROMETHAMINE PER 15 MG: Performed by: NURSE ANESTHETIST, CERTIFIED REGISTERED

## 2023-07-28 PROCEDURE — 0 MEPERIDINE PER 100 MG: Performed by: NURSE ANESTHETIST, CERTIFIED REGISTERED

## 2023-07-28 PROCEDURE — 25010000002 ONDANSETRON PER 1 MG: Performed by: NURSE ANESTHETIST, CERTIFIED REGISTERED

## 2023-07-28 PROCEDURE — 25010000002 FENTANYL CITRATE (PF) 50 MCG/ML SOLUTION: Performed by: NURSE ANESTHETIST, CERTIFIED REGISTERED

## 2023-07-28 PROCEDURE — 25010000002 PROPOFOL 10 MG/ML EMULSION: Performed by: NURSE ANESTHETIST, CERTIFIED REGISTERED

## 2023-07-28 PROCEDURE — S0260 H&P FOR SURGERY: HCPCS | Performed by: UROLOGY

## 2023-07-28 PROCEDURE — 25010000002 GENTAMICIN PER 80 MG: Performed by: NURSE PRACTITIONER

## 2023-07-28 PROCEDURE — 25010000002 MIDAZOLAM PER 1 MG: Performed by: NURSE ANESTHETIST, CERTIFIED REGISTERED

## 2023-07-28 RX ORDER — SODIUM CHLORIDE, SODIUM LACTATE, POTASSIUM CHLORIDE, CALCIUM CHLORIDE 600; 310; 30; 20 MG/100ML; MG/100ML; MG/100ML; MG/100ML
INJECTION, SOLUTION INTRAVENOUS CONTINUOUS PRN
Status: DISCONTINUED | OUTPATIENT
Start: 2023-07-28 | End: 2023-07-28 | Stop reason: SURG

## 2023-07-28 RX ORDER — FENTANYL CITRATE 50 UG/ML
INJECTION, SOLUTION INTRAMUSCULAR; INTRAVENOUS AS NEEDED
Status: DISCONTINUED | OUTPATIENT
Start: 2023-07-28 | End: 2023-07-28 | Stop reason: SURG

## 2023-07-28 RX ORDER — MIDAZOLAM HYDROCHLORIDE 1 MG/ML
INJECTION INTRAMUSCULAR; INTRAVENOUS AS NEEDED
Status: DISCONTINUED | OUTPATIENT
Start: 2023-07-28 | End: 2023-07-28 | Stop reason: SURG

## 2023-07-28 RX ORDER — ONDANSETRON 2 MG/ML
4 INJECTION INTRAMUSCULAR; INTRAVENOUS AS NEEDED
Status: DISCONTINUED | OUTPATIENT
Start: 2023-07-28 | End: 2023-07-28 | Stop reason: HOSPADM

## 2023-07-28 RX ORDER — SODIUM CHLORIDE, SODIUM LACTATE, POTASSIUM CHLORIDE, CALCIUM CHLORIDE 600; 310; 30; 20 MG/100ML; MG/100ML; MG/100ML; MG/100ML
100 INJECTION, SOLUTION INTRAVENOUS ONCE AS NEEDED
Status: DISCONTINUED | OUTPATIENT
Start: 2023-07-28 | End: 2023-07-28 | Stop reason: HOSPADM

## 2023-07-28 RX ORDER — OXYCODONE HYDROCHLORIDE AND ACETAMINOPHEN 5; 325 MG/1; MG/1
1 TABLET ORAL ONCE AS NEEDED
Status: COMPLETED | OUTPATIENT
Start: 2023-07-28 | End: 2023-07-28

## 2023-07-28 RX ORDER — PROPOFOL 10 MG/ML
VIAL (ML) INTRAVENOUS AS NEEDED
Status: DISCONTINUED | OUTPATIENT
Start: 2023-07-28 | End: 2023-07-28 | Stop reason: SURG

## 2023-07-28 RX ORDER — IPRATROPIUM BROMIDE AND ALBUTEROL SULFATE 2.5; .5 MG/3ML; MG/3ML
3 SOLUTION RESPIRATORY (INHALATION) ONCE AS NEEDED
Status: DISCONTINUED | OUTPATIENT
Start: 2023-07-28 | End: 2023-07-28 | Stop reason: HOSPADM

## 2023-07-28 RX ORDER — HYDROCODONE BITARTRATE AND ACETAMINOPHEN 10; 325 MG/1; MG/1
1 TABLET ORAL EVERY 6 HOURS PRN
Qty: 12 TABLET | Refills: 0 | Status: SHIPPED | OUTPATIENT
Start: 2023-07-28

## 2023-07-28 RX ORDER — FENTANYL CITRATE 50 UG/ML
50 INJECTION, SOLUTION INTRAMUSCULAR; INTRAVENOUS
Status: DISCONTINUED | OUTPATIENT
Start: 2023-07-28 | End: 2023-07-28 | Stop reason: HOSPADM

## 2023-07-28 RX ORDER — LIDOCAINE HYDROCHLORIDE 20 MG/ML
INJECTION, SOLUTION EPIDURAL; INFILTRATION; INTRACAUDAL; PERINEURAL AS NEEDED
Status: DISCONTINUED | OUTPATIENT
Start: 2023-07-28 | End: 2023-07-28 | Stop reason: SURG

## 2023-07-28 RX ORDER — ONDANSETRON 2 MG/ML
INJECTION INTRAMUSCULAR; INTRAVENOUS AS NEEDED
Status: DISCONTINUED | OUTPATIENT
Start: 2023-07-28 | End: 2023-07-28 | Stop reason: SURG

## 2023-07-28 RX ORDER — MEPERIDINE HYDROCHLORIDE 25 MG/ML
12.5 INJECTION INTRAMUSCULAR; INTRAVENOUS; SUBCUTANEOUS
Status: DISCONTINUED | OUTPATIENT
Start: 2023-07-28 | End: 2023-07-28 | Stop reason: HOSPADM

## 2023-07-28 RX ORDER — KETOROLAC TROMETHAMINE 30 MG/ML
INJECTION, SOLUTION INTRAMUSCULAR; INTRAVENOUS AS NEEDED
Status: DISCONTINUED | OUTPATIENT
Start: 2023-07-28 | End: 2023-07-28 | Stop reason: SURG

## 2023-07-28 RX ORDER — FAMOTIDINE 10 MG/ML
INJECTION, SOLUTION INTRAVENOUS AS NEEDED
Status: DISCONTINUED | OUTPATIENT
Start: 2023-07-28 | End: 2023-07-28 | Stop reason: SURG

## 2023-07-28 RX ORDER — GENTAMICIN SULFATE 80 MG/100ML
80 INJECTION, SOLUTION INTRAVENOUS ONCE
Status: COMPLETED | OUTPATIENT
Start: 2023-07-28 | End: 2023-07-28

## 2023-07-28 RX ADMIN — FAMOTIDINE 20 MG: 10 INJECTION, SOLUTION INTRAVENOUS at 07:27

## 2023-07-28 RX ADMIN — ONDANSETRON 4 MG: 2 INJECTION INTRAMUSCULAR; INTRAVENOUS at 07:27

## 2023-07-28 RX ADMIN — GENTAMICIN SULFATE 80 MG: 80 INJECTION, SOLUTION INTRAVENOUS at 07:36

## 2023-07-28 RX ADMIN — MEPERIDINE HYDROCHLORIDE 12.5 MG: 25 INJECTION INTRAMUSCULAR; INTRAVENOUS; SUBCUTANEOUS at 08:40

## 2023-07-28 RX ADMIN — LIDOCAINE HYDROCHLORIDE 40 MG: 20 INJECTION, SOLUTION EPIDURAL; INFILTRATION; INTRACAUDAL; PERINEURAL at 07:33

## 2023-07-28 RX ADMIN — MIDAZOLAM HYDROCHLORIDE 2 MG: 1 INJECTION, SOLUTION INTRAMUSCULAR; INTRAVENOUS at 07:27

## 2023-07-28 RX ADMIN — SODIUM CHLORIDE, POTASSIUM CHLORIDE, SODIUM LACTATE AND CALCIUM CHLORIDE: 600; 310; 30; 20 INJECTION, SOLUTION INTRAVENOUS at 07:27

## 2023-07-28 RX ADMIN — OXYCODONE HYDROCHLORIDE AND ACETAMINOPHEN 1 TABLET: 5; 325 TABLET ORAL at 08:40

## 2023-07-28 RX ADMIN — MEPERIDINE HYDROCHLORIDE 12.5 MG: 25 INJECTION INTRAMUSCULAR; INTRAVENOUS; SUBCUTANEOUS at 08:45

## 2023-07-28 RX ADMIN — ONDANSETRON 4 MG: 2 INJECTION INTRAMUSCULAR; INTRAVENOUS at 08:40

## 2023-07-28 RX ADMIN — PROPOFOL 150 MG: 10 INJECTION, EMULSION INTRAVENOUS at 07:33

## 2023-07-28 RX ADMIN — KETOROLAC TROMETHAMINE 30 MG: 30 INJECTION, SOLUTION INTRAMUSCULAR; INTRAVENOUS at 07:36

## 2023-07-28 RX ADMIN — FENTANYL CITRATE 100 MCG: 50 INJECTION INTRAMUSCULAR; INTRAVENOUS at 07:57

## 2023-07-28 NOTE — H&P
"     Yahir Garsia presents to [facility] for BILATERAL RENAL STONES/FLANK PAIN  Flank Pain  Associated symptoms include dysuria. Pertinent negatives include no abdominal pain, bladder incontinence, fever, pelvic pain or weight loss.   Mrs. Yahir Garsia  date of birth 1980, she is a pleasant 42-year-old patient who presents to clinic today for reevaluation as a new patient consult. She is also an ER follow-up for kidney stones, presenting on 06/07/2023 secondary to complaints of hematuria, left-sided flank pain radiating into her left lower quadrant and left groin ongoing for 1 day. During her ER evaluation, patient reported episodes of hematuria and dysuria. She reported colicky pain rating at 10 out of 10, which she described as consistent with her prior active kidney stones. She was last seen in clinic in 2018 by Dr. Alvarez for similar concerns. She has been lost to follow up until recently.      She had a CT abdomen and pelvis done which I have reviewed showing bilateral intrarenal nonobstructing stones, largest in the right lower pole measuring only 3 mm. No hydronephrosis or hydroureter noted on CT.      On initial evaluation in clinic today, she is in no apparent discomfort. Urine is completely negative for leukocyte esterase. It is negative for nitrites. It is negative for groSS AND microscopic hematuria. PVR 0 mL. Last kidney stone procedure was actually in 2018 and that was a ureteroscopy and laser lithotripsy.     The patient reports she most likely passed the left kidney stone that was causing her severe pain and discomfort, before she got into the ER. She notes she had hematuria. She was not given any medication for a bladder infection. She has urinary frequency and occasional incontinent episodes, however, She does not wear pads or diapers.      She is feeling \"not too bad\" today. She denies any pain or discomfort. She has the urge to urinate a " "lot. She notes she has frequent bowel movements. She does not eat a lot. She does not drink milk. She has nocturia 3 to 4 times a night. She has 1 water bottle. She gets Botox in her vocal cords. She is not on any blood thinners. She denies any allergies. She rates her pain as 4 or 5 out of 10, and is requesting surgery because the stones get worse she states.     No acute process in the abdomen/pelvis.       Past Medical History:   Diagnosis Date    Anxiety      Chronic laryngitis       HISTORY OF    Depression      Endometriosis      Fatigue      GERD (gastroesophageal reflux disease)       full history in HPI.  SEVERE, on H2 blocker only due ot insurance not covering PPI.  Loses employment opportunities due to hoarseness from reflux.     Hyperlipidemia      Hypertension      Kidney stones      Migraine      Prediabetes       HISTORY OF    Sleep apnea       does not use CPAP due to intolerance    Spinal headache              Objective      Vital Signs:   /98   Pulse 85   Ht 154.9 cm (60.98\")   Wt 108 kg (237 lb 12.8 oz)   BMI 44.96 kg/m²        ROS:   Review of Systems   Constitutional:  Positive for activity change, fatigue and unexpected weight gain. Negative for appetite change, chills, diaphoresis, fever and unexpected weight loss.   HENT: Negative.  Negative for congestion.    Eyes: Negative.  Negative for blurred vision.   Gastrointestinal:  Positive for abdominal distention and constipation. Negative for abdominal pain, diarrhea, nausea and vomiting.   Endocrine: Negative.    Genitourinary:  Positive for dysuria, flank pain and frequency. Negative for decreased urine volume, difficulty urinating, dyspareunia, genital sores, hematuria, pelvic pain, pelvic pressure, urgency, urinary incontinence and vaginal discharge.   Musculoskeletal:  Positive for back pain and myalgias. Negative for arthralgias and joint swelling.   Skin:  Positive for pallor. Negative for rash and wound.   Allergic/Immunologic: " Negative.    Neurological: Negative.  Negative for dizziness.   Hematological: Negative.    Psychiatric/Behavioral:  Positive for sleep disturbance and stress. Negative for behavioral problems and decreased concentration.        Physical Exam  Constitutional:       General: She is in acute distress.      Appearance: She is well-developed. She is obese. She is ill-appearing.   HENT:      Head: Normocephalic and atraumatic.   Eyes:      Pupils: Pupils are equal, round, and reactive to light.   Neck:      Thyroid: No thyromegaly.      Trachea: No tracheal deviation.   Cardiovascular:      Rate and Rhythm: Normal rate and regular rhythm.      Heart sounds: No murmur heard.  Pulmonary:      Effort: Pulmonary effort is normal. No respiratory distress.      Breath sounds: Normal breath sounds. No stridor. No wheezing.   Abdominal:      General: Bowel sounds are normal. There is distension.      Palpations: Abdomen is soft.      Tenderness: There is abdominal tenderness.   Genitourinary:     Labia:         Right: No tenderness.         Left: No tenderness.       Vagina: Normal. No vaginal discharge.      Comments: URINE FRERQUENCY/URGENCY  Musculoskeletal:         General: Tenderness present. No deformity. Normal range of motion.      Cervical back: Normal range of motion.   Skin:     General: Skin is warm and dry.      Capillary Refill: Capillary refill takes less than 2 seconds.      Coloration: Skin is not pale.      Findings: No erythema or rash.   Neurological:      Mental Status: She is alert and oriented to person, place, and time.      Cranial Nerves: No cranial nerve deficit.      Sensory: No sensory deficit.      Motor: Weakness present.      Coordination: Coordination normal.   Psychiatric:         Behavior: Behavior normal.         Thought Content: Thought content normal.         Judgment: Judgment normal.            Result Review    :      Urinalysis Results:   UA            6/7/2023    17:12 6/16/2023    08:49    Urinalysis   Squamous Epithelial Cells, UA 0-2      Specific Gravity, UA 1.009      Ketones, UA Negative  Negative    Blood, UA Large (3+)      Leukocytes, UA Negative  Negative    Nitrite, UA Negative      RBC, UA Too Numerous to Count      WBC, UA 0-2      Bacteria, UA None Seen            Urine Culture Results:                  Assessment and Plan    Problem List Items Addressed This Visit    None  Visit Diagnoses         Bilateral nephrolithiasis    -  Primary     Relevant Medications     ondansetron (Zofran) 4 MG tablet     tamsulosin (FLOMAX) 0.4 MG capsule 24 hr capsule     ketorolac (TORADOL) 10 MG tablet     polyethylene glycol (MiraLax) 17 g packet     Other Relevant Orders     Case Request (Completed)     Bilateral flank pain         Relevant Medications     ondansetron (Zofran) 4 MG tablet     tamsulosin (FLOMAX) 0.4 MG capsule 24 hr capsule     ketorolac (TORADOL) 10 MG tablet     polyethylene glycol (MiraLax) 17 g packet     Other Relevant Orders     Case Request (Completed)     Kidney stones         Relevant Medications     ondansetron (Zofran) 4 MG tablet     tamsulosin (FLOMAX) 0.4 MG capsule 24 hr capsule     ketorolac (TORADOL) 10 MG tablet     polyethylene glycol (MiraLax) 17 g packet     ketorolac (TORADOL) injection 30 mg     Other Relevant Orders     POC Urinalysis Dipstick, Automated (Completed)     Case Request (Completed)     Acute cystitis without hematuria         Relevant Medications     tamsulosin (FLOMAX) 0.4 MG capsule 24 hr capsule     Mirabegron ER (Myrbetriq) 50 MG tablet sustained-release 24 hour 24 hr tablet     Slow transit constipation         Relevant Medications     polyethylene glycol (MiraLax) 17 g packet     Detrusor instability         Relevant Medications     tamsulosin (FLOMAX) 0.4 MG capsule 24 hr capsule     Mirabegron ER (Myrbetriq) 50 MG tablet sustained-release 24 hour 24 hr tablet                                                            ASSESSMENT      BILATERAL Nephrolithiasis/ R>L FLANK PAIN /IBS -Constipation/OAB-DI  MS. PATRICIA PONCE IS A 42-year-old very pleasant patient, who presents to clinic today for evaluation. PATIENT  Presents to clinic with RIGHT>LEFT flank pain that has been ongoing for about 3 weeks, worsening the last 5 days, causing  significant nausea and vomiting, stomach pain and discomfort.  She also reports urinary symptoms consistent with overactive bladder.  She does not have recurrent UTIs.  Her urine dipstick today is completely negative,  PVR 0     FIRST, We discussed the presence of the current tiny bilateral nonobstructing stones. We have discussed the various parameters regarding spontaneous passage including the notion that a tiny 1-4 mm stone has a 95% high likelihood of spontaneous passage versus a larger stone  being caught up in the upper areas of the urinary tract. We also discussed the medical management of stone disease and the use of medical expulsive therapy in the form of Flomax. This is used in an off label setting. I also talked about nonoperative management including ambulation and increasing fluids and hot tub as being an effective adjuncts in the treatment of a stone.We discussed the absolute relative indicators for intervention including the presence of sepsis, and pain we cannot control as the primary need for urgent intervention.  We discussed placement of a stent if indicated and the management of the stent as well.      GERD/IBS- Patient has significant irritable bowel syndrome, characterized by extreme amounts of constipation.  We spoke about the impact of this on bladder function.  We spoke about  its relationship to recurrent urinary tract infections. We discussed the need to increase p.o. fluid intake to at least 2 to 3 L of water daily, and discussed the physiology of colonic motility as well as use of MiraLAX as a bulk laxative versus the newer class of serotonin uptake blockers such as Linzess.  We stressed  the need for a daily bowel movement and discussed the [place] stool scale at length. We also discussed a follow-up with her  GI nurse practitioner, she reports she had colonoscopy postponed.     Again, we discussed OAB/Detrusor Instability which is an  irritative bladder symptomatology most likely related to factors such as intake of bladder irritants, postinfectious irritation, prolapse, with a very large differential diagnosis.  The mainstay of treatment has been tight cholinergics which basically caused the bladder to have decreased contractility.  We have discussed the side effects of these treatments including dry mouth, double vision, and increasing constipation.  She reports doing well  on oxybutynin for symptomatic relief.     Anticholinergic medication:  I talked about the various therapeutic options including anticholinergics, beta 3 agonists, and alpha blockade if there is a component of obstruction. I discussed the side effects of anti-cholinergic including dry mouth, double vision. HOWEVER, we are recommending the use of an anticholinergic. AGAIN, We discussed the class of drugs.  We discussed the older drug such as oxybutynin with his increased spectrum of side effects such as dry mouth, dry eyes constipation versus the newer medications with lower side effects but more difficult to obtain via insurance and much more expensive finally the newest class of drugs which is Myrbetriq which has a very favorable side effect profile but unfortunately is prohibitively expensive and oftentimes requires precertification of which may be unsuccessful in many other cases      ESWL-FINALLY, the patient is a candidate for extracorporeal shockwave lithotripsy.  We discussed the type of stone.  And the complications associated with the procedure including but not limited to pain in the flank, hematoma, spontaneous renal hemorrhage, and adequate fragmentation of stones.  The need for passage of the stones.  The need for  concomitant additional procedures in the range of 24% the risk of a distal fragment in the range of 3% requiring ureteroscopy.                                                               PLAN  THE PATIENT HAS BEEN SCHEDULED FOR RIGHT EXTRACORPOREAL SHOCKWAVE LITHOTRIPSY- ESWL ON 07/28/23 WITH DR. SASHA PEARSONADOL 30 MG IM X 1 DOSE GIVEN IN CLINIC TODAY     MYRBETRIQ 50 MG  SAMPLES GIVEN FOR TRIAL -OAB/DI     Sent her Urine for culture, call results if any positive bacterial growth       We discussed OTHER  treatment options for her flank pain with patient encouraged to continue conservative therapy alternating NSAID/Tylenol as tolerated, Also including hot baths, showers, warm compresses to lower back as tolerated. Also encouraged walking, physical therapy, light exercises as tolerated     Rest is the most important treatment in the case of flank pain. Rest and physical therapy are enough to improve minor pain. Discussed to monitor her daily routine with prevention of flank pain by: At least Drinking 8 glasses of water per day, Limiting your alcohol consumption.  Having a healthy diet containing fruits, vegetables, and a lot of fluids, Practicing safe sex.  Also maintaining proper hygiene of your body as well as the environment.     Discussed a kidney stone prevention diet to include increasing p.o. fluid intake, to at least 1 to 2 L of water daily.  She is to avoid caffeine products such as cola, coffee, and to avoid soft or soda drinks.  She is to decrease her sodium consumption as in  Fast foods, mcgee, salted nuts, canned foods, and smoked/cured foods.      She is also to decrease her oxalate consumption, as in spinach, Jorge Alberto greens, and Rhubarb.  Also important is to decrease protein intake, as in red meats, peanut butter, and also avoid nuts.     Continue Flomax daily as prescribed, Continue all other medications     Strict bowel regiment due to her issues with diarrhea/severe constipation     Follow-up  in clinic as discussed-EVAL MEDS FOR OAB/ POST PROCEDURE,      May return sooner if need be,     OR GO TO ER IF WORSENING SYMPTOMS     Patient agreeable plan of care     Patient reports that she is not currently experiencing any symptoms of urinary incontinence.     Class 3 Severe Obesity (BMI >=40). Obesity-related health conditions include the following: hypertension, coronary heart disease, dyslipidemias and GERD. Obesity is improving with lifestyle modifications. BMI is is above average; no BMI management plan is appropriate. We discussed portion control and increasing exercise.     RADIOLOGY (CT AND/OR KUB):    CT Abdomen and Pelvis: No results found for this or any previous visit.     CT Stone Protocol: Results for orders placed during the hospital encounter of 06/07/23     CT Abdomen Pelvis Stone Protocol     Narrative  Procedure: Routine axial CT images of abdomen and pelvis acquired  without IV contrast. Additional 3 mm thick coronal and sagittal  reformats acquired. CT scan performed according to ALARA(as low as  reasonably achievable)dose protocol.     Comparison: None available     Findings:     Bilateral intrarenal non-obstructing stones. Largest in the right lower  pole measuring 3 mm.  No hydronephrosis is seen.  There are no focal inflammatory changes identified.  No free air or fluid seen.  No dilated small or large bowel.  No grossly thickened bowel loops  Appendix is not seen.  Visualized lung bases are negative.     Impression  No acute process in the abdomen/pelvis.     This report was finalized on 6/7/2023 5:21 PM by Sonya Andre MD.     KUB: Results for orders placed in visit on 12/26/19     XR Abdomen KUB     Narrative  XR ABDOMEN KUB-     HISTORY:  Kidney stones; N20.0-Calculus of kidney     Today's KUB is compared with a previous study from 2018. The bowel gas  pattern in the abdomen showed nothing to suggest obstruction. There were  no soft tissue masses or pathological  calcifications.     Impression  Nonspecific supine view of the abdomen.     This report was finalized on 12/26/2019 1:59 PM by Dr. Rob Brush II, MD.        LABS (3 MONTHS):            Office Visit on 06/16/2023   Component Date Value Ref Range Status    Color 06/16/2023 Yellow  Yellow, Straw, Dark Yellow, Marianela Final    Clarity, UA 06/16/2023 Clear  Clear Final    Specific Gravity  06/16/2023 1.010  1.005 - 1.030 Final    pH, Urine 06/16/2023 6.5  5.0 - 8.0 Final    Leukocytes 06/16/2023 Negative  Negative Final    Nitrite, UA 06/16/2023 Negative  Negative Final    Protein, POC 06/16/2023 Negative  Negative mg/dL Final    Glucose, UA 06/16/2023 Negative  Negative mg/dL Final    Ketones, UA 06/16/2023 Negative  Negative Final    Urobilinogen, UA 06/16/2023 Normal  Normal, 0.2 E.U./dL Final    Bilirubin 06/16/2023 Negative  Negative Final    Blood, UA 06/16/2023 Negative  Negative Final    Lot Number 06/16/2023 [id number]    Final    Expiration Date 06/16/2023 10/25/2024    Final   Admission on 06/07/2023, Discharged on 06/07/2023   Component Date Value Ref Range Status    Glucose 06/07/2023 108 (H)  65 - 99 mg/dL Final    BUN 06/07/2023 12  6 - 20 mg/dL Final    Creatinine 06/07/2023 0.99  0.57 - 1.00 mg/dL Final    Sodium 06/07/2023 143  136 - 145 mmol/L Final    Potassium 06/07/2023 4.3  3.5 - 5.2 mmol/L Final     Slight hemolysis detected by analyzer. Results may be affected.    Chloride 06/07/2023 107  98 - 107 mmol/L Final    CO2 06/07/2023 24.3  22.0 - 29.0 mmol/L Final    Calcium 06/07/2023 9.0  8.6 - 10.5 mg/dL Final    Total Protein 06/07/2023 7.2  6.0 - 8.5 g/dL Final    Albumin 06/07/2023 4.2  3.5 - 5.2 g/dL Final    ALT (SGPT) 06/07/2023 17  1 - 33 U/L Final    AST (SGOT) 06/07/2023 20  1 - 32 U/L Final    Alkaline Phosphatase 06/07/2023 118 (H)  39 - 117 U/L Final    Total Bilirubin 06/07/2023 0.4  0.0 - 1.2 mg/dL Final    Globulin 06/07/2023 3.0  gm/dL Final    A/G Ratio 06/07/2023 1.4  g/dL  Final    BUN/Creatinine Ratio 06/07/2023 12.1  7.0 - 25.0 Final    Anion Gap 06/07/2023 11.7  5.0 - 15.0 mmol/L Final    eGFR 06/07/2023 73.2  >60.0 mL/min/1.73 Final    C-Reactive Protein 06/07/2023 1.71 (H)  0.00 - 0.50 mg/dL Final    Color, UA 06/07/2023 Yellow  Yellow, Straw Final    Appearance, UA 06/07/2023 Clear  Clear Final    pH, UA 06/07/2023 6.5  5.0 - 8.0 Final    Specific Gravity, UA 06/07/2023 1.009  1.005 - 1.030 Final    Glucose, UA 06/07/2023 Negative  Negative Final    Ketones, UA 06/07/2023 Negative  Negative Final    Bilirubin, UA 06/07/2023 Negative  Negative Final    Blood, UA 06/07/2023 Large (3+) (A)  Negative Final    Protein, UA 06/07/2023 Negative  Negative Final    Leuk Esterase, UA 06/07/2023 Negative  Negative Final    Nitrite, UA 06/07/2023 Negative  Negative Final    Urobilinogen, UA 06/07/2023 0.2 E.U./dL  0.2 - 1.0 E.U./dL Final    Extra Tube 06/07/2023 Hold for add-ons.    Final     Auto resulted.    Extra Tube 06/07/2023 hold for add-on    Final     Auto resulted    Extra Tube 06/07/2023 Hold for add-ons.    Final     Auto resulted.    Extra Tube 06/07/2023 Hold for add-ons.    Final     Auto resulted    WBC 06/07/2023 8.87  3.40 - 10.80 10*3/mm3 Final    RBC 06/07/2023 4.56  3.77 - 5.28 10*6/mm3 Final    Hemoglobin 06/07/2023 13.9  12.0 - 15.9 g/dL Final    Hematocrit 06/07/2023 43.8  34.0 - 46.6 % Final    MCV 06/07/2023 96.1  79.0 - 97.0 fL Final    MCH 06/07/2023 30.5  26.6 - 33.0 pg Final    MCHC 06/07/2023 31.7  31.5 - 35.7 g/dL Final    RDW 06/07/2023 13.4  12.3 - 15.4 % Final    RDW-SD 06/07/2023 48.0  37.0 - 54.0 fl Final    MPV 06/07/2023 9.5  6.0 - 12.0 fL Final    Platelets 06/07/2023 293  140 - 450 10*3/mm3 Final    Neutrophil % 06/07/2023 65.2  42.7 - 76.0 % Final    Lymphocyte % 06/07/2023 24.6  19.6 - 45.3 % Final    Monocyte % 06/07/2023 6.7  5.0 - 12.0 % Final    Eosinophil % 06/07/2023 2.1  0.3 - 6.2 % Final    Basophil % 06/07/2023 0.9  0.0 - 1.5 % Final     Immature Grans % 06/07/2023 0.5  0.0 - 0.5 % Final    Neutrophils, Absolute 06/07/2023 5.79  1.70 - 7.00 10*3/mm3 Final    Lymphocytes, Absolute 06/07/2023 2.18  0.70 - 3.10 10*3/mm3 Final    Monocytes, Absolute 06/07/2023 0.59  0.10 - 0.90 10*3/mm3 Final    Eosinophils, Absolute 06/07/2023 0.19  0.00 - 0.40 10*3/mm3 Final    Basophils, Absolute 06/07/2023 0.08  0.00 - 0.20 10*3/mm3 Final    Immature Grans, Absolute 06/07/2023 0.04  0.00 - 0.05 10*3/mm3 Final    nRBC 06/07/2023 0.0  0.0 - 0.2 /100 WBC Final    RBC, UA 06/07/2023 Too Numerous to Count (A)  None Seen, 0-2 /HPF Final    WBC, UA 06/07/2023 0-2  None Seen, 0-2 /HPF Final     Urine culture not indicated.    Bacteria, UA 06/07/2023 None Seen  None Seen /HPF Final    Squamous Epithelial Cells, UA 06/07/2023 0-2  None Seen, 0-2 /HPF Final    Hyaline Casts, UA 06/07/2023 None Seen  None Seen /LPF Final    Methodology 06/07/2023 Automated Microscopy    Final         Smoking Cessation Counseling:  Current some day smoker. less than 3 minutes spent counseling. Will try to cut down.  I advised patient to quit tobacco use and offered support.  I provided patient with tobacco cessation educational material printed in the patient's After Visit Summary.      Follow Up   Return in about 6 weeks (around 7/28/2023) for Next scheduled follow up, With Dr. Kim NOEL OR FOR RIGHT ESWL/BILATERAL NEPHROLITHIASIS.     Patient was given instructions and counseling regarding her condition or for health maintenance advice. Please see specific information pulled into the AVS if appropriate.            This document has been electronically signed by Griselda Cheng-Akwa, APRN   June 20, 2023 18:01 EDT        Dictated Utilizing Dragon Dictation: Part of this note may be an electronic transcription/translation of spoken language to printed text using the Dragon Dictation System.           Transcribed from ambient dictation for Griselda Cheng-Akwa, APRN by Naila Bundy.  06/16/23    11:03 EDT     Patient or patient representative verbalized consent to the visit recording.  I have personally performed the services described in this document as transcribed by the above individual, and it is both accurate and complete.

## 2023-07-28 NOTE — OP NOTE
EXTRACORPOREAL SHOCKWAVE LITHOTRIPSY  Procedure Note    Yahir Garsia  7/28/2023    Pre-op Diagnosis:   Kidney stones [N20.0]  Bilateral nephrolithiasis [N20.0]  Bilateral flank pain [R10.9]    Post-op Diagnosis:     Post-Op Diagnosis Codes:     * Kidney stones [N20.0]     * Bilateral nephrolithiasis [N20.0]     * Bilateral flank pain [R10.9]    Procedure/CPT® Codes:  42-year-old white female with a painful right renal calculus for lithotripsy.  ESWL-the patient is a candidate for extracorporeal shockwave lithotripsy.  We discussed the type of stone and the complications associated with the procedure including, but not limited to, pain in the flank, hematoma, spontaneous renal hemorrhage, inadequate fragmentation of stones, the need for passage of the stones, the need for concomitant additional procedures in the range of 24%, the risk of a distal fragment in the range of 3% requiring ureteroscopic removal, and the fact that sometimes a stent is indicated based on the size and the density of the stone as determined on the CAT scan.  Additionally, we discussed percutaneous nephrostolithotomy.  Including the mini PERC.  With its attendant risks of anesthesia bleeding infection and the fact that is a invasive procedure with the remote possibility of a nephrectomy.  We also discussed the use of ureteroscopy which is a rigid or flexible instrument placed up into the kidney to break up stones with the laser beam and very likely a postop stent and a high likelihood of additional concomitant procedures.  Following an informed consent, he was brought to the operative suite and underwent induction of general endotracheal anesthetic.  The stone was localized at F2 and a total of 3000 shockwaves was administered without complication.  There was excellent fragmentation  He was awake and alert and returned to recovery room.         Procedure(s):  EXTRACORPOREAL SHOCKWAVE LITHOTRIPSY RIGHT    Surgeon(s):  Ousmane Alvarez  MD AUSTIN    Anesthesia: see anesthesia record    Staff:   Circulator: Aurora Calderon RN  Scrub Person: Kaylin Solares LPN; Lynda Waters    Estimated Blood Loss: none  Urine Voided: * No values recorded between 7/28/2023  7:28 AM and 7/28/2023  8:01 AM *    Specimens:                None      Drains: None     Findings: Good fragmentation    Blood: N/A    Complications: None    Grafts and Implants: None    Ousmane Alvarez MD     Date: 7/28/2023  Time: 08:27 EDT

## 2023-07-28 NOTE — ANESTHESIA POSTPROCEDURE EVALUATION
Patient: Yahir Garsia    Procedure Summary       Date: 07/28/23 Room / Location: [facility] OR  / [facility] OR    Anesthesia Start: 0727 Anesthesia Stop: 0801    Procedure: EXTRACORPOREAL SHOCKWAVE LITHOTRIPSY RIGHT (Right) Diagnosis:       Kidney stones      Bilateral nephrolithiasis      Bilateral flank pain      (Kidney stones [N20.0])      (Bilateral nephrolithiasis [N20.0])      (Bilateral flank pain [R10.9])    Surgeons: Ousmane Alvarez MD Provider: Tu Giron DO    Anesthesia Type: general ASA Status: 3            Anesthesia Type: general    Vitals  Vitals Value Taken Time   /84 07/28/23 0832   Temp 97.6 °F (36.4 °C) 07/28/23 0802   Pulse 74 07/28/23 0832   Resp 13 07/28/23 0832   SpO2 98 % 07/28/23 0832           Post Anesthesia Care and Evaluation    Patient location during evaluation: PHASE II  Patient participation: complete - patient participated  Level of consciousness: awake and alert  Pain score: 1  Pain management: adequate    Airway patency: patent  Anesthetic complications: No anesthetic complications  PONV Status: controlled  Cardiovascular status: acceptable  Respiratory status: acceptable  Hydration status: acceptable

## 2023-07-28 NOTE — ANESTHESIA PREPROCEDURE EVALUATION
Anesthesia Evaluation     Patient summary reviewed and Nursing notes reviewed   history of anesthetic complications:  PONV               Airway   Mallampati: II  TM distance: >3 FB  Neck ROM: full  No difficulty expected  Dental - normal exam     Pulmonary - normal exam   (+) ,shortness of breath, sleep apnea  Cardiovascular - normal exam  Exercise tolerance: good (4-7 METS)    NYHA Classification: II    (+) hypertensionhyperlipidemia      Neuro/Psych  (+) headaches, numbness, psychiatric history  GI/Hepatic/Renal/Endo    (+) obesity, morbid obesity, GERD, renal disease    Musculoskeletal (-) negative ROS    Abdominal  - normal exam    Bowel sounds: normal.   Substance History - negative use     OB/GYN negative ob/gyn ROS         Other - negative ROS                     Anesthesia Plan    ASA 3     general     intravenous induction     Anesthetic plan, risks, benefits, and alternatives have been provided, discussed and informed consent has been obtained with: patient.    Use of blood products discussed with  Consented to blood products.      CODE STATUS:

## 2023-07-28 NOTE — ANESTHESIA PROCEDURE NOTES
Airway  Urgency: elective    Date/Time: 7/28/2023 7:34 AM  Airway not difficult    General Information and Staff    Patient location during procedure: OR  CRNA/CAA: Shari Ray CRNA    Indications and Patient Condition  Indications for airway management: airway protection    Preoxygenated: yes  MILS maintained throughout  Mask difficulty assessment: 0 - not attempted    Final Airway Details  Final airway type: supraglottic airway      Successful airway: unique  Size 4     Number of attempts at approach: 1  Assessment: lips, teeth, and gum same as pre-op

## 2023-08-11 ENCOUNTER — OFFICE VISIT (OUTPATIENT)
Dept: UROLOGY | Facility: CLINIC | Age: 43
End: 2023-08-11
Payer: COMMERCIAL

## 2023-08-11 ENCOUNTER — HOSPITAL ENCOUNTER (OUTPATIENT)
Dept: GENERAL RADIOLOGY | Facility: HOSPITAL | Age: 43
Discharge: HOME | End: 2023-08-11
Admitting: UROLOGY
Payer: COMMERCIAL

## 2023-08-11 VITALS
WEIGHT: 235.4 LBS | BODY MASS INDEX: 44.45 KG/M2 | HEIGHT: 61 IN | DIASTOLIC BLOOD PRESSURE: 86 MMHG | SYSTOLIC BLOOD PRESSURE: 136 MMHG | HEART RATE: 82 BPM

## 2023-08-11 DIAGNOSIS — R10.9 BILATERAL FLANK PAIN: ICD-10-CM

## 2023-08-11 DIAGNOSIS — N20.0 LEFT RENAL STONE: ICD-10-CM

## 2023-08-11 DIAGNOSIS — N20.0 BILATERAL NEPHROLITHIASIS: ICD-10-CM

## 2023-08-11 DIAGNOSIS — G89.29 CHRONIC MIDLINE LOW BACK PAIN WITHOUT SCIATICA: ICD-10-CM

## 2023-08-11 DIAGNOSIS — M54.50 CHRONIC MIDLINE LOW BACK PAIN WITHOUT SCIATICA: ICD-10-CM

## 2023-08-11 DIAGNOSIS — N20.0 BILATERAL NEPHROLITHIASIS: Primary | ICD-10-CM

## 2023-08-11 LAB
BILIRUB BLD-MCNC: NEGATIVE MG/DL
CLARITY, POC: CLEAR
COLOR UR: YELLOW
EXPIRATION DATE: NORMAL
GLUCOSE UR STRIP-MCNC: NEGATIVE MG/DL
KETONES UR QL: NEGATIVE
LEUKOCYTE EST, POC: NEGATIVE
Lab: NORMAL
NITRITE UR-MCNC: NEGATIVE MG/ML
PH UR: 6.5 [PH] (ref 5–8)
PROT UR STRIP-MCNC: NEGATIVE MG/DL
RBC # UR STRIP: NEGATIVE /UL
SP GR UR: 1.01 (ref 1–1.03)
UROBILINOGEN UR QL: NORMAL

## 2023-08-11 PROCEDURE — 74018 RADEX ABDOMEN 1 VIEW: CPT

## 2023-08-11 RX ORDER — IBUPROFEN 800 MG/1
800 TABLET ORAL
Qty: 30 TABLET | Refills: 0 | Status: SHIPPED | OUTPATIENT
Start: 2023-08-11

## 2023-08-11 RX ORDER — CYCLOBENZAPRINE HCL 5 MG
5 TABLET ORAL EVERY 8 HOURS PRN
Qty: 20 TABLET | Refills: 0 | Status: SHIPPED | OUTPATIENT
Start: 2023-08-11

## 2023-08-11 NOTE — PROGRESS NOTES
Chief Complaint  Nephrolithiasis (Surgery fu POST RIGHT ESWL/LEFT NEPHROCALCINOSIS)    Subjective          Yahir Garsia presents to [facility] for RENAL STONES POST RT ESWL  History of Present Illness    Mrs. Yahir Garsia is a pleasant 43-year-old female who returns to clinic today for evaluation. Patient is seen for follow-up post right extracorporeal shockwave lithotripsy procedure-ESWL for very painful right renal stones completed by Dr. Alvarez on 07/28/2023.     On initial evaluation in clinic today, she is in no apparent discomfort. Patient reports doing relatively well post procedure and denies any postop complications such as fevers, nausea, vomiting, severe pain in the flank, hematoma, she denies any spontaneous renal hemorrhage, or the recent distal fragments.     We did have a repeat KUB today which I have reviewed with two views of her abdomen showing tiny calcifications seen in the pelvis. There are calcifications in projecting over the expected location of the left kidney. Patient has moderate stool burden consistent with severe constipation.     Urine dipstick is completely negative for any leukocyte esterase. It is negative for nitrite. It is negative for gross/microscopic hematuria. A PVR 0 mL. Prior to procedure, patient had a CT abdomen and pelvis done which I reviewed showing bilateral intrarenal non obstructing stones, the largest of which was in the right lower pole measuring only 3 mm. There was no hydronephrosis or hydroureter noted on CT.    OVERALL, The patient states she is doing well. She thinks she passed some stones. Her left side hurts. She has 5 slipped discs. She has not seen Dr. Ohara in quite some time. She is having frequent bowel movements. She is taking MiraLAX. The only time she got constipated was right after surgery. She did not have a bowel movement for 3 days. After that, she had diarrhea. She does not have any muscle  "relaxers. She has used Flexeril and tramadol in the past. The Flexeril \"knocks her out.\" She is unsure if she has tried Robaxin in the past. She is taking Myrbetriq. She denies any frequency or urgency.    Active Ambulatory Problems     Diagnosis Date Noted    Leiomyoma 10/04/2016    Encounter for consultation 04/25/2017    Tobacco use 08/31/2017    Hoarseness 08/31/2017    Preoperative clearance 08/31/2017    Obesity, Class III, BMI 40-49.9 (morbid obesity) 08/31/2017    Essential hypertension 08/31/2017    Mixed hyperlipidemia 08/31/2017    Abnormal EKG 08/31/2017    SOB (shortness of breath) 08/31/2017    Gastroesophageal reflux disease with esophagitis 01/25/2018    Hand pain, right 09/04/2018    Kidney stone 10/10/2018    Appendicitis 12/14/2018    Sciatica associated with disorder of lumbosacral spine 12/30/2019    Bilateral flank pain 06/16/2023     Resolved Ambulatory Problems     Diagnosis Date Noted    Gastroesophageal reflux disease 01/29/2018     Past Medical History:   Diagnosis Date    Anxiety     Chronic laryngitis     Depression     Endometriosis     Fatigue     GERD (gastroesophageal reflux disease)     Hyperlipidemia     Kidney stones     Migraine     Plantar fasciitis     Prediabetes     Sleep apnea     Spinal headache       Objective   Vital Signs:   /86   Pulse 82   Ht 154.9 cm (61\")   Wt 107 kg (235 lb 6.4 oz)   BMI 44.48 kg/mý       ROS:   Review of Systems   Constitutional:  Positive for fatigue. Negative for activity change, appetite change, diaphoresis, fever, unexpected weight gain and unexpected weight loss.   HENT:  Negative for congestion, ear discharge, ear pain, nosebleeds, rhinorrhea, sinus pressure and sore throat.    Eyes:  Negative for blurred vision, double vision, photophobia, pain, redness and visual disturbance.   Respiratory:  Negative for apnea, cough, chest tightness, shortness of breath, wheezing and stridor.    Cardiovascular:  Negative for chest pain and " palpitations.   Gastrointestinal:  Negative for abdominal distention, abdominal pain, constipation, diarrhea, nausea and vomiting.   Endocrine: Negative for polydipsia, polyphagia and polyuria.   Genitourinary:  Positive for frequency. Negative for decreased urine volume, difficulty urinating, dysuria, flank pain, hematuria, urgency and urinary incontinence.   Musculoskeletal:  Positive for back pain. Negative for arthralgias and joint swelling.   Skin:  Negative for pallor, rash and wound.   Neurological:  Negative for dizziness, tremors, syncope, weakness, light-headedness, memory problem and confusion.   Hematological:  Bruises/bleeds easily.   Psychiatric/Behavioral:  Negative for behavioral problems.       Physical Exam  Constitutional:       General: She is in acute distress.      Appearance: She is well-developed. She is obese.   HENT:      Head: Normocephalic and atraumatic.   Eyes:      Pupils: Pupils are equal, round, and reactive to light.   Neck:      Thyroid: No thyromegaly.      Trachea: No tracheal deviation.   Cardiovascular:      Rate and Rhythm: Normal rate and regular rhythm.      Heart sounds: No murmur heard.  Pulmonary:      Effort: Pulmonary effort is normal. No respiratory distress.      Breath sounds: Normal breath sounds. No stridor. No wheezing.   Abdominal:      General: Bowel sounds are normal. There is distension.      Palpations: Abdomen is soft.      Tenderness: There is abdominal tenderness.   Genitourinary:     Labia:         Right: No tenderness.         Left: No tenderness.       Vagina: Normal. No vaginal discharge.   Musculoskeletal:         General: Tenderness present. No deformity. Normal range of motion.      Cervical back: Normal range of motion.   Skin:     General: Skin is warm and dry.      Capillary Refill: Capillary refill takes less than 2 seconds.      Coloration: Skin is pale.      Findings: No erythema or rash.   Neurological:      Mental Status: She is alert and  oriented to person, place, and time.      Cranial Nerves: No cranial nerve deficit.      Sensory: No sensory deficit.      Motor: Weakness present.      Coordination: Coordination normal.   Psychiatric:         Behavior: Behavior normal.         Thought Content: Thought content normal.         Judgment: Judgment normal.      Result Review :     UA          6/7/2023    17:12 6/16/2023    08:49 8/11/2023    09:53   Urinalysis   Squamous Epithelial Cells, UA 0-2      Specific Gravity, UA 1.009      Ketones, UA Negative  Negative  Negative    Blood, UA Large (3+)      Leukocytes, UA Negative  Negative  Negative    Nitrite, UA Negative      RBC, UA Too Numerous to Count      WBC, UA 0-2      Bacteria, UA None Seen                 Assessment and Plan    Problem List Items Addressed This Visit          Gastrointestinal Abdominal     Bilateral flank pain    Overview     Added automatically from request for surgery [id number]         Relevant Medications    cyclobenzaprine (FLEXERIL) 5 MG tablet    ibuprofen (ADVIL,MOTRIN) 800 MG tablet    Other Relevant Orders    XR abdomen kub     Other Visit Diagnoses       Bilateral nephrolithiasis    -  Primary    Relevant Medications    cyclobenzaprine (FLEXERIL) 5 MG tablet    ibuprofen (ADVIL,MOTRIN) 800 MG tablet    Other Relevant Orders    POC Urinalysis Dipstick, Automated (Completed)    XR abdomen kub    Chronic midline low back pain without sciatica        Relevant Medications    cyclobenzaprine (FLEXERIL) 5 MG tablet    ibuprofen (ADVIL,MOTRIN) 800 MG tablet    Other Relevant Orders    XR abdomen kub    Left renal stone        Relevant Orders    XR abdomen kub                                                           ASSESSMENT     BILATERAL Nephrolithiasis/ R>L FLANK PAIN /IBS -Constipation/OAB-DI  MS. PATRICIA PONCE IS A 43-year-old very pleasant patient, who presents to clinic today for evaluation for prior bilateral renal stones right side greater than left side, lower back  pain, overactive bladder/detrusor instability with urine frequency and urgency symptoms.  Also IBS with severe constipation.  She is in no apparent discomfort today, urine dipsticks complaint negative for leukocyte esterase, negative nitrites, negative gross/microscopic hematuria.  PVR 0 mL.    Right Renal Calculus / Post ESWL: Patient is seen for follow up today. She reports doing relatively well post procedure ESWL on 07/28/23 for a 3 mm right renal calculus. She denies having any post opts complications such as fevers, chills, bleeding, hematoma,  and has had no discomfort what soever.We both reviewed her repeat KUB today which shows The 3 MM calculus in the right kidney has completely passed. There are however faint or questionable non obstructing stones in the left kidney.  FINDINGS ON KUB: 2 views of the abdomen SHOW Tiny calcifications are seen in the pelvis. Vague calcification seen projecting over the expected location of the left kidney  Patient also has moderate stool burdeN consistent with severe constipation.     FIRST, We discussed the presence of the current tiny bilateral nonobstructing stones. We have discussed the various parameters regarding spontaneous passage including the notion that a tiny 1-4 mm stone has a 95% high likelihood of spontaneous passage versus a larger stone  being caught up in the upper areas of the urinary tract. We also discussed the medical management of stone disease and the use of medical expulsive therapy in the form of Flomax. This is used in an off label setting. I also talked about nonoperative management including ambulation and increasing fluids and hot tub as being an effective adjuncts in the treatment of a stone.We discussed the absolute relative indicators for intervention including the presence of sepsis, and pain we cannot control as the primary need for urgent intervention.  We discussed placement of a stent if indicated and the management of the stent as well.       GERD/IBS- Patient has significant irritable bowel syndrome, characterized by extreme amounts of constipation.  We spoke about the impact of this on bladder function.  We spoke about  its relationship to recurrent urinary tract infections. We discussed the need to increase p.o. fluid intake to at least 2 to 3 L of water daily, and discussed the physiology of colonic motility as well as use of MiraLAX as a bulk laxative versus the newer class of serotonin uptake blockers such as Linzess.  We stressed the need for a daily bowel movement and discussed the [place] stool scale at length. We also discussed a follow-up with her  GI nurse practitioner, she reports she had colonoscopy postponed.     Again, we discussed OAB/Detrusor Instability which is an  irritative bladder symptomatology most likely related to factors such as intake of bladder irritants, postinfectious irritation, prolapse, with a very large differential diagnosis.  The mainstay of treatment has been tight cholinergics which basically caused the bladder to have decreased contractility.  We have discussed the side effects of these treatments including dry mouth, double vision, and increasing constipation.  She reports doing well  on oxybutynin for symptomatic relief.     Anticholinergic medication:  I talked about the various therapeutic options including anticholinergics, beta 3 agonists, and alpha blockade if there is a component of obstruction. I discussed the side effects of anti-cholinergic including dry mouth, double vision. HOWEVER, we are recommending the use of an anticholinergic. AGAIN, We discussed the class of drugs.  We discussed the older drug such as oxybutynin with his increased spectrum of side effects such as dry mouth, dry eyes constipation versus the newer medications with lower side effects but more difficult to obtain via insurance and much more expensive finally the newest class of drugs which is Myrbetriq which has a very  favorable side effect profile but unfortunately is prohibitively expensive and oftentimes requires precertification of which may be unsuccessful in many other cases      ESWL-FINALLY, the patient is a candidate for extracorporeal shockwave lithotripsy.  We discussed the type of stone.  And the complications associated with the procedure including but not limited to pain in the flank, hematoma, spontaneous renal hemorrhage, and adequate fragmentation of stones.  The need for passage of the stones.  The need for concomitant additional procedures in the range of 24% the risk of a distal fragment in the range of 3% requiring ureteroscopy.                                                               PLAN  We discussed follow-up in 6 months to reevaluate for left nephrocalcinosis.    Also encouraged follow-up with PCP for her back pain secondary to slipped disc     We discussed OTHER  treatment options for her flank pain with patient encouraged to continue conservative therapy alternating NSAID/Tylenol as tolerated, Also including hot baths, showers, warm compresses to lower back as tolerated. Also encouraged walking, physical therapy, light exercises as tolerated     Rest is the most important treatment in the case of flank pain. Rest and physical therapy are enough to improve minor pain. Discussed to monitor her daily routine with prevention of flank pain by: At least Drinking 8 glasses of water per day, Limiting your alcohol consumption.  Having a healthy diet containing fruits, vegetables, and a lot of fluids, Practicing safe sex.  Also maintaining proper hygiene of your body as well as the environment.     Discussed a kidney stone prevention diet to include increasing p.o. fluid intake, to at least 1 to 2 L of water daily.  She is to avoid caffeine products such as cola, coffee, and to avoid soft or soda drinks.  She is to decrease her sodium consumption as in  Fast foods, mcgee, salted nuts, canned foods, and  smoked/cured foods.      She is also to decrease her oxalate consumption, as in spinach, Jorge Alberto greens, and Rhubarb.  Also important is to decrease protein intake, as in red meats, peanut butter, and also avoid nuts.     Continue Flomax daily as prescribed, Continue all other medications such as/Motrin/Flexeril on as-needed basis.     Strict bowel regiment due to her issues with diarrhea/severe constipation     Follow-up in clinic as discussed-EVAL MEDS FOR OAB/ POST PROCEDURE,      May return sooner if need be,     OR GO TO ER IF WORSENING SYMPTOMS     Patient agreeable plan of care    Patient reports that she is not currently experiencing any symptoms of urinary incontinence.    RADIOLOGY (CT AND/OR KUB):    CT Abdomen and Pelvis: No results found for this or any previous visit.     CT Stone Protocol: Results for orders placed during the hospital encounter of 06/07/23    CT Abdomen Pelvis Stone Protocol    Narrative  Procedure: Routine axial CT images of abdomen and pelvis acquired  without IV contrast. Additional 3 mm thick coronal and sagittal  reformats acquired. CT scan performed according to ALARA(as low as  reasonably achievable)dose protocol.    Comparison: None available    Findings:    Bilateral intrarenal non-obstructing stones. Largest in the right lower  pole measuring 3 mm.  No hydronephrosis is seen.  There are no focal inflammatory changes identified.  No free air or fluid seen.  No dilated small or large bowel.  No grossly thickened bowel loops  Appendix is not seen.  Visualized lung bases are negative.    Impression  No acute process in the abdomen/pelvis.    This report was finalized on 6/7/2023 5:21 PM by Sonya Andre MD.     KUB: Results for orders placed in visit on 12/26/19    XR Abdomen KUB    Narrative  XR ABDOMEN KUB-    HISTORY:  Kidney stones; N20.0-Calculus of kidney    Today's KUB is compared with a previous study from 2018. The bowel gas  pattern in the abdomen showed nothing to  suggest obstruction. There were  no soft tissue masses or pathological calcifications.    Impression  Nonspecific supine view of the abdomen.    This report was finalized on 12/26/2019 1:59 PM by Dr. Rob Brush II, MD.       LABS (3 MONTHS):    Office Visit on 08/11/2023   Component Date Value Ref Range Status    Color 08/11/2023 Yellow  Yellow, Straw, Dark Yellow, Marianela Final    Clarity, UA 08/11/2023 Clear  Clear Final    Specific Gravity  08/11/2023 1.010  1.005 - 1.030 Final    pH, Urine 08/11/2023 6.5  5.0 - 8.0 Final    Leukocytes 08/11/2023 Negative  Negative Final    Nitrite, UA 08/11/2023 Negative  Negative Final    Protein, POC 08/11/2023 Negative  Negative mg/dL Final    Glucose, UA 08/11/2023 Negative  Negative mg/dL Final    Ketones, UA 08/11/2023 Negative  Negative Final    Urobilinogen, UA 08/11/2023 Normal  Normal, 0.2 E.U./dL Final    Bilirubin 08/11/2023 Negative  Negative Final    Blood, UA 08/11/2023 Negative  Negative Final    Lot Number 08/11/2023 n   Final    Expiration Date 08/11/2023 n   Final   Pre-Admission Testing on 07/26/2023   Component Date Value Ref Range Status    Glucose 07/26/2023 96  65 - 99 mg/dL Final    BUN 07/26/2023 18  6 - 20 mg/dL Final    Creatinine 07/26/2023 1.11 (H)  0.57 - 1.00 mg/dL Final    Sodium 07/26/2023 139  136 - 145 mmol/L Final    Potassium 07/26/2023 4.5  3.5 - 5.2 mmol/L Final    Slight hemolysis detected by analyzer. Results may be affected.    Chloride 07/26/2023 107  98 - 107 mmol/L Final    CO2 07/26/2023 23.1  22.0 - 29.0 mmol/L Final    Calcium 07/26/2023 9.5  8.6 - 10.5 mg/dL Final    BUN/Creatinine Ratio 07/26/2023 16.2  7.0 - 25.0 Final    Anion Gap 07/26/2023 8.9  5.0 - 15.0 mmol/L Final    eGFR 07/26/2023 63.8  >60.0 mL/min/1.73 Final    WBC 07/26/2023 9.52  3.40 - 10.80 10*3/mm3 Final    RBC 07/26/2023 4.36  3.77 - 5.28 10*6/mm3 Final    Hemoglobin 07/26/2023 13.4  12.0 - 15.9 g/dL Final    Hematocrit 07/26/2023 41.4  34.0 - 46.6 % Final     MCV 07/26/2023 95.0  79.0 - 97.0 fL Final    MCH 07/26/2023 30.7  26.6 - 33.0 pg Final    MCHC 07/26/2023 32.4  31.5 - 35.7 g/dL Final    RDW 07/26/2023 13.2  12.3 - 15.4 % Final    RDW-SD 07/26/2023 46.6  37.0 - 54.0 fl Final    MPV 07/26/2023 9.7  6.0 - 12.0 fL Final    Platelets 07/26/2023 310  140 - 450 10*3/mm3 Final   Office Visit on 06/16/2023   Component Date Value Ref Range Status    Color 06/16/2023 Yellow  Yellow, Straw, Dark Yellow, Marianela Final    Clarity, UA 06/16/2023 Clear  Clear Final    Specific Gravity  06/16/2023 1.010  1.005 - 1.030 Final    pH, Urine 06/16/2023 6.5  5.0 - 8.0 Final    Leukocytes 06/16/2023 Negative  Negative Final    Nitrite, UA 06/16/2023 Negative  Negative Final    Protein, POC 06/16/2023 Negative  Negative mg/dL Final    Glucose, UA 06/16/2023 Negative  Negative mg/dL Final    Ketones, UA 06/16/2023 Negative  Negative Final    Urobilinogen, UA 06/16/2023 Normal  Normal, 0.2 E.U./dL Final    Bilirubin 06/16/2023 Negative  Negative Final    Blood, UA 06/16/2023 Negative  Negative Final    Lot Number 06/16/2023 [id number]   Final    Expiration Date 06/16/2023 10/25/2024   Final   Admission on 06/07/2023, Discharged on 06/07/2023   Component Date Value Ref Range Status    Glucose 06/07/2023 108 (H)  65 - 99 mg/dL Final    BUN 06/07/2023 12  6 - 20 mg/dL Final    Creatinine 06/07/2023 0.99  0.57 - 1.00 mg/dL Final    Sodium 06/07/2023 143  136 - 145 mmol/L Final    Potassium 06/07/2023 4.3  3.5 - 5.2 mmol/L Final    Slight hemolysis detected by analyzer. Results may be affected.    Chloride 06/07/2023 107  98 - 107 mmol/L Final    CO2 06/07/2023 24.3  22.0 - 29.0 mmol/L Final    Calcium 06/07/2023 9.0  8.6 - 10.5 mg/dL Final    Total Protein 06/07/2023 7.2  6.0 - 8.5 g/dL Final    Albumin 06/07/2023 4.2  3.5 - 5.2 g/dL Final    ALT (SGPT) 06/07/2023 17  1 - 33 U/L Final    AST (SGOT) 06/07/2023 20  1 - 32 U/L Final    Alkaline Phosphatase 06/07/2023 118 (H)  39 - 117 U/L  Final    Total Bilirubin 06/07/2023 0.4  0.0 - 1.2 mg/dL Final    Globulin 06/07/2023 3.0  gm/dL Final    A/G Ratio 06/07/2023 1.4  g/dL Final    BUN/Creatinine Ratio 06/07/2023 12.1  7.0 - 25.0 Final    Anion Gap 06/07/2023 11.7  5.0 - 15.0 mmol/L Final    eGFR 06/07/2023 73.2  >60.0 mL/min/1.73 Final    C-Reactive Protein 06/07/2023 1.71 (H)  0.00 - 0.50 mg/dL Final    Color, UA 06/07/2023 Yellow  Yellow, Straw Final    Appearance, UA 06/07/2023 Clear  Clear Final    pH, UA 06/07/2023 6.5  5.0 - 8.0 Final    Specific Gravity, UA 06/07/2023 1.009  1.005 - 1.030 Final    Glucose, UA 06/07/2023 Negative  Negative Final    Ketones, UA 06/07/2023 Negative  Negative Final    Bilirubin, UA 06/07/2023 Negative  Negative Final    Blood, UA 06/07/2023 Large (3+) (A)  Negative Final    Protein, UA 06/07/2023 Negative  Negative Final    Leuk Esterase, UA 06/07/2023 Negative  Negative Final    Nitrite, UA 06/07/2023 Negative  Negative Final    Urobilinogen, UA 06/07/2023 0.2 E.U./dL  0.2 - 1.0 E.U./dL Final    Extra Tube 06/07/2023 Hold for add-ons.   Final    Auto resulted.    Extra Tube 06/07/2023 hold for add-on   Final    Auto resulted    Extra Tube 06/07/2023 Hold for add-ons.   Final    Auto resulted.    Extra Tube 06/07/2023 Hold for add-ons.   Final    Auto resulted    WBC 06/07/2023 8.87  3.40 - 10.80 10*3/mm3 Final    RBC 06/07/2023 4.56  3.77 - 5.28 10*6/mm3 Final    Hemoglobin 06/07/2023 13.9  12.0 - 15.9 g/dL Final    Hematocrit 06/07/2023 43.8  34.0 - 46.6 % Final    MCV 06/07/2023 96.1  79.0 - 97.0 fL Final    MCH 06/07/2023 30.5  26.6 - 33.0 pg Final    MCHC 06/07/2023 31.7  31.5 - 35.7 g/dL Final    RDW 06/07/2023 13.4  12.3 - 15.4 % Final    RDW-SD 06/07/2023 48.0  37.0 - 54.0 fl Final    MPV 06/07/2023 9.5  6.0 - 12.0 fL Final    Platelets 06/07/2023 293  140 - 450 10*3/mm3 Final    Neutrophil % 06/07/2023 65.2  42.7 - 76.0 % Final    Lymphocyte % 06/07/2023 24.6  19.6 - 45.3 % Final    Monocyte % 06/07/2023  6.7  5.0 - 12.0 % Final    Eosinophil % 06/07/2023 2.1  0.3 - 6.2 % Final    Basophil % 06/07/2023 0.9  0.0 - 1.5 % Final    Immature Grans % 06/07/2023 0.5  0.0 - 0.5 % Final    Neutrophils, Absolute 06/07/2023 5.79  1.70 - 7.00 10*3/mm3 Final    Lymphocytes, Absolute 06/07/2023 2.18  0.70 - 3.10 10*3/mm3 Final    Monocytes, Absolute 06/07/2023 0.59  0.10 - 0.90 10*3/mm3 Final    Eosinophils, Absolute 06/07/2023 0.19  0.00 - 0.40 10*3/mm3 Final    Basophils, Absolute 06/07/2023 0.08  0.00 - 0.20 10*3/mm3 Final    Immature Grans, Absolute 06/07/2023 0.04  0.00 - 0.05 10*3/mm3 Final    nRBC 06/07/2023 0.0  0.0 - 0.2 /100 WBC Final    RBC, UA 06/07/2023 Too Numerous to Count (A)  None Seen, 0-2 /HPF Final    WBC, UA 06/07/2023 0-2  None Seen, 0-2 /HPF Final    Urine culture not indicated.    Bacteria, UA 06/07/2023 None Seen  None Seen /HPF Final    Squamous Epithelial Cells, UA 06/07/2023 0-2  None Seen, 0-2 /HPF Final    Hyaline Casts, UA 06/07/2023 None Seen  None Seen /LPF Final    Methodology 06/07/2023 Automated Microscopy   Final        Smoking Cessation Counseling:  Current some day smoker. less than 3 minutes spent counseling. Will try to cut down.  Patient refuses to stop tobacco use and refuses counseling.    1. Right renal stones  - The patient will have a repeat KUB today.  - A prescription for Flexeril 5 mg was sent to the patient's pharmacy.  - Alternate Motrin or Tylenol as needed.    2. Constipation  - The patient will continue to take MiraLAX as needed.    The patient will follow up in 6 months.    Follow Up   Return in about 6 months (around 2/12/2024) for Next scheduled follow up FOR LEFT RENAL STONES/FLANK/BACK PAIN.    Patient was given instructions and counseling regarding her condition or for health maintenance advice. Please see specific information pulled into the AVS if appropriate.          This document has been electronically signed by Griselda Cheng-Akwa, APRN   August 15, 2023 22:26  EDT      Dictated Utilizing Dragon Dictation: Part of this note may be an electronic transcription/translation of spoken language to printed text using the Dragon Dictation System.  Transcribed from ambient dictation for Griselda Cheng-Akwa, APRN by Lily Babcock.  08/11/23   11:32 EDT    Patient or patient representative verbalized consent to the visit recording.  I have personally performed the services described in this document as transcribed by the above individual, and it is both accurate and complete.

## 2024-05-20 ENCOUNTER — OFFICE VISIT (OUTPATIENT)
Dept: NEUROSURGERY | Facility: CLINIC | Age: 44
End: 2024-05-20
Payer: COMMERCIAL

## 2024-05-20 VITALS
BODY MASS INDEX: 44.56 KG/M2 | HEIGHT: 61 IN | WEIGHT: 236 LBS | SYSTOLIC BLOOD PRESSURE: 170 MMHG | DIASTOLIC BLOOD PRESSURE: 120 MMHG | TEMPERATURE: 97.1 F

## 2024-05-20 DIAGNOSIS — G89.29 CHRONIC RIGHT-SIDED LOW BACK PAIN WITHOUT SCIATICA: Primary | ICD-10-CM

## 2024-05-20 DIAGNOSIS — M54.50 CHRONIC RIGHT-SIDED LOW BACK PAIN WITHOUT SCIATICA: Primary | ICD-10-CM

## 2024-05-20 PROCEDURE — 3080F DIAST BP >= 90 MM HG: CPT | Performed by: PHYSICIAN ASSISTANT

## 2024-05-20 PROCEDURE — 3077F SYST BP >= 140 MM HG: CPT | Performed by: PHYSICIAN ASSISTANT

## 2024-05-20 PROCEDURE — 99204 OFFICE O/P NEW MOD 45 MIN: CPT | Performed by: PHYSICIAN ASSISTANT

## 2024-05-20 RX ORDER — ACETAMINOPHEN 325 MG/1
325 TABLET ORAL
COMMUNITY

## 2024-05-20 NOTE — PROGRESS NOTES
Patient: Yahir Garsia  : 1980    Primary Care Provider: Kathy Rhodes APRN      Chief Complaint: Low back right hip pain    History of Present Illness:       Patient is a very nice 43-year-old smoker who is referred to us secondary to a spondylolisthesis on MRI.    Patient presents and complains of low back pain and some intermittent pain that radiates into the lateral aspect of her right upper thigh.  Patient has talked to pain management and is actually going to get injections tomorrow with Dr. Rico.    Patient states that she does not have any predictable radicular symptoms into her leg but will intermittently have the lateral upper thigh pain.    Patient denies any signs or symptoms of progressive cervical myelopathy or any neurogenic claudication symptoms    Review of Systems   Constitutional:  Negative for activity change, appetite change, chills, diaphoresis, fatigue, fever and unexpected weight change.   HENT:  Negative for congestion, dental problem, drooling, ear discharge, ear pain, facial swelling, hearing loss, mouth sores, nosebleeds, postnasal drip, rhinorrhea, sinus pressure, sinus pain, sneezing, sore throat, tinnitus, trouble swallowing and voice change.    Eyes:  Negative for photophobia, pain, discharge, redness, itching and visual disturbance.   Respiratory:  Negative for apnea, cough, choking, chest tightness, shortness of breath, wheezing and stridor.    Cardiovascular:  Negative for chest pain, palpitations and leg swelling.   Gastrointestinal:  Negative for abdominal distention, abdominal pain, anal bleeding, blood in stool, constipation, diarrhea, nausea, rectal pain and vomiting.   Endocrine: Negative for cold intolerance, heat intolerance, polydipsia, polyphagia and polyuria.   Genitourinary:  Negative for decreased urine volume, difficulty urinating, dyspareunia, dysuria, enuresis, flank pain, frequency, genital sores, hematuria, menstrual problem, pelvic pain,  urgency, vaginal bleeding, vaginal discharge and vaginal pain.   Musculoskeletal:  Positive for back pain. Negative for arthralgias, gait problem, joint swelling, myalgias, neck pain and neck stiffness.   Skin:  Negative for color change, pallor, rash and wound.   Allergic/Immunologic: Negative for environmental allergies, food allergies and immunocompromised state.   Neurological:  Negative for dizziness, tremors, seizures, syncope, facial asymmetry, speech difficulty, weakness, light-headedness, numbness and headaches.   Hematological:  Negative for adenopathy. Does not bruise/bleed easily.   Psychiatric/Behavioral:  Negative for agitation, behavioral problems, confusion, decreased concentration, dysphoric mood, hallucinations, self-injury, sleep disturbance and suicidal ideas. The patient is nervous/anxious. The patient is not hyperactive.        Past Medical History:     Past Medical History:   Diagnosis Date    Anxiety     Chronic laryngitis     HISTORY OF    Claustrophobia     Depression     Endometriosis     Fatigue     GERD (gastroesophageal reflux disease)     full history in [facility].  SEVERE, on H2 blocker only due ot insurance not covering PPI.  Loses employment opportunities due to hoarseness from reflux.     Hoarseness     Hyperlipidemia     Hypertension     Kidney stones     Low back pain     Lumbosacral disc disease     Migraine     Plantar fasciitis     Prediabetes     HISTORY OF    Sleep apnea     does not use CPAP due to intolerance    Spinal headache        Family History:     Family History   Problem Relation Age of Onset    No Known Problems Mother     No Known Problems Father     Obesity Sister     Hypertension Sister        Social History:    reports that she has been smoking cigarettes. She has a 10 pack-year smoking history. She has been exposed to tobacco smoke. She has never used smokeless tobacco. She reports current alcohol use. She reports that she does not use drugs.   SMOKING STATUS: I  spent greater than 10 minutes educating the patient on smoking cessation, and discussed how smoking pertains to the particular disease process at hand.  The patient acknowledges understanding.      Surgical History:     Past Surgical History:   Procedure Laterality Date    ABDOMINAL SURGERY      APPENDECTOMY N/A 2018    Procedure: APPENDECTOMY LAPAROSCOPIC;  Surgeon: Augustina Ayala MD;  Location: [facility] OR;  Service: General    NAILS PROCEDURE N/A 2017    Procedure: ESOPHAGOGASTRODUODENOSCOPY AND NAILS;  Surgeon: Morris Solares MD;  Location: [facility] OR;  Service:     BRAVO PROCEDURE N/A 06/10/2019    Procedure: ESOPHAGOGASTRODUODENOSCOPY AND BRAVO; GE Junction at 36cm;  Surgeon: Morris Solares MD;  Location: [facility] OR;  Service: Gastroenterology    CARDIOVASCULAR STRESS TEST  2017     SECTION PRIMARY      done under general anesthesia b/c couldn't get epidural     SECTION WITH TUBAL      COLONOSCOPY      CYSTOSCOPY N/A 2018    Procedure: CYSTOSCOPY;  Surgeon: Ousmane Alvarez MD;  Location: [facility] OR;  Service: Urology    DIAGNOSTIC LAPAROSCOPY      looking for endometriosis    DILATATION AND CURETTAGE N/A 10/04/2016    Procedure: DILATATION AND CURETTAGE WITH FROZEN SECTION;  Surgeon: Rajendra Rico MD;  Location: [facility] OR;  for abnormal uterine bleeding    ECHO - CONVERTED  2017    ENDOMETRIAL FULGURATION      ENDOSCOPY  2017    in Dr. Beck Gongora    EXTRACORPOREAL SHOCK WAVE LITHOTRIPSY (ESWL) Left 2018    Procedure: EXTRACORPOREAL SHOCKWAVE LITHOTRIPSY;  Surgeon: Ousmane Alvarez MD;  Location: [facility] OR;  Service: Urology    EXTRACORPOREAL SHOCK WAVE LITHOTRIPSY (ESWL) Right 2023    Procedure: EXTRACORPOREAL SHOCKWAVE LITHOTRIPSY RIGHT;  Surgeon: Ousmane Alvarez MD;  Location: [facility] OR;  Service: Urology;  Laterality: Right;    FOOT SURGERY Right     plantar fasciitis    HIATAL HERNIA REPAIR N/A 2018     "Procedure: HIATAL HERNIA REPAIR LAPAROSCOPIC;  Surgeon: Morris Solares MD;  Location: [facility] OR;  Service:     KIDNEY STONE SURGERY      NISSEN FUNDOPLICATION N/A 01/29/2018    Procedure: NISSEN FUNDOPLICATION LAPAROSCOPIC ;  Surgeon: Morris Solares MD;  Location: [facility] OR;  Service:     TONSILLECTOMY      TOTAL LAPAROSCOPIC HYSTERECTOMY N/A 10/04/2016    Procedure: TOTAL LAPAROSCOPIC HYSTERECTOMY BILATERAL SALPINGO OOPHERECTOMY WITH DAVINCI SI ROBOT;  Surgeon: Rajendra Rico MD;  Location: [facility] OR;  endometriosis/fibroids    TUBAL ABDOMINAL LIGATION  2006    VOCAL CORD MASS EXCISION      CYST REMOVED       Allergies:   Patient has no known allergies.    Physical Exam:    Vital Signs:BP (!) 170/120 (BP Location: Left arm, Patient Position: Sitting, Cuff Size: Adult)   Temp 97.1 °F (36.2 °C) (Infrared)   Ht 154.9 cm (61\")   Wt 107 kg (236 lb)   LMP 09/30/2016 (Exact Date)   BMI 44.59 kg/m²    BMI: Body mass index is 44.59 kg/m².     GENERAL:           The patient is in no acute distress, and is able to answer all questions appropriately.    Neck:          Supple without lymphadenopathy    Cardiovascular:       Peripheral pulses 2+ at dorsalis pedis and posterior tibialis    Lungs:         Breathing unlabored    Musculoskeletal:            strength is 5 out of 5 bilaterally.        Shoulder abduction is 5 out of 5.         Dorsiflexion is 5/5 Bilaterally       Plantarflexion is 5/5 bilaterally       Hip Flexion 5/5 bilaterally.    Mild pain in the right hip on internal rotation    Point tenderness over the greater trochanter of the right hip    Neurologic:          The patient is alert and oriented by 3.          Pupils are equal and reactive to light.         Visual fields are full.         Extraocular movements are intact without nystagmus.         There is no evidence of central motor drift. No facial droop.  No difficulty with rapid alternating movements.         Sensation is equal bilaterally " with no deficit.           Reflexes:  2+ through out    Medical Decision Making    Data Review:   MRI of the lumbar spine is a very low quality secondary to open MRI.    That being said central canal is widely patent and no obvious disc herniation noted.  Perhaps mild malalignment of the L5-S1 bone with mild facet hypertrophy    CT of the abdomen pelvis also reviewed not showing any sign of obvious pars fracture that was mentioned in the MRI    Diagnosis:   Chronic low back pain  Right greater trochanteric bursitis    Treatment Options:   I think the patient is suffering from chronic low back pain as well as greater trochanteric bursitis.  Patient has no easily correctable spinal issues but I think would benefit from pain management.    We also spent about 10 minutes discussing the need for smoking cessation as well as weight loss.    She will see us back on an as-needed basis.       Diagnosis Plan   1. Chronic right-sided low back pain without sciatica

## 2024-07-08 NOTE — ED PROVIDER NOTES
"Enter Query Response Below      Query Response: TYPE II MI DUE TO DEMAND ISCHEMIA FROM CHRONIC CAD             If applicable, please update the problem list.     Patient: Ninoska Gloria        : 1931  Account: [id number]           Admit Date:         How to Respond to this query:       a. Click New Note     b. Answer query within the yellow box.                c. Update the Problem List, if applicable.      If you have any questions about this query contact me at: [email or web address]     ,     Risk Factors: 89-year-old female with a history of \"CAD, HTN, HLD, A-fib, CHF, chronic O2 2 LNC, memory loss/mild dementia, DM type II, CKD 3, frequent UTIs, and ACUNA cirrhosis\" per H&P.   Clinical Indicators: Presented on  with chest pain. Cardiology note () reads, \"Given advanced age and stable clinical course and lack of significant elevation of serial troponins would pursue conservative course and suspect type II non-STEMI.\" The discharge summary lists, \"chest pain, NSTEMI\" under presenting problem and lists, \"non-ischemic nontraumatic myocardial injury, NSTEMI\" under active hospital problems. \"EKG shows a chronic inferior infarct but no new ST changes\" per cardiology consult (). HS troponin 67 ( @ 1323), 63 ( @ 1533).   Treatment: Nitroglycerin drip, Lovenox, aspirin, statin      Please clarify the type of NSTEMI the patient was treated/monitored for:      Type 2 MI due to demand ischemia from chronic CAD  Type 2 MI due to: __________________  Other- specify______  Unable to determine    By submitting this query, we are merely seeking further clarification of documentation to accurately reflect all conditions that you are monitoring, evaluating, treating or that extend the hospitalization or utilize additional resources of care. Please utilize your independent clinical judgment when addressing the question(s) above.     This query and your response, once completed, will be entered into " Subjective     Urinary Tract Infection   Pain quality:  Burning  Pain severity:  Moderate  Onset quality:  Gradual  Duration:  1 day  Timing:  Constant  Progression:  Worsening  Chronicity:  New  Recent urinary tract infections: no    Relieved by:  None tried  Worsened by:  Nothing  Ineffective treatments:  None tried  Urinary symptoms: discolored urine, frequent urination and hematuria    Urinary symptoms: no foul-smelling urine, no hesitancy and no bladder incontinence    Associated symptoms: no abdominal pain, no fever, no flank pain, no genital lesions, no nausea, no vaginal discharge and no vomiting    Risk factors: hx of urolithiasis    Risk factors: no hx of pyelonephritis, no kidney transplant, not pregnant, no recurrent urinary tract infections, no renal cysts, not sexually active, no sexually transmitted infections, no single kidney and no urinary catheter        Review of Systems   Constitutional: Negative.  Negative for fever.   HENT: Negative.    Respiratory: Negative.    Cardiovascular: Negative.  Negative for chest pain.   Gastrointestinal: Negative.  Negative for abdominal pain, diarrhea, nausea, rectal pain and vomiting.   Endocrine: Negative.    Genitourinary: Positive for dysuria, frequency and hematuria. Negative for decreased urine volume, difficulty urinating, dyspareunia, enuresis, flank pain, genital sores, menstrual problem, pelvic pain, urgency, vaginal bleeding, vaginal discharge and vaginal pain.   Skin: Negative.    Neurological: Negative.    Psychiatric/Behavioral: Negative.    All other systems reviewed and are negative.      Past Medical History:   Diagnosis Date   • Anxiety    • Chronic laryngitis    • Fatigue    • GERD (gastroesophageal reflux disease)     full history in [facility].  SEVERE, on H2 blocker only due ot insurance not covering PPI.  Loses employment opportunities due to hoarseness from reflux.    • Hyperlipidemia    • Hypertension    • Kidney stones    • Migraine    • PONV  (postoperative nausea and vomiting)    • Prediabetes    • Sleep apnea     does not use CPAP due to intolerance   • Spinal headache        No Known Allergies    Past Surgical History:   Procedure Laterality Date   • ABDOMINAL SURGERY     • BRAVO PROCEDURE N/A 2017    Procedure: ESOPHAGOGASTRODUODENOSCOPY AND NAILS;  Surgeon: Morris Solares MD;  Location:  [facility] OR;  Service:    • CARDIOVASCULAR STRESS TEST  2017   •  SECTION PRIMARY      done under general anesthesia b/c couldn't get epidural   •  SECTION WITH TUBAL     • DIAGNOSTIC LAPAROSCOPY      looking for endometriosis   • DILATATION AND CURETTAGE N/A 10/4/2016    Procedure: DILATATION AND CURETTAGE WITH FROZEN SECTION;  Surgeon: Rajendra Rico MD;  Location: [facility] OR;  for abnormal uterine bleeding   • ECHO - CONVERTED  2017   • ENDOMETRIAL FULGURATION     • ENDOSCOPY      in Dr. Beck Gongora   • HIATAL HERNIA REPAIR N/A 2018    Procedure: HIATAL HERNIA REPAIR LAPAROSCOPIC;  Surgeon: Morris Solares MD;  Location: [facility] OR;  Service:    • NISSEN FUNDOPLICATION N/A 2018    Procedure: NISSEN FUNDOPLICATION LAPAROSCOPIC ;  Surgeon: Morris Solares MD;  Location: [facility] OR;  Service:    • TOTAL LAPAROSCOPIC HYSTERECTOMY N/A 10/4/2016    Procedure: TOTAL LAPAROSCOPIC HYSTERECTOMY BILATERAL SALPINGO OOPHERECTOMY WITH DAVINCI SI ROBOT;  Surgeon: Rajendra Rico MD;  Location: [facility] OR;  endometriosis/fibroids   • TUBAL ABDOMINAL LIGATION         Family History   Problem Relation Age of Onset   • No Known Problems Mother    • No Known Problems Father    • Obesity Sister    • Hypertension Sister        Social History     Social History   • Marital status:      Spouse name: Heath Garsia   • Number of children: 2   • Years of education: high School [employer]      Occupational History   • Unemployed       Social History Main Topics   • Smoking status: Current Some Day Smoker     Packs/day: 0.50     Years:  the legal medical record.    Sincerely,  Dia Jordan  Clinical Documentation Integrity Program      10.00     Types: Cigarettes   • Smokeless tobacco: Never Used      Comment: I not around secondhand smoke in house or car unless visits certain family members   • Alcohol use Yes      Comment: occasional   • Drug use: No   • Sexual activity: Defer      Comment:       Other Topics Concern   • Not on file     Social History Narrative    Lives with  and children.  Unemployed due to not being able to find work due to hoarse voice.  Denied disability due to hoarseness not that severe.             Objective   Physical Exam   Constitutional: She is oriented to person, place, and time. She appears well-developed and well-nourished. No distress.   HENT:   Head: Normocephalic and atraumatic.   Right Ear: External ear normal.   Left Ear: External ear normal.   Nose: Nose normal.   Eyes: Pupils are equal, round, and reactive to light. Conjunctivae and EOM are normal.   Neck: Normal range of motion. Neck supple. No JVD present. No tracheal deviation present.   Cardiovascular: Normal rate, regular rhythm and normal heart sounds.    No murmur heard.  Pulmonary/Chest: Effort normal and breath sounds normal. No respiratory distress. She has no wheezes.   Abdominal: Soft. Bowel sounds are normal. She exhibits no distension and no mass. There is no tenderness. There is no rebound and no guarding. No hernia.   Musculoskeletal: Normal range of motion. She exhibits no edema or deformity.   Neurological: She is alert and oriented to person, place, and time. No cranial nerve deficit.   Skin: Skin is warm and dry. No rash noted. She is not diaphoretic. No erythema. No pallor.   Psychiatric: She has a normal mood and affect. Her behavior is normal. Thought content normal.   Nursing note and vitals reviewed.      Procedures           ED Course  ED Course as of Oct 01 0100   Sun Sep 30, 2018   4852 Equivocal cystitis; nonobstructive nephrolithiasis per [facility] report.  CT Abdomen Pelvis Stone Protocol [MM]   Mon Oct 01, 2018   0058  Patient diagnosed with bilateral kidney stones and UTI. Will be d/c home with rx for augmentin and f/u with PCP in 3 days or return to ER if symptoms worsen.   [MM]      ED Course User Index  [MM] Paula Agarwal PA                  MDM  Number of Diagnoses or Management Options  Acute cystitis with hematuria:   Bilateral kidney stones:      Amount and/or Complexity of Data Reviewed  Clinical lab tests: ordered and reviewed  Tests in the radiology section of CPT®: ordered and reviewed  Discuss the patient with other providers: yes          Final diagnoses:   Bilateral kidney stones   Acute cystitis with hematuria            Paula Agarwal PA  10/01/18 0100

## (undated) DEVICE — ENDOPOUCH RETRIEVER SPECIMEN RETRIEVAL BAGS: Brand: ENDOPOUCH RETRIEVER

## (undated) DEVICE — ENDOPATH ELECTROSURGERY PROBE PLUS II 5MM RIGHT ANGLE MONOPOLAR ELECTROSURGERY SUCTION AND IRRRIGATION SHAFTS WITH RIGHT ANGLE ELECTRODE - USE ONLY WITH PROBE PLUS II HANDLES: Brand: ENDOPATH

## (undated) DEVICE — ENDOPATH XCEL BLUNT TIP TROCARS WITH SMOOTH SLEEVES: Brand: ENDOPATH XCEL

## (undated) DEVICE — FRCP BX RADJAW4 NDL 2.8 240CM LG OG BX40

## (undated) DEVICE — POLYESTER SINGLE STITCH RELOAD: Brand: SURGIDAC

## (undated) DEVICE — 40595 XL TRENDELENBURG POSITIONING KIT: Brand: 40595 XL TRENDELENBURG POSITIONING KIT

## (undated) DEVICE — Device

## (undated) DEVICE — SYS CLOSE PORTII CARTR/THOMASN XL

## (undated) DEVICE — Device: Brand: DEFENDO AIR/WATER/SUCTION AND BIOPSY VALVE

## (undated) DEVICE — ENCORE® LATEX MICRO SIZE 7, STERILE LATEX POWDER-FREE SURGICAL GLOVE: Brand: ENCORE

## (undated) DEVICE — GOWN,REINF,POLY,ECL,PP SLV,XXL: Brand: MEDLINE

## (undated) DEVICE — UNDYED BRAIDED (POLYGLACTIN 910), SYNTHETIC ABSORBABLE SUTURE: Brand: COATED VICRYL

## (undated) DEVICE — SUTURING DEVICE: Brand: ENDO STITCH

## (undated) DEVICE — VICRYL VLT 0 45CM ENDOLOOP: Brand: ENDOLOOP

## (undated) DEVICE — ANTIBACTERIAL UNDYED BRAIDED (POLYGLACTIN 910), SYNTHETIC ABSORBABLE SUTURE: Brand: COATED VICRYL

## (undated) DEVICE — GOWN,REINF,POLY,ECL,PP SLV,XL: Brand: MEDLINE

## (undated) DEVICE — TROCAR: Brand: KII FIOS FIRST ENTRY

## (undated) DEVICE — SINGLE PORT MANIFOLD: Brand: NEPTUNE 2

## (undated) DEVICE — CAPS TRANSMTR ENDOSC PH MONTR BRAVO

## (undated) DEVICE — TP DETACH LIGHTED INNERVISION 56F

## (undated) DEVICE — ENDOPATH XCEL BLADELESS TROCARS WITH STABILITY SLEEVES: Brand: ENDOPATH XCEL

## (undated) DEVICE — LAPAROSCOPIC SCOPE WARMER: Brand: DEROYAL

## (undated) DEVICE — TUBING, SUCTION, 1/4" X 20', STRAIGHT: Brand: MEDLINE INDUSTRIES, INC.

## (undated) DEVICE — ECHELON FLEX45 ENDOPATH STAPLER, ARTICULATING ENDOSCOPIC LINEAR CUTTER (NO CARTRIDGE): Brand: ECHELON ENDOPATH

## (undated) DEVICE — PDS II VLT 0 107CM AG ST3: Brand: ENDOLOOP

## (undated) DEVICE — PK LAP GEN 70

## (undated) DEVICE — ENDOCUT SCISSOR TIP, DISPOSABLE: Brand: RENEW

## (undated) DEVICE — SYR LUERLOK 30CC

## (undated) DEVICE — [HIGH FLOW INSUFFLATOR,  DO NOT USE IF PACKAGE IS DAMAGED,  KEEP DRY,  KEEP AWAY FROM SUNLIGHT,  PROTECT FROM HEAT AND RADIOACTIVE SOURCES.]: Brand: PNEUMOSURE

## (undated) DEVICE — COR CYSTO: Brand: MEDLINE INDUSTRIES, INC.

## (undated) DEVICE — DRAPE, LAVH, STERILE: Brand: MEDLINE

## (undated) DEVICE — THE BITE BLOCK MAXI, LATEX FREE STRAP IS USED TO PROTECT THE ENDOSCOPE INSERTION TUBE FROM BEING BITTEN BY THE PATIENT.

## (undated) DEVICE — HOLDER: Brand: DEROYAL

## (undated) DEVICE — TROCAR: Brand: KII SLEEVE

## (undated) DEVICE — INSUFFLATION NEEDLE TO ESTABLISH PNEUMOPERITONEUM.: Brand: INSUFFLATION NEEDLE

## (undated) DEVICE — HARMONIC ACE +7 LAPAROSCOPIC SHEARS ADVANCED HEMOSTASIS 5MM DIAMETER 36CM SHAFT LENGTH  FOR USE WITH GRAY HAND PIECE ONLY: Brand: HARMONIC ACE

## (undated) DEVICE — JACKSON-PRATT 100CC BULB RESERVOIR: Brand: CARDINAL HEALTH

## (undated) DEVICE — ENCORE® LATEX MICRO SIZE 7.5, STERILE LATEX POWDER-FREE SURGICAL GLOVE: Brand: ENCORE

## (undated) DEVICE — PENCL E/S HNDSWCH PUSHBTN HOLSTR 10FT

## (undated) DEVICE — ELECTRD BLD EDGE/INSUL1P SFTY SLV 2.75IN

## (undated) DEVICE — ENDOPATH ELECTROSURGERY PROBE PLUS II PISTOL HAND CONTROL PISTOL GRIP HANDLES WITH SUCTION AND IRRRIGATION FOR HAND CONTROL MONOPOLAR ELCTROSURGERY USE ONLY WITH PROBE PLUS II SHAFTS: Brand: ENDOPATH

## (undated) DEVICE — SKIN AFFIX SURG ADHESIVE 72/CS 0.55ML: Brand: MEDLINE

## (undated) DEVICE — SUT MNCRYL 4/0 PS2 18 IN

## (undated) DEVICE — GLV SURG SENSICARE MICRO PF LF 8 STRL

## (undated) DEVICE — SUT MNCRYL PLS ANTIB UD 4/0 PS2 18IN

## (undated) DEVICE — DRN PENRS XRAY DECT 1/2X12IN STRL LTX

## (undated) DEVICE — TROCAR: Brand: KII® SLEEVE

## (undated) DEVICE — CYSTO/BLADDER IRRIGATION SET, REGULATING CLAMP

## (undated) DEVICE — TUBING, SUCTION, 3/16" X 10', STRAIGHT: Brand: MEDLINE

## (undated) DEVICE — 2, DISPOSABLE SUCTION/IRRIGATOR WITH DISPOSABLE TIP: Brand: STRYKEFLOW

## (undated) DEVICE — SUT VIC 0/0 UR6 27IN DYED J603H

## (undated) DEVICE — BNDG ADHS CURAD FLX/FABRC 2X4IN STRL LF